# Patient Record
Sex: FEMALE | Race: WHITE | NOT HISPANIC OR LATINO | Employment: OTHER | URBAN - METROPOLITAN AREA
[De-identification: names, ages, dates, MRNs, and addresses within clinical notes are randomized per-mention and may not be internally consistent; named-entity substitution may affect disease eponyms.]

---

## 2021-04-09 DIAGNOSIS — Z01.812 BLOOD TESTS PRIOR TO TREATMENT OR PROCEDURE: Primary | ICD-10-CM

## 2021-06-25 DIAGNOSIS — Z01.812 BLOOD TESTS PRIOR TO TREATMENT OR PROCEDURE: Primary | ICD-10-CM

## 2021-08-12 ENCOUNTER — OFFICE VISIT (OUTPATIENT)
Dept: PULMONOLOGY | Facility: CLINIC | Age: 56
End: 2021-08-12

## 2021-08-12 VITALS
BODY MASS INDEX: 45.24 KG/M2 | WEIGHT: 265 LBS | OXYGEN SATURATION: 91 % | SYSTOLIC BLOOD PRESSURE: 178 MMHG | DIASTOLIC BLOOD PRESSURE: 90 MMHG | HEIGHT: 64 IN | TEMPERATURE: 98.7 F | HEART RATE: 112 BPM

## 2021-08-12 DIAGNOSIS — R06.02 SHORTNESS OF BREATH: Primary | ICD-10-CM

## 2021-08-12 DIAGNOSIS — J41.1 MUCOPURULENT CHRONIC BRONCHITIS (HCC): ICD-10-CM

## 2021-08-12 DIAGNOSIS — I10 ESSENTIAL HYPERTENSION: ICD-10-CM

## 2021-08-12 PROBLEM — E11.9 TYPE 2 DIABETES MELLITUS (HCC): Status: ACTIVE | Noted: 2021-08-12

## 2021-08-12 PROCEDURE — 94375 RESPIRATORY FLOW VOLUME LOOP: CPT | Performed by: INTERNAL MEDICINE

## 2021-08-12 PROCEDURE — 94726 PLETHYSMOGRAPHY LUNG VOLUMES: CPT | Performed by: INTERNAL MEDICINE

## 2021-08-12 PROCEDURE — 99204 OFFICE O/P NEW MOD 45 MIN: CPT | Performed by: INTERNAL MEDICINE

## 2021-08-12 PROCEDURE — 94729 DIFFUSING CAPACITY: CPT | Performed by: INTERNAL MEDICINE

## 2021-08-12 RX ORDER — ALBUTEROL SULFATE 2.5 MG/3ML
2.5 SOLUTION RESPIRATORY (INHALATION) EVERY 4 HOURS PRN
COMMUNITY
Start: 2021-06-10

## 2021-08-12 RX ORDER — LISINOPRIL 20 MG/1
20 TABLET ORAL DAILY
COMMUNITY
Start: 2021-05-24 | End: 2022-10-14 | Stop reason: HOSPADM

## 2021-08-12 RX ORDER — ALBUTEROL SULFATE 90 UG/1
2 AEROSOL, METERED RESPIRATORY (INHALATION) EVERY 4 HOURS PRN
Status: ON HOLD | COMMUNITY
End: 2022-10-11

## 2021-08-12 RX ORDER — POTASSIUM CHLORIDE 750 MG/1
10 CAPSULE, EXTENDED RELEASE ORAL DAILY
Status: ON HOLD | COMMUNITY
Start: 2021-05-11 | End: 2022-10-14 | Stop reason: SDUPTHER

## 2021-08-12 RX ORDER — FUROSEMIDE 20 MG/1
20 TABLET ORAL DAILY
Status: ON HOLD | COMMUNITY
Start: 2021-06-10 | End: 2021-11-23 | Stop reason: SDUPTHER

## 2021-08-12 NOTE — PROGRESS NOTES
New Patient Pulmonary Office Visit      Patient Name: Jovanna Ching    Referring Physician: No ref. provider found    Chief Complaint:    Chief Complaint   Patient presents with   • COPD       History of Present Illness: Jovanna Ching is a 55 y.o. female who is here today to establish care with Pulmonary. Patient has a past medical history sniffing hypertension, diabetes mellitus, depression, tobacco abuse and allergic rhinitis.  She was referred to pulmonary for evaluation of shortness of breath.  She is currently on Anoro prescribed by her PCP.  Patient states that her breathing is actually doing quite a bit better since which she was hospitalized a few months ago.  At that point time she was found to have significant mount of volume overload and started on Lasix.  The significantly improved.  She has continue to follow with cardiology.  She states that she plans to see cardiology in the next couple of weeks for ongoing testing.  She continues on the Anoro.  She has had no exacerbations of her COPD in the last year.  She denies any fever, chills, nausea, or vomiting.  She had some mild lotion edema and blood pressure has been elevated.    Review of Systems:   Review of Systems   Constitutional: Negative for activity change, appetite change, chills and diaphoresis.   HENT: Negative for congestion, postnasal drip, sinus pressure and voice change.    Eyes: Negative for blurred vision.   Respiratory: Positive for shortness of breath. Negative for cough and wheezing.    Cardiovascular: Positive for leg swelling. Negative for chest pain.   Gastrointestinal: Negative for abdominal pain.   Musculoskeletal: Negative for myalgias.   Skin: Negative for color change and dry skin.   Allergic/Immunologic: Negative for environmental allergies.   Neurological: Negative for weakness and confusion.   Hematological: Negative for adenopathy.   Psychiatric/Behavioral: Negative for sleep disturbance and depressed mood.       Past Medical  "History:   Past Medical History:   Diagnosis Date   • Broken leg    • Diabetes mellitus (CMS/HCC)    • Hypertension    • Swelling        Past Surgical History:   Past Surgical History:   Procedure Laterality Date   •  SECTION         Family History: History reviewed. No pertinent family history.    Social History:   Social History     Socioeconomic History   • Marital status:      Spouse name: Not on file   • Number of children: Not on file   • Years of education: Not on file   • Highest education level: Not on file   Tobacco Use   • Smoking status: Current Every Day Smoker     Packs/day: 1.00     Years: 32.00     Pack years: 32.00   • Smokeless tobacco: Never Used   • Tobacco comment: 1 pack per week   Vaping Use   • Vaping Use: Never used   Substance and Sexual Activity   • Alcohol use: Never   • Drug use: Never   • Sexual activity: Defer       Medications:     Current Outpatient Medications:   •  albuterol (PROVENTIL) (2.5 MG/3ML) 0.083% nebulizer solution, , Disp: , Rfl:   •  albuterol sulfate  (90 Base) MCG/ACT inhaler, Inhale 2 puffs Every 4 (Four) Hours As Needed for Wheezing., Disp: , Rfl:   •  furosemide (LASIX) 20 MG tablet, , Disp: , Rfl:   •  lisinopril (PRINIVIL,ZESTRIL) 20 MG tablet, TAKE 1 TABLET BY MOUTH 1 TIME A DAY, Disp: , Rfl:   •  metFORMIN (GLUCOPHAGE) 500 MG tablet, , Disp: , Rfl:   •  potassium chloride (MICRO-K) 10 MEQ CR capsule, , Disp: , Rfl:   •  umeclidinium-vilanterol (Anoro Ellipta) 62.5-25 MCG/INH aerosol powder  inhaler, Inhale 1 puff Daily., Disp: , Rfl:     Allergies:   No Known Allergies    Physical Exam:  Vital Signs:   Vitals:    21 1247   BP: 178/90   Pulse: 112   Temp: 98.7 °F (37.1 °C)   SpO2: 91%  Comment: resting at room air   Weight: 120 kg (265 lb)   Height: 162.6 cm (64\")       Physical Exam  Vitals and nursing note reviewed.   Constitutional:       General: She is not in acute distress.     Appearance: She is well-developed. She is obese. " She is not ill-appearing or toxic-appearing.   HENT:      Head: Normocephalic and atraumatic.   Cardiovascular:      Rate and Rhythm: Normal rate and regular rhythm.      Pulses: Normal pulses.      Heart sounds: Normal heart sounds. No murmur heard.   No friction rub. No gallop.    Pulmonary:      Effort: Pulmonary effort is normal. No respiratory distress.      Breath sounds: Normal breath sounds. No wheezing, rhonchi or rales.   Musculoskeletal:      Right lower leg: Edema present.      Left lower leg: Edema present.   Skin:     General: Skin is warm and dry.   Neurological:      Mental Status: She is alert and oriented to person, place, and time.         Immunization History   Administered Date(s) Administered   • Flu Vaccine Quad PF >36MO 11/16/2020       Results Review:   - I personally reviewed the pts imaging from chest x-ray from 8/12/2021 shows bilateral increased interstitial markings, consistent with volume overload.  - I personally reviewed the pts PFT from 8/12/2021 showed moderate obstruction with significant air trapping no restriction and a mildly reduced DLCO.  - I personally reviewed the pts chart with regards to evaluation by her PCP.    Assessment / Plan:   1. Shortness of breath (Primary)  2. Mucopurulent chronic bronchitis (CMS/HCC)  3. Essential hypertension  -The patient shortness of breath is likely a combination of the heart failure and underlying COPD.  She is not been formally told she has heart failure but given her elevated blood pressure, increased interstitial markings on chest x-ray and lower skin edema.  She very likely has either diastolic or systolic heart failure.  I have requested the records from her cardiologist in addition to [facility] to see if an echo was performed.  In addition to this I do think is reasonable to get her cardiology work-up as previously ordered to look for any signs of ischemic disease.  I explained to her as far as diastolic heart failure goes main  things would be to lower her sodium, lose weight, improve her blood pressure and take her Lasix.  I will defer to cardiology on titration of her Lasix and hypertensive medications.  With regards to her COPD I am sure this is playing a role as well I did recommend tobacco cessation, but currently she cannot afford the nicotine patches and her insurance would not pay for them, so she will continue to smoke.  I want her to continue on the Anoro 1 puff once daily I will go ahead and refill that medication and also refill the albuterol to be used on an as-needed basis.  -I will plan to see her back in a few weeks with a 6-minute walk test at the next visit looking for signs of hypoxemia, I would like to wait on this until she has been optimized from a cardiac standpoint.    Follow Up:   Return in about 3 months (around 11/12/2021).     JACKELINE Lopes, DO  Pulmonary and Critical Care Medicine  Note Electronically Signed    Please note that portions of this note may have been completed with a voice recognition program. Efforts were made to edit the dictations, but occasionally words are mistranscribed.

## 2021-11-18 ENCOUNTER — APPOINTMENT (OUTPATIENT)
Dept: CT IMAGING | Facility: HOSPITAL | Age: 56
End: 2021-11-18

## 2021-11-18 ENCOUNTER — APPOINTMENT (OUTPATIENT)
Dept: GENERAL RADIOLOGY | Facility: HOSPITAL | Age: 56
End: 2021-11-18

## 2021-11-18 ENCOUNTER — HOSPITAL ENCOUNTER (INPATIENT)
Facility: HOSPITAL | Age: 56
LOS: 5 days | Discharge: HOME | End: 2021-11-23
Attending: EMERGENCY MEDICINE | Admitting: INTERNAL MEDICINE

## 2021-11-18 DIAGNOSIS — J44.1 COPD EXACERBATION (HCC): ICD-10-CM

## 2021-11-18 DIAGNOSIS — Z74.09 IMPAIRED FUNCTIONAL MOBILITY, BALANCE, GAIT, AND ENDURANCE: ICD-10-CM

## 2021-11-18 DIAGNOSIS — J96.02 ACUTE RESPIRATORY FAILURE WITH HYPERCAPNIA (HCC): Primary | ICD-10-CM

## 2021-11-18 DIAGNOSIS — I50.9 ACUTE ON CHRONIC CONGESTIVE HEART FAILURE, UNSPECIFIED HEART FAILURE TYPE (HCC): ICD-10-CM

## 2021-11-18 PROBLEM — J81.0 ACUTE PULMONARY EDEMA: Status: ACTIVE | Noted: 2021-11-18

## 2021-11-18 PROBLEM — J96.01 ACUTE RESPIRATORY FAILURE WITH HYPOXIA AND HYPERCAPNIA (HCC): Status: ACTIVE | Noted: 2021-11-18

## 2021-11-18 PROBLEM — Z72.0 TOBACCO ABUSE: Status: ACTIVE | Noted: 2021-11-18

## 2021-11-18 PROBLEM — J96.11 CHRONIC RESPIRATORY FAILURE WITH HYPOXIA (HCC): Status: ACTIVE | Noted: 2021-11-18

## 2021-11-18 LAB
ALBUMIN SERPL-MCNC: 3.8 G/DL (ref 3.5–5.2)
ALBUMIN/GLOB SERPL: 1.1 G/DL
ALP SERPL-CCNC: 104 U/L (ref 39–117)
ALT SERPL W P-5'-P-CCNC: 35 U/L (ref 1–33)
ANION GAP SERPL CALCULATED.3IONS-SCNC: 7 MMOL/L (ref 5–15)
ARTERIAL PATENCY WRIST A: ABNORMAL
AST SERPL-CCNC: 27 U/L (ref 1–32)
ATMOSPHERIC PRESS: ABNORMAL MM[HG]
BASE EXCESS BLDA CALC-SCNC: 8.9 MMOL/L (ref 0–2)
BASOPHILS # BLD AUTO: 0.05 10*3/MM3 (ref 0–0.2)
BASOPHILS NFR BLD AUTO: 0.4 % (ref 0–1.5)
BDY SITE: ABNORMAL
BILIRUB SERPL-MCNC: 0.3 MG/DL (ref 0–1.2)
BODY TEMPERATURE: 37 C
BUN SERPL-MCNC: 18 MG/DL (ref 6–20)
BUN/CREAT SERPL: 31 (ref 7–25)
CALCIUM SPEC-SCNC: 8.9 MG/DL (ref 8.6–10.5)
CHLORIDE SERPL-SCNC: 100 MMOL/L (ref 98–107)
CO2 BLDA-SCNC: 41.4 MMOL/L (ref 22–33)
CO2 SERPL-SCNC: 31 MMOL/L (ref 22–29)
COHGB MFR BLD: 2.9 % (ref 0–2)
CREAT SERPL-MCNC: 0.58 MG/DL (ref 0.57–1)
DEPRECATED RDW RBC AUTO: 55.5 FL (ref 37–54)
EOSINOPHIL # BLD AUTO: 0.07 10*3/MM3 (ref 0–0.4)
EOSINOPHIL NFR BLD AUTO: 0.6 % (ref 0.3–6.2)
EPAP: 0
ERYTHROCYTE [DISTWIDTH] IN BLOOD BY AUTOMATED COUNT: 16.6 % (ref 12.3–15.4)
FLUAV SUBTYP SPEC NAA+PROBE: NOT DETECTED
FLUBV RNA ISLT QL NAA+PROBE: NOT DETECTED
GFR SERPL CREATININE-BSD FRML MDRD: 107 ML/MIN/1.73
GLOBULIN UR ELPH-MCNC: 3.5 GM/DL
GLUCOSE SERPL-MCNC: 160 MG/DL (ref 65–99)
HCO3 BLDA-SCNC: 38.9 MMOL/L (ref 20–26)
HCT VFR BLD AUTO: 44.3 % (ref 34–46.6)
HCT VFR BLD CALC: 41.6 % (ref 38–51)
HGB BLD-MCNC: 13 G/DL (ref 12–15.9)
HGB BLDA-MCNC: 13.6 G/DL (ref 14–18)
HOLD SPECIMEN: NORMAL
IMM GRANULOCYTES # BLD AUTO: 0.06 10*3/MM3 (ref 0–0.05)
IMM GRANULOCYTES NFR BLD AUTO: 0.5 % (ref 0–0.5)
INHALED O2 CONCENTRATION: 80 %
IPAP: 0
LYMPHOCYTES # BLD AUTO: 1.42 10*3/MM3 (ref 0.7–3.1)
LYMPHOCYTES NFR BLD AUTO: 12.1 % (ref 19.6–45.3)
Lab: ABNORMAL
MCH RBC QN AUTO: 27.4 PG (ref 26.6–33)
MCHC RBC AUTO-ENTMCNC: 29.3 G/DL (ref 31.5–35.7)
MCV RBC AUTO: 93.5 FL (ref 79–97)
METHGB BLD QL: 0.3 % (ref 0–1.5)
MODALITY: ABNORMAL
MONOCYTES # BLD AUTO: 0.72 10*3/MM3 (ref 0.1–0.9)
MONOCYTES NFR BLD AUTO: 6.2 % (ref 5–12)
NEUTROPHILS NFR BLD AUTO: 80.2 % (ref 42.7–76)
NEUTROPHILS NFR BLD AUTO: 9.37 10*3/MM3 (ref 1.7–7)
NOTE: ABNORMAL
NOTIFIED BY: ABNORMAL
NOTIFIED WHO: ABNORMAL
NRBC BLD AUTO-RTO: 0 /100 WBC (ref 0–0.2)
NT-PROBNP SERPL-MCNC: 610.6 PG/ML (ref 0–900)
OXYHGB MFR BLDV: 94.2 % (ref 94–99)
PAW @ PEAK INSP FLOW SETTING VENT: 0 CMH2O
PCO2 BLDA: 81.5 MM HG (ref 35–45)
PCO2 TEMP ADJ BLD: 81.5 MM HG (ref 35–45)
PH BLDA: 7.29 PH UNITS (ref 7.35–7.45)
PH, TEMP CORRECTED: 7.29 PH UNITS
PLATELET # BLD AUTO: 376 10*3/MM3 (ref 140–450)
PMV BLD AUTO: 9.9 FL (ref 6–12)
PO2 BLDA: 97.1 MM HG (ref 83–108)
PO2 TEMP ADJ BLD: 97.1 MM HG (ref 83–108)
POTASSIUM SERPL-SCNC: 4.8 MMOL/L (ref 3.5–5.2)
PROT SERPL-MCNC: 7.3 G/DL (ref 6–8.5)
RBC # BLD AUTO: 4.74 10*6/MM3 (ref 3.77–5.28)
SARS-COV-2 RNA PNL SPEC NAA+PROBE: NOT DETECTED
SODIUM SERPL-SCNC: 138 MMOL/L (ref 136–145)
TOTAL RATE: 0 BREATHS/MINUTE
TROPONIN T SERPL-MCNC: <0.01 NG/ML (ref 0–0.03)
WBC NRBC COR # BLD: 11.69 10*3/MM3 (ref 3.4–10.8)
WHOLE BLOOD HOLD SPECIMEN: NORMAL
WHOLE BLOOD HOLD SPECIMEN: NORMAL

## 2021-11-18 PROCEDURE — 94799 UNLISTED PULMONARY SVC/PX: CPT

## 2021-11-18 PROCEDURE — 83050 HGB METHEMOGLOBIN QUAN: CPT

## 2021-11-18 PROCEDURE — 25010000002 METHYLPREDNISOLONE PER 125 MG: Performed by: PHYSICIAN ASSISTANT

## 2021-11-18 PROCEDURE — 93005 ELECTROCARDIOGRAM TRACING: CPT

## 2021-11-18 PROCEDURE — 94640 AIRWAY INHALATION TREATMENT: CPT

## 2021-11-18 PROCEDURE — 87636 SARSCOV2 & INF A&B AMP PRB: CPT | Performed by: EMERGENCY MEDICINE

## 2021-11-18 PROCEDURE — 82375 ASSAY CARBOXYHB QUANT: CPT

## 2021-11-18 PROCEDURE — 99223 1ST HOSP IP/OBS HIGH 75: CPT | Performed by: INTERNAL MEDICINE

## 2021-11-18 PROCEDURE — 83880 ASSAY OF NATRIURETIC PEPTIDE: CPT | Performed by: EMERGENCY MEDICINE

## 2021-11-18 PROCEDURE — 25010000002 ENOXAPARIN PER 10 MG: Performed by: NURSE PRACTITIONER

## 2021-11-18 PROCEDURE — 71045 X-RAY EXAM CHEST 1 VIEW: CPT

## 2021-11-18 PROCEDURE — 36600 WITHDRAWAL OF ARTERIAL BLOOD: CPT

## 2021-11-18 PROCEDURE — 36415 COLL VENOUS BLD VENIPUNCTURE: CPT

## 2021-11-18 PROCEDURE — 71275 CT ANGIOGRAPHY CHEST: CPT

## 2021-11-18 PROCEDURE — 82805 BLOOD GASES W/O2 SATURATION: CPT

## 2021-11-18 PROCEDURE — 94660 CPAP INITIATION&MGMT: CPT

## 2021-11-18 PROCEDURE — 99284 EMERGENCY DEPT VISIT MOD MDM: CPT

## 2021-11-18 PROCEDURE — 84484 ASSAY OF TROPONIN QUANT: CPT | Performed by: EMERGENCY MEDICINE

## 2021-11-18 PROCEDURE — 93005 ELECTROCARDIOGRAM TRACING: CPT | Performed by: EMERGENCY MEDICINE

## 2021-11-18 PROCEDURE — 80053 COMPREHEN METABOLIC PANEL: CPT | Performed by: EMERGENCY MEDICINE

## 2021-11-18 PROCEDURE — 85025 COMPLETE CBC W/AUTO DIFF WBC: CPT | Performed by: EMERGENCY MEDICINE

## 2021-11-18 PROCEDURE — 25010000002 FUROSEMIDE PER 20 MG: Performed by: PHYSICIAN ASSISTANT

## 2021-11-18 PROCEDURE — 0 IOPAMIDOL PER 1 ML: Performed by: INTERNAL MEDICINE

## 2021-11-18 RX ORDER — IPRATROPIUM BROMIDE AND ALBUTEROL SULFATE 2.5; .5 MG/3ML; MG/3ML
3 SOLUTION RESPIRATORY (INHALATION) EVERY 4 HOURS PRN
Status: DISCONTINUED | OUTPATIENT
Start: 2021-11-18 | End: 2021-11-23 | Stop reason: HOSPADM

## 2021-11-18 RX ORDER — SODIUM CHLORIDE 0.9 % (FLUSH) 0.9 %
10 SYRINGE (ML) INJECTION AS NEEDED
Status: DISCONTINUED | OUTPATIENT
Start: 2021-11-18 | End: 2021-11-23 | Stop reason: HOSPADM

## 2021-11-18 RX ORDER — FUROSEMIDE 10 MG/ML
40 INJECTION INTRAMUSCULAR; INTRAVENOUS ONCE
Status: COMPLETED | OUTPATIENT
Start: 2021-11-19 | End: 2021-11-19

## 2021-11-18 RX ORDER — ALBUTEROL SULFATE 2.5 MG/3ML
2.5 SOLUTION RESPIRATORY (INHALATION) EVERY 4 HOURS PRN
Status: DISCONTINUED | OUTPATIENT
Start: 2021-11-18 | End: 2021-11-23 | Stop reason: HOSPADM

## 2021-11-18 RX ORDER — ACETAMINOPHEN 325 MG/1
650 TABLET ORAL EVERY 4 HOURS PRN
Status: DISCONTINUED | OUTPATIENT
Start: 2021-11-18 | End: 2021-11-23 | Stop reason: HOSPADM

## 2021-11-18 RX ORDER — FUROSEMIDE 10 MG/ML
40 INJECTION INTRAMUSCULAR; INTRAVENOUS ONCE
Status: COMPLETED | OUTPATIENT
Start: 2021-11-18 | End: 2021-11-18

## 2021-11-18 RX ORDER — METHYLPREDNISOLONE SODIUM SUCCINATE 125 MG/2ML
60 INJECTION, POWDER, LYOPHILIZED, FOR SOLUTION INTRAMUSCULAR; INTRAVENOUS EVERY 8 HOURS
Status: DISCONTINUED | OUTPATIENT
Start: 2021-11-19 | End: 2021-11-21

## 2021-11-18 RX ORDER — IPRATROPIUM BROMIDE AND ALBUTEROL SULFATE 2.5; .5 MG/3ML; MG/3ML
3 SOLUTION RESPIRATORY (INHALATION)
Status: DISCONTINUED | OUTPATIENT
Start: 2021-11-18 | End: 2021-11-23 | Stop reason: HOSPADM

## 2021-11-18 RX ORDER — LISINOPRIL 20 MG/1
20 TABLET ORAL
Status: DISCONTINUED | OUTPATIENT
Start: 2021-11-19 | End: 2021-11-23 | Stop reason: HOSPADM

## 2021-11-18 RX ORDER — ALBUTEROL SULFATE 90 UG/1
2 AEROSOL, METERED RESPIRATORY (INHALATION) ONCE
Status: COMPLETED | OUTPATIENT
Start: 2021-11-18 | End: 2021-11-18

## 2021-11-18 RX ORDER — SODIUM CHLORIDE 0.9 % (FLUSH) 0.9 %
10 SYRINGE (ML) INJECTION EVERY 12 HOURS SCHEDULED
Status: DISCONTINUED | OUTPATIENT
Start: 2021-11-18 | End: 2021-11-23 | Stop reason: HOSPADM

## 2021-11-18 RX ORDER — METHYLPREDNISOLONE SODIUM SUCCINATE 125 MG/2ML
125 INJECTION, POWDER, LYOPHILIZED, FOR SOLUTION INTRAMUSCULAR; INTRAVENOUS ONCE
Status: COMPLETED | OUTPATIENT
Start: 2021-11-18 | End: 2021-11-18

## 2021-11-18 RX ORDER — NICOTINE POLACRILEX 4 MG
15 LOZENGE BUCCAL
Status: DISCONTINUED | OUTPATIENT
Start: 2021-11-18 | End: 2021-11-23 | Stop reason: HOSPADM

## 2021-11-18 RX ORDER — ACETAMINOPHEN 160 MG/5ML
650 SOLUTION ORAL EVERY 4 HOURS PRN
Status: DISCONTINUED | OUTPATIENT
Start: 2021-11-18 | End: 2021-11-23 | Stop reason: HOSPADM

## 2021-11-18 RX ORDER — DEXTROSE MONOHYDRATE 25 G/50ML
25 INJECTION, SOLUTION INTRAVENOUS
Status: DISCONTINUED | OUTPATIENT
Start: 2021-11-18 | End: 2021-11-23 | Stop reason: HOSPADM

## 2021-11-18 RX ORDER — ACETAMINOPHEN 650 MG/1
650 SUPPOSITORY RECTAL EVERY 4 HOURS PRN
Status: DISCONTINUED | OUTPATIENT
Start: 2021-11-18 | End: 2021-11-23 | Stop reason: HOSPADM

## 2021-11-18 RX ADMIN — FUROSEMIDE 40 MG: 40 INJECTION, SOLUTION INTRAMUSCULAR; INTRAVENOUS at 19:47

## 2021-11-18 RX ADMIN — ACETAMINOPHEN 650 MG: 325 TABLET, FILM COATED ORAL at 23:29

## 2021-11-18 RX ADMIN — IOPAMIDOL 100 ML: 755 INJECTION, SOLUTION INTRAVENOUS at 22:41

## 2021-11-18 RX ADMIN — ALBUTEROL SULFATE 2 PUFF: 90 AEROSOL, METERED RESPIRATORY (INHALATION) at 18:33

## 2021-11-18 RX ADMIN — METHYLPREDNISOLONE SODIUM SUCCINATE 125 MG: 125 INJECTION, POWDER, FOR SOLUTION INTRAMUSCULAR; INTRAVENOUS at 18:54

## 2021-11-18 RX ADMIN — ENOXAPARIN SODIUM 40 MG: 40 INJECTION SUBCUTANEOUS at 23:29

## 2021-11-18 NOTE — ED PROVIDER NOTES
Subjective   Ms. Ching is a pleasant 56-year-old female with a history of COPD (on 2 L by nasal cannula) who presents to the emergency department with complaints of increasing shortness of breath and cough.  The patient was at her PCPs office (Rylee Love) today to discuss her recent heart catheterization results (normal).  While there, she was noted to be short of breath and hypoxic with O2 sats in the 50s.  Her portable oxygen tank was determined to be empty.  She was placed on supplemental O2 in the office but her O2 sats only improved into the upper 80s.  She was sent to our emergency department for further evaluation.  The patient denies any chest pain.  She states that she has had several weeks.  No fever.  The patient has had no known exposure to Covid 19.  She has not been vaccinated against COVID-19.  The patient continues to smoke 1/2 pack cigarettes daily.  She denies any other health problems.          Review of Systems   Constitutional: Negative for chills and fever.   HENT: Negative for sore throat.    Respiratory: Positive for cough and shortness of breath.    Cardiovascular: Negative for chest pain.   Gastrointestinal: Negative for abdominal pain, diarrhea, nausea and vomiting.   Genitourinary: Negative for decreased urine volume and dysuria.   Musculoskeletal: Negative for back pain.   Skin: Negative for pallor and rash.   Allergic/Immunologic: Negative for immunocompromised state.   Neurological: Negative for light-headedness and headaches.   Hematological: Negative.    Psychiatric/Behavioral: Negative.        Past Medical History:   Diagnosis Date   • Broken leg    • Diabetes mellitus (HCC)    • Hypertension    • Swelling        No Known Allergies    Past Surgical History:   Procedure Laterality Date   •  SECTION         History reviewed. No pertinent family history.    Social History     Socioeconomic History   • Marital status:    Tobacco Use   • Smoking status: Current Every  Day Smoker     Packs/day: 1.00     Years: 32.00     Pack years: 32.00   • Smokeless tobacco: Never Used   • Tobacco comment: 1 pack per week   Vaping Use   • Vaping Use: Never used   Substance and Sexual Activity   • Alcohol use: Never   • Drug use: Never   • Sexual activity: Defer           Objective   Physical Exam  Constitutional:       Appearance: She is well-developed. She is obese.      Comments: The patient appears dyspneic.  She is alert and mentating well.   HENT:      Head: Normocephalic.      Nose: Nose normal.      Mouth/Throat:      Mouth: Mucous membranes are moist.   Eyes:      Pupils: Pupils are equal, round, and reactive to light.   Cardiovascular:      Rate and Rhythm: Normal rate and regular rhythm.      Pulses: Normal pulses.   Pulmonary:      Effort: Respiratory distress present.      Comments: Diminished breath sounds bilaterally.  Mild expiratory wheezes.  Mild bilateral rhonchi.  Abdominal:      General: Bowel sounds are normal.      Palpations: Abdomen is soft.      Tenderness: There is no abdominal tenderness. There is no guarding.   Musculoskeletal:         General: No tenderness. Normal range of motion.      Cervical back: Normal range of motion.      Right lower leg: Edema present.      Left lower leg: Edema present.      Comments: 2+ bilateral lower extremity edema.   Neurological:      General: No focal deficit present.      Mental Status: She is alert and oriented to person, place, and time.   Psychiatric:         Mood and Affect: Mood normal.         Critical Care  Performed by: Michael Trevizo PA  Authorized by: Usman Birmingham MD     Critical care provider statement:     Critical care time (minutes):  60    Critical care time was exclusive of:  Separately billable procedures and treating other patients    Critical care was necessary to treat or prevent imminent or life-threatening deterioration of the following conditions:  Respiratory failure    Critical care was time  spent personally by me on the following activities:  Development of treatment plan with patient or surrogate, evaluation of patient's response to treatment, examination of patient, obtaining history from patient or surrogate, ordering and performing treatments and interventions, ordering and review of laboratory studies, ordering and review of radiographic studies, pulse oximetry, re-evaluation of patient's condition and review of old charts               ED Course      The pt was hypoxic at 77% on nasal cannula on arrival.  She was placed on a NRB mask and sats improved to 95%    CXR shows interval development of cardiomegaly and congestive heart  failure. Extensive bilateral pulmonary interstitial edema and small  pleural effusions.  Pt was given IV Lasix 40mg .  Her ABG show respiratory acidosis with hypercarbia with pCO2 of 81.5, pO2 of 97 and pH of 7.287.  She was placed on BiPAP.  Her COVID swab is negative.  Her BNP is 610.  Her labs are otherwise bland.  Pt was given solumedrol and albuterol via MDI.  Will admit.                                       MDM    Final diagnoses:   Acute respiratory failure with hypercapnia (HCC)   Acute on chronic congestive heart failure, unspecified heart failure type (HCC)   COPD exacerbation (HCC)       ED Disposition  ED Disposition     ED Disposition Condition Comment    Decision to Admit            No follow-up provider specified.       Medication List      No changes were made to your prescriptions during this visit.          Michael Trevizo PA  11/18/21 0380

## 2021-11-19 ENCOUNTER — APPOINTMENT (OUTPATIENT)
Dept: CARDIOLOGY | Facility: HOSPITAL | Age: 56
End: 2021-11-19

## 2021-11-19 LAB
ANION GAP SERPL CALCULATED.3IONS-SCNC: 7 MMOL/L (ref 5–15)
ARTERIAL PATENCY WRIST A: ABNORMAL
ASCENDING AORTA: 2.7 CM
ATMOSPHERIC PRESS: ABNORMAL MM[HG]
BASE EXCESS BLDA CALC-SCNC: 10.8 MMOL/L (ref 0–2)
BASE EXCESS BLDA CALC-SCNC: 12.3 MMOL/L (ref 0–2)
BASE EXCESS BLDA CALC-SCNC: 12.5 MMOL/L (ref 0–2)
BASE EXCESS BLDA CALC-SCNC: 12.9 MMOL/L (ref 0–2)
BASOPHILS # BLD AUTO: 0.02 10*3/MM3 (ref 0–0.2)
BASOPHILS NFR BLD AUTO: 0.2 % (ref 0–1.5)
BDY SITE: ABNORMAL
BH CV ECHO MEAS - AO MAX PG (FULL): 12 MMHG
BH CV ECHO MEAS - AO MAX PG: 22.3 MMHG
BH CV ECHO MEAS - AO MEAN PG (FULL): 5.1 MMHG
BH CV ECHO MEAS - AO MEAN PG: 10.3 MMHG
BH CV ECHO MEAS - AO ROOT AREA (BSA CORRECTED): 1.2
BH CV ECHO MEAS - AO ROOT AREA: 6.4 CM^2
BH CV ECHO MEAS - AO ROOT DIAM: 2.9 CM
BH CV ECHO MEAS - AO V2 MAX: 236.2 CM/SEC
BH CV ECHO MEAS - AO V2 MEAN: 146.9 CM/SEC
BH CV ECHO MEAS - AO V2 VTI: 40.9 CM
BH CV ECHO MEAS - ASC AORTA: 2.7 CM
BH CV ECHO MEAS - AVA(I,A): 2.7 CM^2
BH CV ECHO MEAS - AVA(I,D): 2.7 CM^2
BH CV ECHO MEAS - AVA(V,A): 2.4 CM^2
BH CV ECHO MEAS - AVA(V,D): 2.4 CM^2
BH CV ECHO MEAS - BSA(HAYCOCK): 2.6 M^2
BH CV ECHO MEAS - BSA: 2.4 M^2
BH CV ECHO MEAS - BZI_BMI: 44.1 KILOGRAMS/M^2
BH CV ECHO MEAS - BZI_METRIC_HEIGHT: 172.7 CM
BH CV ECHO MEAS - BZI_METRIC_WEIGHT: 131.5 KG
BH CV ECHO MEAS - EDV(CUBED): 81.9 ML
BH CV ECHO MEAS - EDV(MOD-SP2): 125 ML
BH CV ECHO MEAS - EDV(MOD-SP4): 129 ML
BH CV ECHO MEAS - EDV(TEICH): 85 ML
BH CV ECHO MEAS - EF(CUBED): 83.8 %
BH CV ECHO MEAS - EF(MOD-BP): 64 %
BH CV ECHO MEAS - EF(MOD-SP2): 60.8 %
BH CV ECHO MEAS - EF(MOD-SP4): 66.7 %
BH CV ECHO MEAS - EF(TEICH): 77.1 %
BH CV ECHO MEAS - ESV(CUBED): 13.3 ML
BH CV ECHO MEAS - ESV(MOD-SP2): 49 ML
BH CV ECHO MEAS - ESV(MOD-SP4): 43 ML
BH CV ECHO MEAS - ESV(TEICH): 19.5 ML
BH CV ECHO MEAS - FS: 45.5 %
BH CV ECHO MEAS - IVS/LVPW: 0.69
BH CV ECHO MEAS - IVSD: 0.68 CM
BH CV ECHO MEAS - LA DIMENSION: 4 CM
BH CV ECHO MEAS - LA/AO: 1.4
BH CV ECHO MEAS - LAD MAJOR: 5.7 CM
BH CV ECHO MEAS - LAT PEAK E' VEL: 9.8 CM/SEC
BH CV ECHO MEAS - LATERAL E/E' RATIO: 10.9
BH CV ECHO MEAS - LV DIASTOLIC VOL/BSA (35-75): 53.9 ML/M^2
BH CV ECHO MEAS - LV IVRT: 0.06 SEC
BH CV ECHO MEAS - LV MASS(C)D: 113 GRAMS
BH CV ECHO MEAS - LV MASS(C)DI: 47.2 GRAMS/M^2
BH CV ECHO MEAS - LV MAX PG: 10.3 MMHG
BH CV ECHO MEAS - LV MEAN PG: 5.3 MMHG
BH CV ECHO MEAS - LV SYSTOLIC VOL/BSA (12-30): 18 ML/M^2
BH CV ECHO MEAS - LV V1 MAX: 160.4 CM/SEC
BH CV ECHO MEAS - LV V1 MEAN: 107.2 CM/SEC
BH CV ECHO MEAS - LV V1 VTI: 31.7 CM
BH CV ECHO MEAS - LVIDD: 4.3 CM
BH CV ECHO MEAS - LVIDS: 2.4 CM
BH CV ECHO MEAS - LVLD AP2: 9.1 CM
BH CV ECHO MEAS - LVLD AP4: 9.3 CM
BH CV ECHO MEAS - LVLS AP2: 7.8 CM
BH CV ECHO MEAS - LVLS AP4: 7.7 CM
BH CV ECHO MEAS - LVOT AREA (M): 3.5 CM^2
BH CV ECHO MEAS - LVOT AREA: 3.5 CM^2
BH CV ECHO MEAS - LVOT DIAM: 2.1 CM
BH CV ECHO MEAS - LVPWD: 0.99 CM
BH CV ECHO MEAS - MED PEAK E' VEL: 8.7 CM/SEC
BH CV ECHO MEAS - MEDIAL E/E' RATIO: 12.3
BH CV ECHO MEAS - MV A MAX VEL: 102.7 CM/SEC
BH CV ECHO MEAS - MV DEC SLOPE: 501.6 CM/SEC^2
BH CV ECHO MEAS - MV DEC TIME: 0.18 SEC
BH CV ECHO MEAS - MV E MAX VEL: 108.6 CM/SEC
BH CV ECHO MEAS - MV E/A: 1.1
BH CV ECHO MEAS - MV MAX PG: 8.3 MMHG
BH CV ECHO MEAS - MV MEAN PG: 3.6 MMHG
BH CV ECHO MEAS - MV P1/2T MAX VEL: 149.3 CM/SEC
BH CV ECHO MEAS - MV P1/2T: 87.2 MSEC
BH CV ECHO MEAS - MV V2 MAX: 143.9 CM/SEC
BH CV ECHO MEAS - MV V2 MEAN: 88.2 CM/SEC
BH CV ECHO MEAS - MV V2 VTI: 38.8 CM
BH CV ECHO MEAS - MVA P1/2T LCG: 1.5 CM^2
BH CV ECHO MEAS - MVA(P1/2T): 2.5 CM^2
BH CV ECHO MEAS - MVA(VTI): 2.9 CM^2
BH CV ECHO MEAS - PA ACC SLOPE: 880 CM/SEC^2
BH CV ECHO MEAS - PA ACC TIME: 0.13 SEC
BH CV ECHO MEAS - PA PR(ACCEL): 22 MMHG
BH CV ECHO MEAS - RAP SYSTOLE: 3 MMHG
BH CV ECHO MEAS - RVSP: 42 MMHG
BH CV ECHO MEAS - SI(AO): 110.1 ML/M^2
BH CV ECHO MEAS - SI(CUBED): 28.7 ML/M^2
BH CV ECHO MEAS - SI(LVOT): 46.9 ML/M^2
BH CV ECHO MEAS - SI(MOD-SP2): 31.8 ML/M^2
BH CV ECHO MEAS - SI(MOD-SP4): 35.9 ML/M^2
BH CV ECHO MEAS - SI(TEICH): 27.4 ML/M^2
BH CV ECHO MEAS - SV(AO): 263.4 ML
BH CV ECHO MEAS - SV(CUBED): 68.6 ML
BH CV ECHO MEAS - SV(LVOT): 112.3 ML
BH CV ECHO MEAS - SV(MOD-SP2): 76 ML
BH CV ECHO MEAS - SV(MOD-SP4): 86 ML
BH CV ECHO MEAS - SV(TEICH): 65.5 ML
BH CV ECHO MEAS - TAPSE (>1.6): 2.9 CM
BH CV ECHO MEAS - TR MAX PG: 39 MMHG
BH CV ECHO MEAS - TR MAX VEL: 314 CM/SEC
BH CV ECHO MEASUREMENTS AVERAGE E/E' RATIO: 11.74
BH CV VAS BP LEFT ARM: NORMAL MMHG
BH CV XLRA - RV BASE: 4.3 CM
BH CV XLRA - RV LENGTH: 8.9 CM
BH CV XLRA - RV MID: 2.6 CM
BH CV XLRA - TDI S': 15.6 CM/SEC
BODY TEMPERATURE: 37 C
BUN SERPL-MCNC: 16 MG/DL (ref 6–20)
BUN/CREAT SERPL: 31.4 (ref 7–25)
CALCIUM SPEC-SCNC: 9 MG/DL (ref 8.6–10.5)
CHLORIDE SERPL-SCNC: 96 MMOL/L (ref 98–107)
CO2 BLDA-SCNC: 42.6 MMOL/L (ref 22–33)
CO2 BLDA-SCNC: 42.6 MMOL/L (ref 22–33)
CO2 BLDA-SCNC: 44.8 MMOL/L (ref 22–33)
CO2 BLDA-SCNC: 44.8 MMOL/L (ref 22–33)
CO2 SERPL-SCNC: 35 MMOL/L (ref 22–29)
COHGB MFR BLD: 1.3 % (ref 0–2)
COHGB MFR BLD: 1.3 % (ref 0–2)
COHGB MFR BLD: 1.4 % (ref 0–2)
COHGB MFR BLD: 1.6 % (ref 0–2)
CREAT SERPL-MCNC: 0.51 MG/DL (ref 0.57–1)
DEPRECATED RDW RBC AUTO: 54.9 FL (ref 37–54)
EOSINOPHIL # BLD AUTO: 0 10*3/MM3 (ref 0–0.4)
EOSINOPHIL NFR BLD AUTO: 0 % (ref 0.3–6.2)
EPAP: 0
ERYTHROCYTE [DISTWIDTH] IN BLOOD BY AUTOMATED COUNT: 16.1 % (ref 12.3–15.4)
GFR SERPL CREATININE-BSD FRML MDRD: 124 ML/MIN/1.73
GLUCOSE BLDC GLUCOMTR-MCNC: 148 MG/DL (ref 70–130)
GLUCOSE BLDC GLUCOMTR-MCNC: 201 MG/DL (ref 70–130)
GLUCOSE BLDC GLUCOMTR-MCNC: 206 MG/DL (ref 70–130)
GLUCOSE BLDC GLUCOMTR-MCNC: 294 MG/DL (ref 70–130)
GLUCOSE SERPL-MCNC: 168 MG/DL (ref 65–99)
HBA1C MFR BLD: 7.3 % (ref 4.8–5.6)
HCO3 BLDA-SCNC: 40.2 MMOL/L (ref 20–26)
HCO3 BLDA-SCNC: 40.6 MMOL/L (ref 20–26)
HCO3 BLDA-SCNC: 42.2 MMOL/L (ref 20–26)
HCO3 BLDA-SCNC: 42.3 MMOL/L (ref 20–26)
HCT VFR BLD AUTO: 44.8 % (ref 34–46.6)
HCT VFR BLD CALC: 40.3 % (ref 38–51)
HCT VFR BLD CALC: 40.9 % (ref 38–51)
HCT VFR BLD CALC: 41.4 % (ref 38–51)
HCT VFR BLD CALC: 41.6 % (ref 38–51)
HGB BLD-MCNC: 13 G/DL (ref 12–15.9)
HGB BLDA-MCNC: 13.1 G/DL (ref 14–18)
HGB BLDA-MCNC: 13.3 G/DL (ref 14–18)
HGB BLDA-MCNC: 13.5 G/DL (ref 14–18)
HGB BLDA-MCNC: 13.6 G/DL (ref 14–18)
IMM GRANULOCYTES # BLD AUTO: 0.06 10*3/MM3 (ref 0–0.05)
IMM GRANULOCYTES NFR BLD AUTO: 0.6 % (ref 0–0.5)
INHALED O2 CONCENTRATION: 40 %
INHALED O2 CONCENTRATION: 40 %
INHALED O2 CONCENTRATION: 75 %
INHALED O2 CONCENTRATION: 85 %
IPAP: 0
IVRT: 63 MSEC
LEFT ATRIUM VOLUME INDEX: 22.6 ML/M^2
LEFT ATRIUM VOLUME: 54 ML
LYMPHOCYTES # BLD AUTO: 0.57 10*3/MM3 (ref 0.7–3.1)
LYMPHOCYTES NFR BLD AUTO: 6.1 % (ref 19.6–45.3)
Lab: ABNORMAL
Lab: ABNORMAL
MCH RBC QN AUTO: 27.4 PG (ref 26.6–33)
MCHC RBC AUTO-ENTMCNC: 29 G/DL (ref 31.5–35.7)
MCV RBC AUTO: 94.5 FL (ref 79–97)
METHGB BLD QL: 0.3 % (ref 0–1.5)
METHGB BLD QL: 0.3 % (ref 0–1.5)
METHGB BLD QL: 0.4 % (ref 0–1.5)
METHGB BLD QL: 0.4 % (ref 0–1.5)
MODALITY: ABNORMAL
MONOCYTES # BLD AUTO: 0.24 10*3/MM3 (ref 0.1–0.9)
MONOCYTES NFR BLD AUTO: 2.6 % (ref 5–12)
NEUTROPHILS NFR BLD AUTO: 8.4 10*3/MM3 (ref 1.7–7)
NEUTROPHILS NFR BLD AUTO: 90.5 % (ref 42.7–76)
NOTE: ABNORMAL
NOTIFIED BY: ABNORMAL
NOTIFIED BY: ABNORMAL
NOTIFIED WHO: ABNORMAL
NOTIFIED WHO: ABNORMAL
NRBC BLD AUTO-RTO: 0 /100 WBC (ref 0–0.2)
OXYHGB MFR BLDV: 90 % (ref 94–99)
OXYHGB MFR BLDV: 91.4 % (ref 94–99)
OXYHGB MFR BLDV: 98.1 % (ref 94–99)
OXYHGB MFR BLDV: 98.2 % (ref 94–99)
PAW @ PEAK INSP FLOW SETTING VENT: 0 CMH2O
PCO2 BLDA: 65 MM HG (ref 35–45)
PCO2 BLDA: 78 MM HG (ref 35–45)
PCO2 BLDA: 82.1 MM HG (ref 35–45)
PCO2 BLDA: 82.6 MM HG (ref 35–45)
PCO2 TEMP ADJ BLD: 65 MM HG (ref 35–45)
PCO2 TEMP ADJ BLD: 78 MM HG (ref 35–45)
PCO2 TEMP ADJ BLD: 82.6 MM HG (ref 35–45)
PCO2 TEMP ADJ BLD: ABNORMAL MM[HG]
PEEP RESPIRATORY: 5 CM[H2O]
PH BLDA: 7.32 PH UNITS (ref 7.35–7.45)
PH BLDA: 7.4 PH UNITS (ref 7.35–7.45)
PH, TEMP CORRECTED: 7.32 PH UNITS
PH, TEMP CORRECTED: 7.32 PH UNITS
PH, TEMP CORRECTED: 7.4 PH UNITS
PH, TEMP CORRECTED: ABNORMAL
PLATELET # BLD AUTO: 355 10*3/MM3 (ref 140–450)
PMV BLD AUTO: 10.3 FL (ref 6–12)
PO2 BLDA: 180 MM HG (ref 83–108)
PO2 BLDA: 245 MM HG (ref 83–108)
PO2 BLDA: 64.5 MM HG (ref 83–108)
PO2 BLDA: 65 MM HG (ref 83–108)
PO2 TEMP ADJ BLD: 180 MM HG (ref 83–108)
PO2 TEMP ADJ BLD: 64.5 MM HG (ref 83–108)
PO2 TEMP ADJ BLD: 65 MM HG (ref 83–108)
PO2 TEMP ADJ BLD: ABNORMAL MM[HG]
POTASSIUM SERPL-SCNC: 5 MMOL/L (ref 3.5–5.2)
QT INTERVAL: 354 MS
QTC INTERVAL: 425 MS
RBC # BLD AUTO: 4.74 10*6/MM3 (ref 3.77–5.28)
SODIUM SERPL-SCNC: 138 MMOL/L (ref 136–145)
TOTAL RATE: 0 BREATHS/MINUTE
VENTILATOR MODE: ABNORMAL
VENTILATOR MODE: ABNORMAL
WBC NRBC COR # BLD: 9.29 10*3/MM3 (ref 3.4–10.8)

## 2021-11-19 PROCEDURE — 82375 ASSAY CARBOXYHB QUANT: CPT | Performed by: INTERNAL MEDICINE

## 2021-11-19 PROCEDURE — 82805 BLOOD GASES W/O2 SATURATION: CPT

## 2021-11-19 PROCEDURE — 25010000002 FUROSEMIDE PER 20 MG: Performed by: NURSE PRACTITIONER

## 2021-11-19 PROCEDURE — 82375 ASSAY CARBOXYHB QUANT: CPT

## 2021-11-19 PROCEDURE — 83050 HGB METHEMOGLOBIN QUAN: CPT

## 2021-11-19 PROCEDURE — 85025 COMPLETE CBC W/AUTO DIFF WBC: CPT | Performed by: NURSE PRACTITIONER

## 2021-11-19 PROCEDURE — 80048 BASIC METABOLIC PNL TOTAL CA: CPT | Performed by: NURSE PRACTITIONER

## 2021-11-19 PROCEDURE — 99233 SBSQ HOSP IP/OBS HIGH 50: CPT | Performed by: INTERNAL MEDICINE

## 2021-11-19 PROCEDURE — 63710000001 INSULIN LISPRO (HUMAN) PER 5 UNITS: Performed by: NURSE PRACTITIONER

## 2021-11-19 PROCEDURE — 82805 BLOOD GASES W/O2 SATURATION: CPT | Performed by: INTERNAL MEDICINE

## 2021-11-19 PROCEDURE — 94640 AIRWAY INHALATION TREATMENT: CPT

## 2021-11-19 PROCEDURE — 94799 UNLISTED PULMONARY SVC/PX: CPT

## 2021-11-19 PROCEDURE — 36600 WITHDRAWAL OF ARTERIAL BLOOD: CPT | Performed by: INTERNAL MEDICINE

## 2021-11-19 PROCEDURE — 83036 HEMOGLOBIN GLYCOSYLATED A1C: CPT | Performed by: NURSE PRACTITIONER

## 2021-11-19 PROCEDURE — 93306 TTE W/DOPPLER COMPLETE: CPT | Performed by: INTERNAL MEDICINE

## 2021-11-19 PROCEDURE — 36600 WITHDRAWAL OF ARTERIAL BLOOD: CPT

## 2021-11-19 PROCEDURE — 82962 GLUCOSE BLOOD TEST: CPT

## 2021-11-19 PROCEDURE — 94660 CPAP INITIATION&MGMT: CPT

## 2021-11-19 PROCEDURE — 83050 HGB METHEMOGLOBIN QUAN: CPT | Performed by: INTERNAL MEDICINE

## 2021-11-19 PROCEDURE — 25010000002 ENOXAPARIN PER 10 MG: Performed by: NURSE PRACTITIONER

## 2021-11-19 PROCEDURE — 93306 TTE W/DOPPLER COMPLETE: CPT

## 2021-11-19 PROCEDURE — 25010000002 METHYLPREDNISOLONE PER 125 MG: Performed by: NURSE PRACTITIONER

## 2021-11-19 RX ORDER — NICOTINE 21 MG/24HR
1 PATCH, TRANSDERMAL 24 HOURS TRANSDERMAL
Status: DISCONTINUED | OUTPATIENT
Start: 2021-11-19 | End: 2021-11-23 | Stop reason: HOSPADM

## 2021-11-19 RX ADMIN — SODIUM CHLORIDE, PRESERVATIVE FREE 10 ML: 5 INJECTION INTRAVENOUS at 00:59

## 2021-11-19 RX ADMIN — NICOTINE 1 PATCH: 14 PATCH, EXTENDED RELEASE TRANSDERMAL at 02:58

## 2021-11-19 RX ADMIN — METHYLPREDNISOLONE SODIUM SUCCINATE 60 MG: 125 INJECTION, POWDER, FOR SOLUTION INTRAMUSCULAR; INTRAVENOUS at 04:24

## 2021-11-19 RX ADMIN — ACETAMINOPHEN 650 MG: 325 TABLET, FILM COATED ORAL at 18:00

## 2021-11-19 RX ADMIN — METHYLPREDNISOLONE SODIUM SUCCINATE 60 MG: 125 INJECTION, POWDER, FOR SOLUTION INTRAMUSCULAR; INTRAVENOUS at 12:24

## 2021-11-19 RX ADMIN — SODIUM CHLORIDE, PRESERVATIVE FREE 10 ML: 5 INJECTION INTRAVENOUS at 08:50

## 2021-11-19 RX ADMIN — LISINOPRIL 20 MG: 20 TABLET ORAL at 08:49

## 2021-11-19 RX ADMIN — IPRATROPIUM BROMIDE AND ALBUTEROL SULFATE 3 ML: 2.5; .5 SOLUTION RESPIRATORY (INHALATION) at 07:15

## 2021-11-19 RX ADMIN — FUROSEMIDE 40 MG: 40 INJECTION, SOLUTION INTRAMUSCULAR; INTRAVENOUS at 08:49

## 2021-11-19 RX ADMIN — METHYLPREDNISOLONE SODIUM SUCCINATE 60 MG: 125 INJECTION, POWDER, FOR SOLUTION INTRAMUSCULAR; INTRAVENOUS at 21:29

## 2021-11-19 RX ADMIN — INSULIN LISPRO 3 UNITS: 100 INJECTION, SOLUTION INTRAVENOUS; SUBCUTANEOUS at 12:23

## 2021-11-19 RX ADMIN — IPRATROPIUM BROMIDE AND ALBUTEROL SULFATE 3 ML: 2.5; .5 SOLUTION RESPIRATORY (INHALATION) at 19:57

## 2021-11-19 RX ADMIN — INSULIN LISPRO 4 UNITS: 100 INJECTION, SOLUTION INTRAVENOUS; SUBCUTANEOUS at 17:59

## 2021-11-19 RX ADMIN — SODIUM CHLORIDE, PRESERVATIVE FREE 10 ML: 5 INJECTION INTRAVENOUS at 21:29

## 2021-11-19 RX ADMIN — NICOTINE 1 PATCH: 14 PATCH, EXTENDED RELEASE TRANSDERMAL at 02:59

## 2021-11-19 RX ADMIN — ENOXAPARIN SODIUM 40 MG: 40 INJECTION SUBCUTANEOUS at 21:29

## 2021-11-19 RX ADMIN — ENOXAPARIN SODIUM 40 MG: 40 INJECTION SUBCUTANEOUS at 08:49

## 2021-11-19 RX ADMIN — IPRATROPIUM BROMIDE AND ALBUTEROL SULFATE 3 ML: 2.5; .5 SOLUTION RESPIRATORY (INHALATION) at 16:29

## 2021-11-19 NOTE — CASE MANAGEMENT/SOCIAL WORK
Discharge Planning Assessment  [facility]     Patient Name: Jovanna Ching  MRN: [id number]  Today's Date: 11/19/2021    Admit Date: 11/18/2021     Discharge Needs Assessment     Row Name 11/19/21 1138       Living Environment    Lives With parent(s)    Current Living Arrangements home/apartment/condo    Primary Care Provided by self    Provides Primary Care For no one    Able to Return to Prior Arrangements yes       Transition Planning    Patient/Family Anticipates Transition to home    Patient/Family Anticipated Services at Transition none       Discharge Needs Assessment    Readmission Within the Last 30 Days no previous admission in last 30 days    Equipment Currently Used at Home cane, straight; nebulizer; oxygen; rollator    Concerns to be Addressed denies needs/concerns at this time               Discharge Plan     Row Name 11/19/21 1140       Plan    Plan home    Patient/Family in Agreement with Plan yes    Plan Comments I met with Mrs. Ching at the bedside. She lives in [facility] with her mother. She is independent with activities of daily living and uses a rollator to help with mobility at baseline. She is on 2.5 liter of oxygen at home that is provided through [employer]. She drives herself when leaving the home. She is not current with any out patient therapies. She anticipates returning home with her mother at the time of discharge. Case management will contiue to follow her plan of care and assess for discharge needs.    Final Discharge Disposition Code 30 - still a patient              Continued Care and Services - Admitted Since 11/18/2021    Coordination has not been started for this encounter.          Demographic Summary     Row Name 11/19/21 1137       General Information    General Information Comments I confirmed that Lulu Ivan is Mrs. Ching PCP. I confirmed that [facility] [employer] is her primary insurer with [place] Medicaid as secondary.               Functional Status      Row Name 11/19/21 1137       Functional Status, IADL    Medications independent    Meal Preparation independent    Housekeeping independent    Laundry independent    Shopping independent               Psychosocial    No documentation.                Abuse/Neglect    No documentation.                Legal    No documentation.                Substance Abuse    No documentation.                Patient Forms    No documentation.                   Jace Real RN

## 2021-11-19 NOTE — PLAN OF CARE
Goal Outcome Evaluation: Pt on bipap sating in 90's. ABGs still critical APRN aware. Pt receiving iv steroids. Vs stable at this time. Possible cardiology consult. Echo this am, CTA results pending. Will continue to monitor.

## 2021-11-19 NOTE — H&P
[facility] Medicine Services  HISTORY AND PHYSICAL    Patient Name: Jovanna Ching  : 1965  MRN: [id number]  Primary Care Physician: Provider, No Known  Date of admission: 2021    Subjective   Subjective     Chief Complaint:  Shortness of air     HPI:  Jovanna Ching is a 56 y.o. female with a past medical history significant for oxygen dependent COPD (wears 2 to 2-1/2 L continuously), diabetes mellitus type 2, essential hypertension and tobacco abuse presents to the ED via EMS due to worsening shortness of breath.  Patient reports she went to see her PCP today to follow-up for recent heart cath that was performed at [facility] (that was normal).  While she was there she was noted to be hypoxic with her oxygen saturation in the 50s along with oral cyanosis and notable shortness of breath.  It was noted that her oxygen tank was empty.  She was placed on supplemental oxygen and her O2 sat increased to the 80s.  She was brought to the ED for further evaluation and treatment.  Patient reports feeling well up until today she had noticed increased shortness of breath even at rest.  She denies any fever, chills, nausea, vomiting, diarrhea, abdominal pain, palpitations, dizziness or syncope.  She does however report having body aches today along with worsening lower extremity edema and decreased urine output despite taking her daily 20 mg of Lasix.  Upon arrival to the ED her oxygen saturation was noted to be 77%.  She was placed on 100% nonrebreather mask with O2 saturation improvement to 96.  ABG was obtained that showed PCO2 of 81.5, pH 7.28, HCO3 38.9, PO2 97.1.  Chest x-ray showed interval development of cardiomegaly and CHF.  Extensive bilateral pulmonary edema with small pleural effusions.  She was given 40 mg of IV Lasix along with Solu-Medrol.  She reports currently improvement of her symptoms but does complain of pain between her shoulder blades that has been ongoing  throughout today.  Patient will be admitted to [facility] under the care of the hospitalist for further evaluation and treatment.    Of note patient reports she has had a thoracentesis with removal of a liter of fluid approximately 5 years ago.    COVID Details:    Symptoms:    [] NONE [] Fever [x]  Cough [x] Shortness of breath [] Change in taste/smell      The patient qualifies to receive the vaccine, but they have not yet received it.      Review of Systems   Constitutional: Negative for activity change, appetite change, chills, diaphoresis, fatigue, fever and unexpected weight change.   HENT: Negative.    Eyes: Negative for photophobia and visual disturbance.   Respiratory: Positive for cough and shortness of breath.    Cardiovascular: Positive for leg swelling. Negative for chest pain and palpitations.   Gastrointestinal: Positive for abdominal distention. Negative for abdominal pain, blood in stool, diarrhea, nausea and vomiting.   Musculoskeletal: Positive for back pain. Negative for neck pain and neck stiffness.   Skin: Negative.    Neurological: Negative.    Psychiatric/Behavioral: Negative.         All other systems reviewed and are negative.     Personal History     Past Medical History:   Diagnosis Date   • Asthma    • Broken leg    • CHF (congestive heart failure) (HCC)    • COPD (chronic obstructive pulmonary disease) (HCC)    • Diabetes mellitus (HCC)    • GERD (gastroesophageal reflux disease)    • Hypertension    • Swelling        Past Surgical History:   Procedure Laterality Date   • BACK SURGERY     •  SECTION     • VASCULAR SURGERY         Family History: Family history is unknown by patient. Otherwise pertinent FHx was reviewed and unremarkable.     Social History:  reports that she has been smoking. She has a 32.00 pack-year smoking history. She has never used smokeless tobacco. She reports that she does not drink alcohol and does not use drugs.  Social History     Social  History Narrative   • Not on file       Medications:  albuterol, albuterol sulfate HFA, furosemide, lisinopril, metFORMIN, potassium chloride, and umeclidinium-vilanterol    No Known Allergies    Objective   Objective     Vital Signs:   Temp:  [98.9 °F (37.2 °C)] 98.9 °F (37.2 °C)  Heart Rate:  [83-88] 83  Resp:  [18-19] 18  BP: (116-170)/(66-99) 116/66  Flow (L/min):  [4-15] 15    Physical Exam  Vitals and nursing note reviewed.   Constitutional:       General: She is not in acute distress.     Appearance: Normal appearance. She is obese. She is not ill-appearing, toxic-appearing or diaphoretic.   HENT:      Head: Normocephalic and atraumatic.      Nose: Nose normal.      Mouth/Throat:      Mouth: Mucous membranes are dry.      Pharynx: Oropharynx is clear.   Eyes:      Extraocular Movements: Extraocular movements intact.      Conjunctiva/sclera: Conjunctivae normal.      Pupils: Pupils are equal, round, and reactive to light.   Cardiovascular:      Rate and Rhythm: Normal rate and regular rhythm.      Pulses: Normal pulses.      Heart sounds: Normal heart sounds.   Pulmonary:      Effort: Pulmonary effort is normal.      Breath sounds: Wheezing and rales present.      Comments: Crackles to bilateral lower lobes.  Inspiratory wheezes to lower lobes.   Abdominal:      General: Bowel sounds are normal. There is no distension.      Palpations: Abdomen is soft. There is no mass.      Tenderness: There is no abdominal tenderness. There is no right CVA tenderness, guarding or rebound.      Hernia: No hernia is present.   Musculoskeletal:         General: No swelling, tenderness, deformity or signs of injury. Normal range of motion.      Cervical back: Normal range of motion and neck supple.      Right lower leg: Edema present.      Left lower leg: Edema present.      Comments: 2+ pitting edema to bilateral lower extremities extending to bilateral knees    Skin:     General: Skin is warm and dry.   Neurological:       General: No focal deficit present.      Mental Status: She is alert and oriented to person, place, and time. Mental status is at baseline.   Psychiatric:         Mood and Affect: Mood normal.         Behavior: Behavior normal.         Thought Content: Thought content normal.         Judgment: Judgment normal.            Results Reviewed:  I have personally reviewed most recent indicated data and agree with findings including:  [x]  Laboratory  [x]  Radiology  [x]  EKG/Telemetry  []  Pathology  []  Cardiac/Vascular Studies  []  Old records  []  Other:  Most pertinent findings include:      LAB RESULTS:      Lab 11/18/21  1723   WBC 11.69*   HEMOGLOBIN 13.0   HEMATOCRIT 44.3   PLATELETS 376   NEUTROS ABS 9.37*   IMMATURE GRANS (ABS) 0.06*   LYMPHS ABS 1.42   MONOS ABS 0.72   EOS ABS 0.07   MCV 93.5         Lab 11/18/21  1723   SODIUM 138   POTASSIUM 4.8   CHLORIDE 100   CO2 31.0*   ANION GAP 7.0   BUN 18   CREATININE 0.58   GLUCOSE 160*   CALCIUM 8.9         Lab 11/18/21  1723   TOTAL PROTEIN 7.3   ALBUMIN 3.80   GLOBULIN 3.5   ALT (SGPT) 35*   AST (SGOT) 27   BILIRUBIN 0.3   ALK PHOS 104         Lab 11/18/21  1723   PROBNP 610.6   TROPONIN T <0.010                 Lab 11/18/21  1848   PH, ARTERIAL 7.287*   PCO2, ARTERIAL 81.5*   PO2 ART 97.1   FIO2 80   HCO3 ART 38.9*   BASE EXCESS ART 8.9*   CARBOXYHEMOGLOBIN 2.9*     Brief Urine Lab Results     None        Microbiology Results (last 10 days)     Procedure Component Value - Date/Time    COVID PRE-OP / PRE-PROCEDURE SCREENING ORDER (NO ISOLATION) - Swab, Nasopharynx [[id number]]  (Normal) Collected: 11/18/21 1724    Lab Status: Final result Specimen: Swab from Nasopharynx Updated: 11/18/21 1809    Narrative:      The following orders were created for panel order COVID PRE-OP / PRE-PROCEDURE SCREENING ORDER (NO ISOLATION) - Swab, Nasopharynx.  Procedure                               Abnormality         Status                     ---------                                -----------         ------                     COVID-19 and FLU A/B PCR...[[id number]]  Normal              Final result                 Please view results for these tests on the individual orders.    COVID-19 and FLU A/B PCR - Swab, Nasopharynx [[id number]]  (Normal) Collected: 11/18/21 1724    Lab Status: Final result Specimen: Swab from Nasopharynx Updated: 11/18/21 1809     COVID19 Not Detected     Influenza A PCR Not Detected     Influenza B PCR Not Detected    Narrative:      Fact sheet for providers: https://www.fda.gov/media/996808/download    Fact sheet for patients: https://www.fda.gov/media/868312/download    Test performed by PCR.          XR Chest 1 View    Result Date: 11/18/2021  EXAMINATION: XR CHEST 1 VW - 11/18/2021  INDICATION: Shortness of air.  COMPARISON: 08/12/2021  FINDINGS: There has been interval enlargement of the heart, pulmonary vasculature and interval development of extensive pulmonary interstitial edema. There are small pleural effusions. No densely consolidated lung or pneumothorax is seen.      Impression: Interval development of cardiomegaly and congestive heart failure. Extensive bilateral pulmonary interstitial edema and small pleural effusions.  DICTATED:   11/18/2021 EDITED/lfs:   11/18/2021              Assessment/Plan   Assessment & Plan       Acute respiratory failure with hypoxia and hypercapnia (HCC)    Essential hypertension    Type 2 diabetes mellitus (HCC)    Acute pulmonary edema (HCC)    COPD with acute exacerbation (HCC)      56-year-old female presents the ED with worsening shortness of breath that started today while at PCP office.    1) acute respiratory failure with hypoxia and hypercapnia  -ABG as noted above  -O2 sat on arrival 77%  -Continue BiPAP  -Repeat ABG in an hour  -Likely secondary to new CHF along with COPD exacerbation  -Continue steroids  -Status post 40 Lasix in the ED  -Strict I's and O's  -Daily weights  -Obtain echo in a.m.  -CTA of chest showed  bilateral hilar/mediastinal LAD ?reactive vs sarcoid vs CA vs Lymphma.  Needs repeat CT chest in 3mo  -Continuous pulse ox  -Keep O2 sat greater than 90%  -Consider cardiology consult  -Patient did have recent left heart cath at [facility], will obtain records  -Scheduled and as needed DuoNebs    2) COPD with acute exacerbation  -Continue IV steroids  -Scheduled and as needed DuoNeb's  -Chest x-ray as noted above  -CTA of chest pending  -Continuous pulse ox  -Keep O2 sat greater than 90%    3) acute pulmonary edema  -Echo in a.m.  -Chest x-ray noted above  -CTA of chest pending  -Consider cardiology consult  -Strict I's and O's  -Cardiomegaly noted on chest x-ray  -Reports compliance with Lasix 20 mg daily  -Received 40 mg of Lasix in the ED will repeat dose in am    4) diabetes mellitus type 2  -Check hemoglobin A1c  -Start low-dose sliding scale insulin  -May need to adjust due to steroids  -Fingersticks before meals and at bedtime    5) essential hypertension  -Continue lisinopril  -BP stable    6) tobacco abuse  -Cessation education provided  -Nicotine patch    Hilar/Mediastinal LAD  --?reactive vs sarcoid vs CA vs Lymphoma  --needs repeat CT chest in 3mos    Right Thyroid Enlargement  --check an u/s of thyroid          DVT prophylaxis:  Scds, lovenox    CODE STATUS:  Full code   This note has been completed as part of a split-shared workflow.     Signature: Electronically signed by GUILLERMO Nath, 11/18/21, 10:23 PM EST.      Attending   Admission Attestation       I have seen and examined the patient, performing an independent face-to-face diagnostic evaluation with plan of care reviewed and developed with the advanced practice clinician (APC).      Brief Summary Statement:   Jovanna Ching is a 56 y.o. female with h/o COPD home O2 dependent on 2.0 - 2.5LNC and continued tobacco abuse with increased soa and mild cough for a few days.  She had a recent LHC with [facility] cardiologist that reportedly was  normal and went to f/u with her PCP today when O2 sats where in the 50's and O2 tank was discovered to be empty.  Sats came up to 80's on 2LNC so was sent to the ER where she was find to have a respiratory acidosis with PH 7.287 and CO2 81.    Remainder of detailed HPI is as noted by APC and has been reviewed and/or edited by me for completeness.    Attending Physical Exam:  Constitutional: Awake, alert  Eyes: PERRLA, sclerae anicteric, no conjunctival injection  HENT: NCAT, mucous membranes moist  Neck: Supple, no thyromegaly, no lymphadenopathy, trachea midline  Respiratory: Bilateral wheezing, nonlabored respirations on bipap  Cardiovascular: RRR, no murmurs, rubs, or gallops, palpable pedal pulses bilaterally  Gastrointestinal: Positive bowel sounds, soft, nontender, nondistended  Musculoskeletal: 1-2+pittind edema bilateral LE's  Psychiatric: Appropriate affect, cooperative  Neurologic: Oriented x 3, strength symmetric in all extremities, Cranial Nerves grossly intact to confrontation, speech clear  Skin: No rashes      Brief Assessment/Plan :  See detailed assessment and plan developed with APC which I have reviewed and/or edited for completeness.        Admission Status: I believe that this patient meets INPATIENT status due to respiratory failure, hypoxia, hypercapnia  I feel patient’s risk for adverse outcomes and need for care warrant INPATIENT evaluation and I predict the patient’s care encounter to likely last beyond 2 midnights.        Frantz Marie MD  11/19/21

## 2021-11-19 NOTE — CONSULTS
"  Referring Provider: MD Cynthia  Reason for Consultation: AECOPD    Subjective .   Education:  NN spoke with pt at BS.  Pt alert and able to answer questions appropriately.  Pt O2 sat 92% on  5 L currently, home O2 use  2.5L.  Pt reports the ability to ambulate Without issue at baseline before experiencing SOB.  Pt states use of rescue inhaler 14 times weekly, relief of SOB within ~2 mins.  Patient is up to date on flu and PNA vaccines.   Patient is a current tobacco user.  Tobacco cessation encouraged.  Patient states that their current level of motivation is 10  /10.  She reports that she smokes as soon as she wakes up.  She states that her insurance does not cover patches and she has not been able to receive any help from the Quit Line as recently as 2 months ago.    Discussion of smoking cessation consisted of assessment interview, provision of community resources, counseling on tobacco dependence, nonpharmacologic cessation techniques, medications and relapse prevention.   Smoking cessation education completed. Cessation support resources discussed with pt. This discussion lasted between  3 and 5  minutes.  Pt reports no issues at this time with medications or transportation for appointments.  Pt reports no previous hx of formal COPD teaching, no understanding of action plan, or [place].  Stop light report, NN contact information, instructions for accessing [facility] and list of educational videos given to pt.  \"A Patient's Guide to COPD\" booklet left at BS. 1800QUITNOW reference sheet discussed and given to patient at BS.  Benefits and various levels of pulmonary rehab explained.   COPD education completed in the form of explanation, handouts, and videos.  No new concerns or questions voiced at this time.  NN will continue to follow as needed.     Age: 56 y.o.  Sex: female  Smoker Status: current, ~32 pack years  Pulmonologist: DO Moses  FEV1 (PFT): 69% (8/12/2021)  Home O2: 2.5L  Objective     SpO2 SpO2: 94 % " (11/19/21 1040)  Device Device (Oxygen Therapy): NPPV/NIV (11/19/21 0715)  Flow Flow (L/min): 75 (11/19/21 0715)  Incentive Spirometer    IS Predicted Level (mL)     Number of Repetitions     Level Incentive Spirometer (mL)    Patient Tolerance     Inhaler Treatment Status Respiratory Treatment Status (Inhaler): given (11/18/21 1833)  Treatment Route Respiratory Treatment Status (Inhaler): given (11/18/21 1833)      Home Medications:  Medications Prior to Admission   Medication Sig Dispense Refill Last Dose   • albuterol (PROVENTIL) (2.5 MG/3ML) 0.083% nebulizer solution Take 2.5 mg by nebulization Every 4 (Four) Hours As Needed.      • albuterol sulfate  (90 Base) MCG/ACT inhaler Inhale 2 puffs Every 4 (Four) Hours As Needed for Wheezing.      • furosemide (LASIX) 20 MG tablet Take 20 mg by mouth Daily.      • lisinopril (PRINIVIL,ZESTRIL) 20 MG tablet Take 20 mg by mouth Daily.      • metFORMIN (GLUCOPHAGE) 500 MG tablet Take 500 mg by mouth Daily With Breakfast.      • potassium chloride (MICRO-K) 10 MEQ CR capsule Take 10 mEq by mouth Daily.      • umeclidinium-vilanterol (Anoro Ellipta) 62.5-25 MCG/INH aerosol powder  inhaler Inhale 1 puff Daily.   Unknown at Unknown time       Discussion: Per current GOLD Standards, please consider: No ICS in place, Outpatient PFT, Pulmonary rehab as appropriate, NRT at OR      Patient reports that she has called QUIT Line and is unable to get assistance with her quit attempt.  NN will investigate further what the issue might be as the patient reports a strong motivation and intent to quit.    Patient has been scheduled for a hospital follow up with [facility] for 12/3/2021 @ 11 am with GUILLERMO Montero.      Gin Villarreal RN

## 2021-11-19 NOTE — PAYOR COMM NOTE
"ELDON PARKS RN  UTILIZATION REVIEW  P:  [phone]  F:  [phone]      Notification of INPT admission.  Clinical attached.            Addy Lee (56 y.o. Female)             Date of Birth Social Security Number Address Home Phone MRN    1965  [address] [id number] [phone] [id number]    [employer] Marital Status             Protestant        Admission Date Admission Type Admitting Provider Attending Provider Department, Room/Bed    11/18/21 Emergency Faina Damon MD Opi[place], [place], MD [address], [address]    Discharge Date Discharge Disposition Discharge Destination                         Attending Provider: Faina Damon MD    Allergies: No Known Allergies    Isolation: None   Infection: None   Code Status: CPR   Advance Care Planning Activity    Ht: 172.7 cm (68\")   Wt: 132 kg (290 lb)    Admission Cmt: None   Principal Problem: Acute respiratory failure with hypoxia and hypercapnia (HCC) [J96.01,J96.02]                 Active Insurance as of 11/18/2021     Primary Coverage     Payor Plan Insurance Group Employer/Plan Group    [employer] [place] MED[place]ARE [place]PLACEMENT WELLCARE MEDICARE REPLACEMENT      Payor Plan Address Payor Plan Phone Number Payor Plan Fax Number Effective Dates    [address] [address] [phone]  9/1/2020 - None Entered    [facility] [address]       Subscriber Name Subscriber Birth Date Member ID       ADDY LEE 1965 [id number]           Secondary Coverage     Payor Plan Insurance Group Employer/Plan Group    [place]      Payor Plan Address Payor Plan Phone Number Payor Plan Fax Number Effective Dates    [address] [phone]  1/1/2020 - None Entered    [facility] [id number]       Subscriber Name Subscriber Birth Date Member ID       ADDY LEE 1965 [id number]                 Emergency Contacts      (Rel.) Home Phone Work Phone Mobile Phone    ANDREA AIKEN " (Daughter) [phone] -- [phone]               History & Physical      Frantz Marie MD at 21 8036              [facility] Medicine Services  HISTORY AND PHYSICAL    Patient Name: Jovanna Ching  : 1965  MRN: [id number]  Primary Care Physician: Provider, No Known  Date of admission: 2021    Subjective   Subjective     Chief Complaint:  Shortness of air     HPI:  Jovanna Ching is a 56 y.o. female with a past medical history significant for oxygen dependent COPD (wears 2 to 2-1/2 L continuously), diabetes mellitus type 2, essential hypertension and tobacco abuse presents to the ED via EMS due to worsening shortness of breath.  Patient reports she went to see her PCP today to follow-up for recent heart cath that was performed at [facility] (that was normal).  While she was there she was noted to be hypoxic with her oxygen saturation in the 50s along with oral cyanosis and notable shortness of breath.  It was noted that her oxygen tank was empty.  She was placed on supplemental oxygen and her O2 sat increased to the 80s.  She was brought to the ED for further evaluation and treatment.  Patient reports feeling well up until today she had noticed increased shortness of breath even at rest.  She denies any fever, chills, nausea, vomiting, diarrhea, abdominal pain, palpitations, dizziness or syncope.  She does however report having body aches today along with worsening lower extremity edema and decreased urine output despite taking her daily 20 mg of Lasix.  Upon arrival to the ED her oxygen saturation was noted to be 77%.  She was placed on 100% nonrebreather mask with O2 saturation improvement to 96.  ABG was obtained that showed PCO2 of 81.5, pH 7.28, HCO3 38.9, PO2 97.1.  Chest x-ray showed interval development of cardiomegaly and CHF.  Extensive bilateral pulmonary edema with small pleural effusions.  She was given 40 mg of IV Lasix along with Solu-Medrol.  She  reports currently improvement of her symptoms but does complain of pain between her shoulder blades that has been ongoing throughout today.  Patient will be admitted to [facility] under the care of the hospitalist for further evaluation and treatment.    Of note patient reports she has had a thoracentesis with removal of a liter of fluid approximately 5 years ago.    COVID Details:    Symptoms:    [] NONE [] Fever [x]  Cough [x] Shortness of breath [] Change in taste/smell      The patient qualifies to receive the vaccine, but they have not yet received it.      Review of Systems   Constitutional: Negative for activity change, appetite change, chills, diaphoresis, fatigue, fever and unexpected weight change.   HENT: Negative.    Eyes: Negative for photophobia and visual disturbance.   Respiratory: Positive for cough and shortness of breath.    Cardiovascular: Positive for leg swelling. Negative for chest pain and palpitations.   Gastrointestinal: Positive for abdominal distention. Negative for abdominal pain, blood in stool, diarrhea, nausea and vomiting.   Musculoskeletal: Positive for back pain. Negative for neck pain and neck stiffness.   Skin: Negative.    Neurological: Negative.    Psychiatric/Behavioral: Negative.         All other systems reviewed and are negative.     Personal History     Past Medical History:   Diagnosis Date   • Asthma    • Broken leg    • CHF (congestive heart failure) (HCC)    • COPD (chronic obstructive pulmonary disease) (HCC)    • Diabetes mellitus (HCC)    • GERD (gastroesophageal reflux disease)    • Hypertension    • Swelling        Past Surgical History:   Procedure Laterality Date   • BACK SURGERY     •  SECTION     • VASCULAR SURGERY         Family History: Family history is unknown by patient. Otherwise pertinent FHx was reviewed and unremarkable.     Social History:  reports that she has been smoking. She has a 32.00 pack-year smoking history. She has  never used smokeless tobacco. She reports that she does not drink alcohol and does not use drugs.  Social History     Social History Narrative   • Not on file       Medications:  albuterol, albuterol sulfate HFA, furosemide, lisinopril, metFORMIN, potassium chloride, and umeclidinium-vilanterol    No Known Allergies    Objective   Objective     Vital Signs:   Temp:  [98.9 °F (37.2 °C)] 98.9 °F (37.2 °C)  Heart Rate:  [83-88] 83  Resp:  [18-19] 18  BP: (116-170)/(66-99) 116/66  Flow (L/min):  [4-15] 15    Physical Exam  Vitals and nursing note reviewed.   Constitutional:       General: She is not in acute distress.     Appearance: Normal appearance. She is obese. She is not ill-appearing, toxic-appearing or diaphoretic.   HENT:      Head: Normocephalic and atraumatic.      Nose: Nose normal.      Mouth/Throat:      Mouth: Mucous membranes are dry.      Pharynx: Oropharynx is clear.   Eyes:      Extraocular Movements: Extraocular movements intact.      Conjunctiva/sclera: Conjunctivae normal.      Pupils: Pupils are equal, round, and reactive to light.   Cardiovascular:      Rate and Rhythm: Normal rate and regular rhythm.      Pulses: Normal pulses.      Heart sounds: Normal heart sounds.   Pulmonary:      Effort: Pulmonary effort is normal.      Breath sounds: Wheezing and rales present.      Comments: Crackles to bilateral lower lobes.  Inspiratory wheezes to lower lobes.   Abdominal:      General: Bowel sounds are normal. There is no distension.      Palpations: Abdomen is soft. There is no mass.      Tenderness: There is no abdominal tenderness. There is no right CVA tenderness, guarding or rebound.      Hernia: No hernia is present.   Musculoskeletal:         General: No swelling, tenderness, deformity or signs of injury. Normal range of motion.      Cervical back: Normal range of motion and neck supple.      Right lower leg: Edema present.      Left lower leg: Edema present.      Comments: 2+ pitting edema to  bilateral lower extremities extending to bilateral knees    Skin:     General: Skin is warm and dry.   Neurological:      General: No focal deficit present.      Mental Status: She is alert and oriented to person, place, and time. Mental status is at baseline.   Psychiatric:         Mood and Affect: Mood normal.         Behavior: Behavior normal.         Thought Content: Thought content normal.         Judgment: Judgment normal.            Results Reviewed:  I have personally reviewed most recent indicated data and agree with findings including:  [x]  Laboratory  [x]  Radiology  [x]  EKG/Telemetry  []  Pathology  []  Cardiac/Vascular Studies  []  Old records  []  Other:  Most pertinent findings include:      LAB RESULTS:      Lab 11/18/21  1723   WBC 11.69*   HEMOGLOBIN 13.0   HEMATOCRIT 44.3   PLATELETS 376   NEUTROS ABS 9.37*   IMMATURE GRANS (ABS) 0.06*   LYMPHS ABS 1.42   MONOS ABS 0.72   EOS ABS 0.07   MCV 93.5         Lab 11/18/21  1723   SODIUM 138   POTASSIUM 4.8   CHLORIDE 100   CO2 31.0*   ANION GAP 7.0   BUN 18   CREATININE 0.58   GLUCOSE 160*   CALCIUM 8.9         Lab 11/18/21  1723   TOTAL PROTEIN 7.3   ALBUMIN 3.80   GLOBULIN 3.5   ALT (SGPT) 35*   AST (SGOT) 27   BILIRUBIN 0.3   ALK PHOS 104         Lab 11/18/21  1723   PROBNP 610.6   TROPONIN T <0.010                 Lab 11/18/21  1848   PH, ARTERIAL 7.287*   PCO2, ARTERIAL 81.5*   PO2 ART 97.1   FIO2 80   HCO3 ART 38.9*   BASE EXCESS ART 8.9*   CARBOXYHEMOGLOBIN 2.9*     Brief Urine Lab Results     None        Microbiology Results (last 10 days)     Procedure Component Value - Date/Time    COVID PRE-OP / PRE-PROCEDURE SCREENING ORDER (NO ISOLATION) - Swab, Nasopharynx [[id number]]  (Normal) Collected: 11/18/21 1724    Lab Status: Final result Specimen: Swab from Nasopharynx Updated: 11/18/21 1809    Narrative:      The following orders were created for panel order COVID PRE-OP / PRE-PROCEDURE SCREENING ORDER (NO ISOLATION) - Swab,  Nasopharynx.  Procedure                               Abnormality         Status                     ---------                               -----------         ------                     COVID-19 and FLU A/B PCR...[[id number]]  Normal              Final result                 Please view results for these tests on the individual orders.    COVID-19 and FLU A/B PCR - Swab, Nasopharynx [[id number]]  (Normal) Collected: 11/18/21 1724    Lab Status: Final result Specimen: Swab from Nasopharynx Updated: 11/18/21 1809     COVID19 Not Detected     Influenza A PCR Not Detected     Influenza B PCR Not Detected    Narrative:      Fact sheet for providers: https://www.fda.gov/media/997229/download    Fact sheet for patients: https://www.fda.gov/media/143717/download    Test performed by PCR.          XR Chest 1 View    Result Date: 11/18/2021  EXAMINATION: XR CHEST 1 VW - 11/18/2021  INDICATION: Shortness of air.  COMPARISON: 08/12/2021  FINDINGS: There has been interval enlargement of the heart, pulmonary vasculature and interval development of extensive pulmonary interstitial edema. There are small pleural effusions. No densely consolidated lung or pneumothorax is seen.      Impression: Interval development of cardiomegaly and congestive heart failure. Extensive bilateral pulmonary interstitial edema and small pleural effusions.  DICTATED:   11/18/2021 EDITED/lfs:   11/18/2021              Assessment/Plan   Assessment & Plan       Acute respiratory failure with hypoxia and hypercapnia (HCC)    Essential hypertension    Type 2 diabetes mellitus (HCC)    Acute pulmonary edema (HCC)    COPD with acute exacerbation (HCC)      56-year-old female presents the ED with worsening shortness of breath that started today while at PCP office.    1) acute respiratory failure with hypoxia and hypercapnia  -ABG as noted above  -O2 sat on arrival 77%  -Continue BiPAP  -Repeat ABG in an hour  -Likely secondary to new CHF along with COPD  exacerbation  -Continue steroids  -Status post 40 Lasix in the ED  -Strict I's and O's  -Daily weights  -Obtain echo in a.m.  -CTA of chest showed bilateral hilar/mediastinal LAD ?reactive vs sarcoid vs CA vs Lymphma.  Needs repeat CT chest in 3mo  -Continuous pulse ox  -Keep O2 sat greater than 90%  -Consider cardiology consult  -Patient did have recent left heart cath at [facility], will obtain records  -Scheduled and as needed DuoNebs    2) COPD with acute exacerbation  -Continue IV steroids  -Scheduled and as needed DuoNeb's  -Chest x-ray as noted above  -CTA of chest pending  -Continuous pulse ox  -Keep O2 sat greater than 90%    3) acute pulmonary edema  -Echo in a.m.  -Chest x-ray noted above  -CTA of chest pending  -Consider cardiology consult  -Strict I's and O's  -Cardiomegaly noted on chest x-ray  -Reports compliance with Lasix 20 mg daily  -Received 40 mg of Lasix in the ED will repeat dose in am    4) diabetes mellitus type 2  -Check hemoglobin A1c  -Start low-dose sliding scale insulin  -May need to adjust due to steroids  -Fingersticks before meals and at bedtime    5) essential hypertension  -Continue lisinopril  -BP stable    6) tobacco abuse  -Cessation education provided  -Nicotine patch    Hilar/Mediastinal LAD  --?reactive vs sarcoid vs CA vs Lymphoma  --needs repeat CT chest in 3mos    Right Thyroid Enlargement  --check an u/s of thyroid          DVT prophylaxis:  Scds, lovenox    CODE STATUS:  Full code   This note has been completed as part of a split-shared workflow.     Signature: Electronically signed by GUILLERMO Nath, 11/18/21, 10:23 PM EST.      Attending   Admission Attestation       I have seen and examined the patient, performing an independent face-to-face diagnostic evaluation with plan of care reviewed and developed with the advanced practice clinician (APC).      Brief Summary Statement:   Jovanna Ching is a 56 y.o. female with h/o COPD home O2 dependent on 2.0 - 2.5LNC and  continued tobacco abuse with increased soa and mild cough for a few days.  She had a recent LHC with [facility] cardiologist that reportedly was normal and went to f/u with her PCP today when O2 sats where in the 50's and O2 tank was discovered to be empty.  Sats came up to 80's on 2LNC so was sent to the ER where she was find to have a respiratory acidosis with PH 7.287 and CO2 81.    Remainder of detailed HPI is as noted by APC and has been reviewed and/or edited by me for completeness.    Attending Physical Exam:  Constitutional: Awake, alert  Eyes: PERRLA, sclerae anicteric, no conjunctival injection  HENT: NCAT, mucous membranes moist  Neck: Supple, no thyromegaly, no lymphadenopathy, trachea midline  Respiratory: Bilateral wheezing, nonlabored respirations on bipap  Cardiovascular: RRR, no murmurs, rubs, or gallops, palpable pedal pulses bilaterally  Gastrointestinal: Positive bowel sounds, soft, nontender, nondistended  Musculoskeletal: 1-2+pittind edema bilateral LE's  Psychiatric: Appropriate affect, cooperative  Neurologic: Oriented x 3, strength symmetric in all extremities, Cranial Nerves grossly intact to confrontation, speech clear  Skin: No rashes      Brief Assessment/Plan :  See detailed assessment and plan developed with APC which I have reviewed and/or edited for completeness.        Admission Status: I believe that this patient meets INPATIENT status due to respiratory failure, hypoxia, hypercapnia  I feel patient’s risk for adverse outcomes and need for care warrant INPATIENT evaluation and I predict the patient’s care encounter to likely last beyond 2 midnights.        Frantz Marie MD  11/19/21                              Electronically signed by Frantz Marie MD at 11/19/21 0019       Vital Signs (last day)     Date/Time Temp Temp src Pulse Resp BP Patient Position SpO2    11/19/21 0715 -- -- 70 -- -- -- 96    11/19/21 0650 98.2 (36.8) Oral 69 22 122/79 Sitting 98    11/19/21 0415 --  -- 77 -- -- -- 96    11/19/21 0400 -- -- 75 -- -- -- 97    11/19/21 0345 99.1 (37.3) Oral 74 20 113/70 Sitting 94    11/19/21 0330 -- -- 77 -- -- -- 94    11/19/21 0315 -- -- 83 -- -- -- 86    11/19/21 0300 -- -- 78 -- -- -- 94    11/19/21 0245 -- -- 78 -- -- -- 97    11/19/21 0230 -- -- 78 -- -- -- 94    11/19/21 0215 -- -- 85 -- -- -- 90    11/19/21 0200 -- -- 80 -- -- -- 91    11/18/21 2305 -- -- -- -- -- -- 96    11/18/21 2134 98.9 (37.2) Oral 83 18 116/66 Lying 96    11/18/21 2046 -- -- -- -- 135/96 -- --    11/18/21 2000 -- -- 85 -- 136/98 -- 98    11/18/21 1930 -- -- -- -- 170/99 -- 98    11/18/21 1833 -- -- 84 -- -- -- 95    11/18/21 1703 98.9 (37.2) Oral 88 19 153/85 Sitting 77          Oxygen Therapy (last day)     Date/Time SpO2 Device (Oxygen Therapy) Flow (L/min) Oxygen Concentration (%) ETCO2 (mmHg)    11/19/21 0715 96 NPPV/NIV 75 -- --    11/19/21 0650 98 -- -- -- --    11/19/21 0600 -- NPPV/NIV 15 75 --    11/19/21 0415 96 -- -- -- --    11/19/21 0400 97 -- -- -- --    11/19/21 0354 -- NPPV/NIV 15 75 --    11/19/21 0345 94 -- -- -- --    11/19/21 0330 94 NPPV/NIV -- 75 --    11/19/21 0315 86 -- -- -- --    11/19/21 0300 94 -- -- -- --    11/19/21 0245 97 -- -- -- --    11/19/21 0230 94 -- -- -- --    11/19/21 0215 90 -- -- -- --    11/19/21 0200 91 other (see comments) 15 -- --    11/19/21 0000 -- NPPV/NIV 15 85 --    11/18/21 2305 96 NPPV/NIV -- 85 --    11/18/21 2200 -- NPPV/NIV 15 85 --    11/18/21 2134 96 nonrebreather mask 15 -- --    11/18/21 2000 98 -- -- -- --    11/18/21 1930 98 -- -- -- --    11/18/21 1905 -- -- -- 85 --    11/18/21 1833 95 nonrebreather mask -- 80 --    11/18/21 17:43:38 -- nonrebreather mask 13 -- --    11/18/21 1703 77 nasal cannula 4 -- --            Current Facility-Administered Medications   Medication Dose Route Frequency Provider Last Rate Last Admin   • acetaminophen (TYLENOL) tablet 650 mg  650 mg Oral Q4H PRN Jeanne Villarreal APRN   650 mg at 11/18/21 2236    Or    • acetaminophen (TYLENOL) 160 MG/5ML solution 650 mg  650 mg Oral Q4H PRN Cherelle, Jeanne, APRN        Or   • acetaminophen (TYLENOL) suppository 650 mg  650 mg Rectal Q4H PRN Cherelle, Jeanne, APRN       • albuterol (PROVENTIL) nebulizer solution 0.083% 2.5 mg/3mL  2.5 mg Nebulization Q4H PRN Cherelle, Jeanne, APRN       • dextrose (D50W) (25 g/50 mL) IV injection 25 g  25 g Intravenous Q15 Min PRN Cherelle, Jeanne, APRN       • dextrose (GLUTOSE) oral gel 15 g  15 g Oral Q15 Min PRN Cherelle, Jeanne, APRN       • enoxaparin (LOVENOX) syringe 40 mg  40 mg Subcutaneous Q12H Cherelle, Jeanne, APRN   40 mg at 11/19/21 0849   • glucagon (human recombinant) (GLUCAGEN DIAGNOSTIC) injection 1 mg  1 mg Subcutaneous Q15 Min PRN Cherelle, Jeanne, APRN       • insulin lispro (humaLOG) injection 0-7 Units  0-7 Units Subcutaneous TID AC Cherelle, Jeanne, APRN       • ipratropium-albuterol (DUO-NEB) nebulizer solution 3 mL  3 mL Nebulization Q4H PRN Cherelle, Jeanne, APRN       • ipratropium-albuterol (DUO-NEB) nebulizer solution 3 mL  3 mL Nebulization 4x Daily - RT Cherelle, Jeanne, APRN   3 mL at 11/19/21 0715   • lisinopril (PRINIVIL,ZESTRIL) tablet 20 mg  20 mg Oral Q24H Cherelle, Jeanne, APRN   20 mg at 11/19/21 0849   • methylPREDNISolone sodium succinate (SOLU-Medrol) injection 60 mg  60 mg Intravenous Q8H Cherelle, Jeanne, APRN   60 mg at 11/19/21 0424   • nicotine (NICODERM CQ) 14 MG/24HR patch 1 patch  1 patch Transdermal Q24H Cherelle, Jeanne, APRN   1 patch at 11/19/21 0259   • Pharmacy Consult - MTM   Does not apply Daily Georgie Trevizo, PharmD       • sodium chloride 0.9 % flush 10 mL  10 mL Intravenous PRN [place], Usman Ben, MD       • sodium chloride 0.9 % flush 10 mL  10 mL Intravenous Q12H Jeanne Villarreal APRN   10 mL at 11/19/21 0850   • sodium chloride 0.9 % flush 10 mL  10 mL Intravenous PRN Jeanne Villarreal APRN         Blood Administration Record (From admission, onward)    None         Ventilator/Non-Invasive Ventilation Settings (From admission, onward)             Start     Ordered    21  NIPPV (CPAP or BIPAP)  Until Discontinued        Question Answer Comment   Indication: Acute Respiratory Failure    Type: BIPAP        21                   Physician Progress Notes (last 24 hours)      Faina Damon MD at 21 0857              [facility] Medicine Services  PROGRESS NOTE    Patient Name: Jovanna Ching  : 1965  MRN: [id number]    Date of Admission: 2021  Primary Care Physician: Provider, No Known    Subjective   Subjective     CC: Follow-up SOA    HPI: No acute events overnight, patient was on BiPAP, currently on 4 L NC, states that she feels much better.    ROS:  Gen- No fevers, chills  CV- No chest pain, palpitations  Resp- No cough, +dyspnea  GI- No N/V/D, abd pain    All other systems reviewed and are negative    Objective   Objective     Vital Signs:   Temp:  [98.9 °F (37.2 °C)-99.1 °F (37.3 °C)] 99.1 °F (37.3 °C)  Heart Rate:  [74-88] 77  Resp:  [18-20] 20  BP: (113-170)/(66-99) 113/70  Flow (L/min):  [4-15] 15     Physical Exam:  Constitutional: Chronically ill-appearing lady, no acute distress, awake, alert, seated eating breakfast  HENT: NCAT, mucous membranes moist  Respiratory: Moderate labored respirations, diffuse coarse breath sounds, on 4 L NC  Cardiovascular: RRR, no murmurs, rubs, or gallops  Gastrointestinal: Obese positive bowel sounds, soft, nontender, nondistended  Musculoskeletal: No bilateral ankle edema  Psychiatric: Appropriate affect, cooperative  Neurologic: Oriented x 3, nonfocal  Skin: No rashes      Results Reviewed:  LAB RESULTS:      Lab 21  0406 21  1723   WBC 9.29 11.69*   HEMOGLOBIN 13.0 13.0   HEMATOCRIT 44.8 44.3   PLATELETS 355 376   NEUTROS ABS 8.40* 9.37*   IMMATURE GRANS (ABS) 0.06* 0.06*   LYMPHS ABS 0.57* 1.42   MONOS ABS 0.24 0.72   EOS ABS 0.00 0.07   MCV 94.5 93.5          Lab 11/19/21  0406 11/18/21  1723   SODIUM 138 138   POTASSIUM 5.0 4.8   CHLORIDE 96* 100   CO2 35.0* 31.0*   ANION GAP 7.0 7.0   BUN 16 18   CREATININE 0.51* 0.58   GLUCOSE 168* 160*   CALCIUM 9.0 8.9   HEMOGLOBIN A1C 7.30*  --          Lab 11/18/21  1723   TOTAL PROTEIN 7.3   ALBUMIN 3.80   GLOBULIN 3.5   ALT (SGPT) 35*   AST (SGOT) 27   BILIRUBIN 0.3   ALK PHOS 104         Lab 11/18/21  1723   PROBNP 610.6   TROPONIN T <0.010                 Lab 11/19/21  0338 11/18/21  1848   PH, ARTERIAL 7.321* 7.287*   PCO2, ARTERIAL 78.0* 81.5*   PO2 .0* 97.1   FIO2 75 80   HCO3 ART 40.2* 38.9*   BASE EXCESS ART 10.8* 8.9*   CARBOXYHEMOGLOBIN 1.3 2.9*     Brief Urine Lab Results     None          Microbiology Results Abnormal     Procedure Component Value - Date/Time    COVID PRE-OP / PRE-PROCEDURE SCREENING ORDER (NO ISOLATION) - Swab, Nasopharynx [[id number]]  (Normal) Collected: 11/18/21 1724    Lab Status: Final result Specimen: Swab from Nasopharynx Updated: 11/18/21 1809    Narrative:      The following orders were created for panel order COVID PRE-OP / PRE-PROCEDURE SCREENING ORDER (NO ISOLATION) - Swab, Nasopharynx.  Procedure                               Abnormality         Status                     ---------                               -----------         ------                     COVID-19 and FLU A/B PCR...[[id number]]  Normal              Final result                 Please view results for these tests on the individual orders.    COVID-19 and FLU A/B PCR - Swab, Nasopharynx [[id number]]  (Normal) Collected: 11/18/21 1724    Lab Status: Final result Specimen: Swab from Nasopharynx Updated: 11/18/21 1809     COVID19 Not Detected     Influenza A PCR Not Detected     Influenza B PCR Not Detected    Narrative:      Fact sheet for providers: https://www.fda.gov/media/963211/download    Fact sheet for patients: https://www.fda.gov/media/545542/download    Test performed by PCR.          XR Chest 1  View    Result Date: 11/18/2021  EXAMINATION: XR CHEST 1 VW - 11/18/2021  INDICATION: Shortness of air.  COMPARISON: 08/12/2021  FINDINGS: There has been interval enlargement of the heart, pulmonary vasculature and interval development of extensive pulmonary interstitial edema. There are small pleural effusions. No densely consolidated lung or pneumothorax is seen.      Impression: Interval development of cardiomegaly and congestive heart failure. Extensive bilateral pulmonary interstitial edema and small pleural effusions.  DICTATED:   11/18/2021 EDITED/lfs:   11/18/2021    This report was finalized on 11/18/2021 9:58 PM by Dr. Otliio Ramirez MD.      CT Angiogram Chest    Result Date: 11/18/2021  CTA Chest INDICATION: Respiratory failure and hypercapnia. Hypoxia. Heart failure. Shortness of air and back pain. TECHNIQUE: CT angiogram of the chest with IV contrast. 3-D reconstructions were obtained and reviewed.   Radiation dose reduction techniques included automated exposure control or exposure modulation based on body size. Count of known CT and cardiac nuc med studies performed in previous 12 months: 0. COMPARISON: 1/11/2009 FINDINGS: No pulmonary embolism or aortic dissection is identified. There are tiny bilateral pleural effusions. No pericardial effusion is seen. There is enlargement of the right thyroid lobe containing a hypodense nodule measuring at least 2.6 cm. This can be further evaluated with a nonemergent ultrasound as indicated. There is mediastinal and bilateral hilar adenopathy. For example, a right hilar node measures 1.4 cm. A left hilar node measures 1.2 cm. Right paratracheal node measures 1.5 cm. Several other mildly enlarged nodes are present as well. These are nonspecific. No axillary adenopathy is seen. Upper abdominal images show mild left adrenal hyperplasia. Lung windows show COPD. Consolidations in the lower lobes are likely some compressive atelectasis. There is background groundglass  opacity with some septal thickening, most likely the result of pulmonary edema. Imaging features are atypical or uncommonly reported for COVID-19 pneumonia. Alternative diagnoses should be considered. No pneumothorax is identified.     Impression: 1. No PE or aortic dissection. 2. Tiny bilateral pleural effusions with some adjacent lower lobe atelectasis. COPD is noted with background pulmonary edema. 3. Mediastinal and bilateral hilar adenopathy is nonspecific. Considerations would include reactive adenopathy, sarcoid, or even neoplasm such as lymphoma or metastatic disease. Attention on 3 month follow-up chest CT is recommended. 4. Right thyroid enlargement can be assessed with nonemergent ultrasound is needed. Signer Name: Charly Noland MD  Signed: 11/18/2021 11:03 PM  Workstation Name: [email or web address]  [facility]          I have reviewed the medications:  Scheduled Meds:enoxaparin, 40 mg, Subcutaneous, Q12H  furosemide, 40 mg, Intravenous, Once  insulin lispro, 0-7 Units, Subcutaneous, TID AC  ipratropium-albuterol, 3 mL, Nebulization, 4x Daily - RT  lisinopril, 20 mg, Oral, Q24H  methylPREDNISolone sodium succinate, 60 mg, Intravenous, Q8H  nicotine, 1 patch, Transdermal, Q24H  sodium chloride, 10 mL, Intravenous, Q12H      Continuous Infusions:   PRN Meds:.•  acetaminophen **OR** acetaminophen **OR** acetaminophen  •  albuterol  •  dextrose  •  dextrose  •  glucagon (human recombinant)  •  ipratropium-albuterol  •  sodium chloride  •  sodium chloride    Assessment/Plan   Assessment & Plan     Active Hospital Problems    Diagnosis  POA   • **Acute respiratory failure with hypoxia and hypercapnia (HCC) [J96.01, J96.02]  Yes   • Acute pulmonary edema (HCC) [J81.0]  Yes   • COPD with acute exacerbation (HCC) [J44.1]  Yes   • Tobacco abuse [Z72.0]  Yes   • Essential hypertension [I10]  Yes   • Type 2 diabetes mellitus (HCC) [E11.9]  Yes      Resolved Hospital Problems   No resolved problems to  display.        Brief Hospital Course to date:  Jovanna Ching is a 56 y.o. female with history of COPD with ongoing tobacco abuse 1 to 2 L NC,, type 2 diabetes, hypertension.  She presented to the ED with progressively worsening SOA    Acute respiratory failure with hypoxia and hypercapnia  COPD with exacerbation with ongoing tobacco abuse  Acute pulmonary edema  -Patient with O2 sats of 77% on presentation ABG with PCO2 81.5, initially 100% nonrebreather transition to BiPAP, currently on 4 L NC, she is on a baseline of 2-2.5 L at home.  She reports possible malfunction with her portable O2  -CTA chest is negative for PE, shows small bilateral effusions, COPD and pulmonary edema.  Follow-up echo  -Continue duo nebs, diuresis with 40 mg of IV Lasix ,IV Solu-Medrol  -Continue NRT  -Follow-up records from [facility] patient did have a recent [facility]    Hypertension  -BP currently low but stable, continue lisinopril with holding parameters, continue to monitor    Well-controlled type 2 diabetes with A1c 7.3%  -FSBG have been reviewed and currently appropriate, continue SSI for now.    Mediastinal and bilateral hilar adenopathy  -Need follow-up CT in 3 months    Right hilar enlargement  -Follow-up thyroid ultrasound    Morbid obesity-with BMI of 44, complicates all aspects of care.    DVT prophylaxis:  Medical and mechanical DVT prophylaxis orders are present.     All problems listed above are new to me as this is my first encounter with patient.    Disposition: TBD    CODE STATUS:   Code Status and Medical Interventions:   Ordered at: 11/18/21 9936     Level Of Support Discussed With:    Patient     Code Status (Patient has no pulse and is not breathing):    CPR (Attempt to Resuscitate)     Medical Interventions (Patient has pulse or is breathing):    Full Support       Faina Damon MD  11/19/21              Electronically signed by Faina Damon MD at 11/19/21 5940       Consult Notes (last 24 hours)  Notes from  11/18/21 0911 through 11/19/21 0911   No notes of this type exist for this encounter.

## 2021-11-19 NOTE — PROGRESS NOTES
[facility] Medicine Services  PROGRESS NOTE    Patient Name: Jovanna Ching  : 1965  MRN: [id number]    Date of Admission: 2021  Primary Care Physician: Provider, No Known    Subjective   Subjective     CC: Follow-up SOA    HPI: No acute events overnight, patient was on BiPAP, currently on 4 L NC, states that she feels much better.    ROS:  Gen- No fevers, chills  CV- No chest pain, palpitations  Resp- No cough, +dyspnea  GI- No N/V/D, abd pain    All other systems reviewed and are negative    Objective   Objective     Vital Signs:   Temp:  [98.9 °F (37.2 °C)-99.1 °F (37.3 °C)] 99.1 °F (37.3 °C)  Heart Rate:  [74-88] 77  Resp:  [18-20] 20  BP: (113-170)/(66-99) 113/70  Flow (L/min):  [4-15] 15     Physical Exam:  Constitutional: Chronically ill-appearing lady, no acute distress, awake, alert, seated eating breakfast  HENT: NCAT, mucous membranes moist  Respiratory: Moderate labored respirations, diffuse coarse breath sounds, on 4 L NC  Cardiovascular: RRR, no murmurs, rubs, or gallops  Gastrointestinal: Obese positive bowel sounds, soft, nontender, nondistended  Musculoskeletal: No bilateral ankle edema  Psychiatric: Appropriate affect, cooperative  Neurologic: Oriented x 3, nonfocal  Skin: No rashes      Results Reviewed:  LAB RESULTS:      Lab 21  0406 21  1723   WBC 9.29 11.69*   HEMOGLOBIN 13.0 13.0   HEMATOCRIT 44.8 44.3   PLATELETS 355 376   NEUTROS ABS 8.40* 9.37*   IMMATURE GRANS (ABS) 0.06* 0.06*   LYMPHS ABS 0.57* 1.42   MONOS ABS 0.24 0.72   EOS ABS 0.00 0.07   MCV 94.5 93.5         Lab 21  0406 21  1723   SODIUM 138 138   POTASSIUM 5.0 4.8   CHLORIDE 96* 100   CO2 35.0* 31.0*   ANION GAP 7.0 7.0   BUN 16 18   CREATININE 0.51* 0.58   GLUCOSE 168* 160*   CALCIUM 9.0 8.9   HEMOGLOBIN A1C 7.30*  --          Lab 21  1723   TOTAL PROTEIN 7.3   ALBUMIN 3.80   GLOBULIN 3.5   ALT (SGPT) 35*   AST (SGOT) 27   BILIRUBIN 0.3   ALK PHOS 104          Lab 11/18/21  1723   PROBNP 610.6   TROPONIN T <0.010                 Lab 11/19/21  0338 11/18/21  1848   PH, ARTERIAL 7.321* 7.287*   PCO2, ARTERIAL 78.0* 81.5*   PO2 .0* 97.1   FIO2 75 80   HCO3 ART 40.2* 38.9*   BASE EXCESS ART 10.8* 8.9*   CARBOXYHEMOGLOBIN 1.3 2.9*     Brief Urine Lab Results     None          Microbiology Results Abnormal     Procedure Component Value - Date/Time    COVID PRE-OP / PRE-PROCEDURE SCREENING ORDER (NO ISOLATION) - Swab, Nasopharynx [[id number]]  (Normal) Collected: 11/18/21 1724    Lab Status: Final result Specimen: Swab from Nasopharynx Updated: 11/18/21 1809    Narrative:      The following orders were created for panel order COVID PRE-OP / PRE-PROCEDURE SCREENING ORDER (NO ISOLATION) - Swab, Nasopharynx.  Procedure                               Abnormality         Status                     ---------                               -----------         ------                     COVID-19 and FLU A/B PCR...[[id number]]  Normal              Final result                 Please view results for these tests on the individual orders.    COVID-19 and FLU A/B PCR - Swab, Nasopharynx [[id number]]  (Normal) Collected: 11/18/21 1724    Lab Status: Final result Specimen: Swab from Nasopharynx Updated: 11/18/21 1809     COVID19 Not Detected     Influenza A PCR Not Detected     Influenza B PCR Not Detected    Narrative:      Fact sheet for providers: https://www.fda.gov/media/248408/download    Fact sheet for patients: https://www.fda.gov/media/315709/download    Test performed by PCR.          XR Chest 1 View    Result Date: 11/18/2021  EXAMINATION: XR CHEST 1 VW - 11/18/2021  INDICATION: Shortness of air.  COMPARISON: 08/12/2021  FINDINGS: There has been interval enlargement of the heart, pulmonary vasculature and interval development of extensive pulmonary interstitial edema. There are small pleural effusions. No densely consolidated lung or pneumothorax is seen.      Impression:  Interval development of cardiomegaly and congestive heart failure. Extensive bilateral pulmonary interstitial edema and small pleural effusions.  DICTATED:   11/18/2021 EDITED/lfs:   11/18/2021    This report was finalized on 11/18/2021 9:58 PM by Dr. Otilio Ramirez MD.      CT Angiogram Chest    Result Date: 11/18/2021  CTA Chest INDICATION: Respiratory failure and hypercapnia. Hypoxia. Heart failure. Shortness of air and back pain. TECHNIQUE: CT angiogram of the chest with IV contrast. 3-D reconstructions were obtained and reviewed.   Radiation dose reduction techniques included automated exposure control or exposure modulation based on body size. Count of known CT and cardiac nuc med studies performed in previous 12 months: 0. COMPARISON: 1/11/2009 FINDINGS: No pulmonary embolism or aortic dissection is identified. There are tiny bilateral pleural effusions. No pericardial effusion is seen. There is enlargement of the right thyroid lobe containing a hypodense nodule measuring at least 2.6 cm. This can be further evaluated with a nonemergent ultrasound as indicated. There is mediastinal and bilateral hilar adenopathy. For example, a right hilar node measures 1.4 cm. A left hilar node measures 1.2 cm. Right paratracheal node measures 1.5 cm. Several other mildly enlarged nodes are present as well. These are nonspecific. No axillary adenopathy is seen. Upper abdominal images show mild left adrenal hyperplasia. Lung windows show COPD. Consolidations in the lower lobes are likely some compressive atelectasis. There is background groundglass opacity with some septal thickening, most likely the result of pulmonary edema. Imaging features are atypical or uncommonly reported for COVID-19 pneumonia. Alternative diagnoses should be considered. No pneumothorax is identified.     Impression: 1. No PE or aortic dissection. 2. Tiny bilateral pleural effusions with some adjacent lower lobe atelectasis. COPD is noted with background  pulmonary edema. 3. Mediastinal and bilateral hilar adenopathy is nonspecific. Considerations would include reactive adenopathy, sarcoid, or even neoplasm such as lymphoma or metastatic disease. Attention on 3 month follow-up chest CT is recommended. 4. Right thyroid enlargement can be assessed with nonemergent ultrasound is needed. Signer Name: Charly Noland MD  Signed: 11/18/2021 11:03 PM  Workstation Name: [facility]  [facility]          I have reviewed the medications:  Scheduled Meds:enoxaparin, 40 mg, Subcutaneous, Q12H  furosemide, 40 mg, Intravenous, Once  insulin lispro, 0-7 Units, Subcutaneous, TID AC  ipratropium-albuterol, 3 mL, Nebulization, 4x Daily - RT  lisinopril, 20 mg, Oral, Q24H  methylPREDNISolone sodium succinate, 60 mg, Intravenous, Q8H  nicotine, 1 patch, Transdermal, Q24H  sodium chloride, 10 mL, Intravenous, Q12H      Continuous Infusions:   PRN Meds:.•  acetaminophen **OR** acetaminophen **OR** acetaminophen  •  albuterol  •  dextrose  •  dextrose  •  glucagon (human recombinant)  •  ipratropium-albuterol  •  sodium chloride  •  sodium chloride    Assessment/Plan   Assessment & Plan     Active Hospital Problems    Diagnosis  POA   • **Acute respiratory failure with hypoxia and hypercapnia (HCC) [J96.01, J96.02]  Yes   • Acute pulmonary edema (HCC) [J81.0]  Yes   • COPD with acute exacerbation (HCC) [J44.1]  Yes   • Tobacco abuse [Z72.0]  Yes   • Essential hypertension [I10]  Yes   • Type 2 diabetes mellitus (HCC) [E11.9]  Yes      Resolved Hospital Problems   No resolved problems to display.        Brief Hospital Course to date:  Jovanna Ching is a 56 y.o. female with history of COPD with ongoing tobacco abuse 1 to 2 L NC,, type 2 diabetes, hypertension.  She presented to the ED with progressively worsening SOA    Acute respiratory failure with hypoxia and hypercapnia  COPD with exacerbation with ongoing tobacco abuse  Acute pulmonary edema  -Patient with O2 sats  of 77% on presentation ABG with PCO2 81.5, initially 100% nonrebreather transition to BiPAP, currently on 4 L NC, she is on a baseline of 2-2.5 L at home.  She reports possible malfunction with her portable O2  -CTA chest is negative for PE, shows small bilateral effusions, COPD and pulmonary edema.  Follow-up echo  -Continue duo nebs, diuresis with 40 mg of IV Lasix ,IV Solu-Medrol  -Continue NRT  -Follow-up records from [facility] patient did have a recent [facility]    Hypertension  -BP currently low but stable, continue lisinopril with holding parameters, continue to monitor    Well-controlled type 2 diabetes with A1c 7.3%  -FSBG have been reviewed and currently appropriate, continue SSI for now.    Mediastinal and bilateral hilar adenopathy  -Need follow-up CT in 3 months    Right hilar enlargement  -Follow-up thyroid ultrasound    Morbid obesity-with BMI of 44, complicates all aspects of care.    DVT prophylaxis:  Medical and mechanical DVT prophylaxis orders are present.     All problems listed above are new to me as this is my first encounter with patient.    Disposition: TBD    CODE STATUS:   Code Status and Medical Interventions:   Ordered at: 11/18/21 7151     Level Of Support Discussed With:    Patient     Code Status (Patient has no pulse and is not breathing):    CPR (Attempt to Resuscitate)     Medical Interventions (Patient has pulse or is breathing):    Full Support       Faina Damon MD  11/19/21

## 2021-11-20 LAB
ANION GAP SERPL CALCULATED.3IONS-SCNC: 5 MMOL/L (ref 5–15)
BUN SERPL-MCNC: 22 MG/DL (ref 6–20)
BUN/CREAT SERPL: 43.1 (ref 7–25)
CALCIUM SPEC-SCNC: 8.9 MG/DL (ref 8.6–10.5)
CHLORIDE SERPL-SCNC: 95 MMOL/L (ref 98–107)
CO2 SERPL-SCNC: 39 MMOL/L (ref 22–29)
CREAT SERPL-MCNC: 0.51 MG/DL (ref 0.57–1)
GFR SERPL CREATININE-BSD FRML MDRD: 124 ML/MIN/1.73
GLUCOSE BLDC GLUCOMTR-MCNC: 143 MG/DL (ref 70–130)
GLUCOSE BLDC GLUCOMTR-MCNC: 265 MG/DL (ref 70–130)
GLUCOSE BLDC GLUCOMTR-MCNC: 299 MG/DL (ref 70–130)
GLUCOSE BLDC GLUCOMTR-MCNC: 392 MG/DL (ref 70–130)
GLUCOSE BLDC GLUCOMTR-MCNC: 426 MG/DL (ref 70–130)
GLUCOSE BLDC GLUCOMTR-MCNC: 440 MG/DL (ref 70–130)
GLUCOSE SERPL-MCNC: 195 MG/DL (ref 65–99)
POTASSIUM SERPL-SCNC: 4.3 MMOL/L (ref 3.5–5.2)
SODIUM SERPL-SCNC: 139 MMOL/L (ref 136–145)

## 2021-11-20 PROCEDURE — 97530 THERAPEUTIC ACTIVITIES: CPT

## 2021-11-20 PROCEDURE — 63710000001 INSULIN LISPRO (HUMAN) PER 5 UNITS: Performed by: INTERNAL MEDICINE

## 2021-11-20 PROCEDURE — 63710000001 INSULIN DETEMIR PER 5 UNITS: Performed by: INTERNAL MEDICINE

## 2021-11-20 PROCEDURE — 94799 UNLISTED PULMONARY SVC/PX: CPT

## 2021-11-20 PROCEDURE — 97165 OT EVAL LOW COMPLEX 30 MIN: CPT

## 2021-11-20 PROCEDURE — 25010000002 ENOXAPARIN PER 10 MG: Performed by: NURSE PRACTITIONER

## 2021-11-20 PROCEDURE — 94660 CPAP INITIATION&MGMT: CPT

## 2021-11-20 PROCEDURE — 97535 SELF CARE MNGMENT TRAINING: CPT

## 2021-11-20 PROCEDURE — 82962 GLUCOSE BLOOD TEST: CPT

## 2021-11-20 PROCEDURE — 25010000002 METHYLPREDNISOLONE PER 125 MG: Performed by: NURSE PRACTITIONER

## 2021-11-20 PROCEDURE — 25010000002 FUROSEMIDE PER 20 MG: Performed by: INTERNAL MEDICINE

## 2021-11-20 PROCEDURE — 80048 BASIC METABOLIC PNL TOTAL CA: CPT | Performed by: INTERNAL MEDICINE

## 2021-11-20 PROCEDURE — 97161 PT EVAL LOW COMPLEX 20 MIN: CPT

## 2021-11-20 PROCEDURE — 63710000001 INSULIN LISPRO (HUMAN) PER 5 UNITS: Performed by: NURSE PRACTITIONER

## 2021-11-20 PROCEDURE — 99232 SBSQ HOSP IP/OBS MODERATE 35: CPT | Performed by: INTERNAL MEDICINE

## 2021-11-20 RX ORDER — FUROSEMIDE 10 MG/ML
40 INJECTION INTRAMUSCULAR; INTRAVENOUS DAILY
Status: DISCONTINUED | OUTPATIENT
Start: 2021-11-20 | End: 2021-11-22

## 2021-11-20 RX ORDER — BENZONATATE 100 MG/1
100 CAPSULE ORAL 3 TIMES DAILY PRN
Status: DISCONTINUED | OUTPATIENT
Start: 2021-11-20 | End: 2021-11-23 | Stop reason: HOSPADM

## 2021-11-20 RX ADMIN — ENOXAPARIN SODIUM 40 MG: 40 INJECTION SUBCUTANEOUS at 10:23

## 2021-11-20 RX ADMIN — IPRATROPIUM BROMIDE AND ALBUTEROL SULFATE 3 ML: 2.5; .5 SOLUTION RESPIRATORY (INHALATION) at 08:55

## 2021-11-20 RX ADMIN — ENOXAPARIN SODIUM 40 MG: 40 INJECTION SUBCUTANEOUS at 20:30

## 2021-11-20 RX ADMIN — INSULIN LISPRO 7 UNITS: 100 INJECTION, SOLUTION INTRAVENOUS; SUBCUTANEOUS at 18:22

## 2021-11-20 RX ADMIN — ACETAMINOPHEN 650 MG: 325 TABLET, FILM COATED ORAL at 04:06

## 2021-11-20 RX ADMIN — LISINOPRIL 20 MG: 20 TABLET ORAL at 10:22

## 2021-11-20 RX ADMIN — IPRATROPIUM BROMIDE AND ALBUTEROL SULFATE 3 ML: 2.5; .5 SOLUTION RESPIRATORY (INHALATION) at 20:52

## 2021-11-20 RX ADMIN — IPRATROPIUM BROMIDE AND ALBUTEROL SULFATE 3 ML: 2.5; .5 SOLUTION RESPIRATORY (INHALATION) at 16:07

## 2021-11-20 RX ADMIN — INSULIN LISPRO 7 UNITS: 100 INJECTION, SOLUTION INTRAVENOUS; SUBCUTANEOUS at 10:24

## 2021-11-20 RX ADMIN — METHYLPREDNISOLONE SODIUM SUCCINATE 60 MG: 125 INJECTION, POWDER, FOR SOLUTION INTRAMUSCULAR; INTRAVENOUS at 10:25

## 2021-11-20 RX ADMIN — IPRATROPIUM BROMIDE AND ALBUTEROL SULFATE 3 ML: 2.5; .5 SOLUTION RESPIRATORY (INHALATION) at 12:59

## 2021-11-20 RX ADMIN — SODIUM CHLORIDE, PRESERVATIVE FREE 10 ML: 5 INJECTION INTRAVENOUS at 10:23

## 2021-11-20 RX ADMIN — INSULIN DETEMIR 5 UNITS: 100 INJECTION, SOLUTION SUBCUTANEOUS at 10:46

## 2021-11-20 RX ADMIN — FUROSEMIDE 40 MG: 40 INJECTION, SOLUTION INTRAMUSCULAR; INTRAVENOUS at 10:22

## 2021-11-20 RX ADMIN — ACETAMINOPHEN 650 MG: 325 TABLET, FILM COATED ORAL at 18:21

## 2021-11-20 RX ADMIN — INSULIN DETEMIR 5 UNITS: 100 INJECTION, SOLUTION SUBCUTANEOUS at 20:30

## 2021-11-20 RX ADMIN — METHYLPREDNISOLONE SODIUM SUCCINATE 60 MG: 125 INJECTION, POWDER, FOR SOLUTION INTRAMUSCULAR; INTRAVENOUS at 04:02

## 2021-11-20 RX ADMIN — ACETAMINOPHEN 650 MG: 325 TABLET, FILM COATED ORAL at 13:55

## 2021-11-20 RX ADMIN — METHYLPREDNISOLONE SODIUM SUCCINATE 60 MG: 125 INJECTION, POWDER, FOR SOLUTION INTRAMUSCULAR; INTRAVENOUS at 20:30

## 2021-11-20 RX ADMIN — INSULIN LISPRO 3 UNITS: 100 INJECTION, SOLUTION INTRAVENOUS; SUBCUTANEOUS at 18:23

## 2021-11-20 RX ADMIN — NICOTINE 1 PATCH: 14 PATCH, EXTENDED RELEASE TRANSDERMAL at 05:07

## 2021-11-20 NOTE — PLAN OF CARE
Goal Outcome Evaluation: no changes overnight, pt on bipap. Progressing toward d/c.

## 2021-11-20 NOTE — THERAPY EVALUATION
Patient Name: Jovanna Ching  : 1965    MRN: [id number]                              Today's Date: 2021       Admit Date: 2021    Visit Dx:     ICD-10-CM ICD-9-CM   1. Acute respiratory failure with hypercapnia (HCC)  J96.02 518.81   2. Acute on chronic congestive heart failure, unspecified heart failure type (HCC)  I50.9 428.0   3. COPD exacerbation ([facility])  J44.1 491.21   4. Impaired functional mobility, balance, gait, and endurance  Z74.09 V49.89     Patient Active Problem List   Diagnosis   • Mucopurulent chronic bronchitis (HCC)   • Essential hypertension   • Type 2 diabetes mellitus (HCC)   • Chronic respiratory failure with hypoxia (on 3 L nasal cannula)   • Acute respiratory failure with hypoxia and hypercapnia (HCC)   • Acute pulmonary edema (HCC)   • COPD with acute exacerbation ([facility])   • Tobacco abuse     Past Medical History:   Diagnosis Date   • Asthma    • Broken leg    • CHF (congestive heart failure) ([facility])    • COPD (chronic obstructive pulmonary disease) (HCC)    • Diabetes mellitus (HCC)    • GERD (gastroesophageal reflux disease)    • Hypertension    • Swelling      Past Surgical History:   Procedure Laterality Date   • BACK SURGERY     •  SECTION     • VASCULAR SURGERY        General Information     Row Name 21 1407          Physical Therapy Time and Intention    Document Type evaluation  -     Mode of Treatment physical therapy  -     Row Name 21 1407          General Information    Patient Profile Reviewed yes  -SJ     Prior Level of Function independent:; all household mobility; gait; transfer; bed mobility; ADL's; driving; using stairs  -     Existing Precautions/Restrictions oxygen therapy device and L/min  -SJ     Barriers to Rehab previous functional deficit  -     Row Name 21 1407          Living Environment    Lives With parent(s)  -     Row Name 21 1407          Home Main Entrance    Number of Stairs, Main Entrance two  -SJ      Row Name 11/20/21 1407          Stairs Within Home, Primary    Number of Stairs, Within Home, Primary none  -     Row Name 11/20/21 1407          Cognition    Orientation Status (Cognition) oriented x 4  -     Row Name 11/20/21 1407          Safety Issues, Functional Mobility    Impairments Affecting Function (Mobility) endurance/activity tolerance; shortness of breath  -           User Key  (r) = Recorded By, (t) = Taken By, (c) = Cosigned By    Initials Name Provider Type    SJ Siria Escalante PT Physical Therapist               Mobility     Row Name 11/20/21 1443          Bed Mobility    Comment (Bed Mobility) seated EOB upon arrival  -     Row Name 11/20/21 1443          Transfers    Comment (Transfers) pt able to perform without difficulty  -     Row Name 11/20/21 1443          Sit-Stand Transfer    Sit-Stand Emanuel (Transfers) modified independence  -     Assistive Device (Sit-Stand Transfers) walker, front-wheeled  -SJ     Row Name 11/20/21 1443          Gait/Stairs (Locomotion)    Emanuel Level (Gait) standby assist  -     Assistive Device (Gait) walker, front-wheeled  -     Distance in Feet (Gait) 60  -SJ     Deviations/Abnormal Patterns (Gait) bilateral deviations; gait speed decreased  -SJ     Bilateral Gait Deviations forward flexed posture  -SJ     Comment (Gait/Stairs) Pt amb 60ft with RW with SBA on 6LO2nc, desat to 88% with activity. No LOB, good safety awareness with lines.  -           User Key  (r) = Recorded By, (t) = Taken By, (c) = Cosigned By    Initials Name Provider Type    Siria Montalvo PT Physical Therapist               Obj/Interventions     Row Name 11/20/21 1445          Range of Motion Comprehensive    General Range of Motion bilateral lower extremity ROM WFL  -     Row Name 11/20/21 1445          Strength Comprehensive (MMT)    General Manual Muscle Testing (MMT) Assessment no strength deficits identified  -     Comment, General Manual Muscle  Testing (MMT) Assessment BLE's 5/5  -SJ           User Key  (r) = Recorded By, (t) = Taken By, (c) = Cosigned By    Initials Name Provider Type    Siria Montalvo PT Physical Therapist               Goals/Plan     Row Name 11/20/21 1457          Bed Mobility Goal 1 (PT)    Activity/Assistive Device (Bed Mobility Goal 1, PT) sit to supine; supine to sit  -SJ     Holcomb Level/Cues Needed (Bed Mobility Goal 1, PT) modified independence  -SJ     Time Frame (Bed Mobility Goal 1, PT) long term goal (LTG); 2 weeks  -SJ     Row Name 11/20/21 6773          Transfer Goal 1 (PT)    Activity/Assistive Device (Transfer Goal 1, PT) sit-to-stand/stand-to-sit; bed-to-chair/chair-to-bed  -SJ     Holcomb Level/Cues Needed (Transfer Goal 1, PT) modified independence  -SJ     Time Frame (Transfer Goal 1, PT) long term goal (LTG); 2 weeks  -SJ     Row Name 11/20/21 9663          Gait Training Goal 1 (PT)    Activity/Assistive Device (Gait Training Goal 1, PT) increase endurance/gait distance; walker, rolling  -SJ     Holcomb Level (Gait Training Goal 1, PT) modified independence  -SJ     Distance (Gait Training Goal 1, PT) 200  -SJ     Time Frame (Gait Training Goal 1, PT) long term goal (LTG); 2 weeks  -SJ           User Key  (r) = Recorded By, (t) = Taken By, (c) = Cosigned By    Initials Name Provider Type    Siria Montalvo PT Physical Therapist               Clinical Impression     Row Name 11/20/21 0498          Pain    Additional Documentation Pain Scale: Numbers Pre/Post-Treatment (Group)  -     Row Name 11/20/21 7798          Pain Scale: Numbers Pre/Post-Treatment    Pretreatment Pain Rating 0/10 - no pain  -     Posttreatment Pain Rating 0/10 - no pain  -     Row Name 11/20/21 9876          Plan of Care Review    Plan of Care Reviewed With patient  -     Outcome Summary PT eval completed. Pt presents with decreased activity alexandre and difficulty walking per PLOF due to resp status. Pt amb 60ft  with RW with SBA on 6LO2nc, desat to 88% with activity. No LOB, good safety awareness with lines. PT rec d/c home with outpatient pulmonary rehab.  -     Row Name 11/20/21 1445          Therapy Assessment/Plan (PT)    Patient/Family Therapy Goals Statement (PT) return home  -SJ     Rehab Potential (PT) good, to achieve stated therapy goals  -     Criteria for Skilled Interventions Met (PT) yes; skilled treatment is necessary  -SJ     Predicted Duration of Therapy Intervention (PT) 2wks  -     Row Name 11/20/21 1445          Vital Signs    Pre Systolic BP Rehab 128  -SJ     Pre Treatment Diastolic BP 76  -SJ     Pretreatment Heart Rate (beats/min) 97  -SJ     Pre SpO2 (%) 88  -SJ     O2 Delivery Pre Treatment nasal cannula  -SJ     Intra SpO2 (%) 88  -SJ     O2 Delivery Intra Treatment nasal cannula  -SJ     Post SpO2 (%) 92  -SJ     O2 Delivery Post Treatment nasal cannula  -SJ     Pre Patient Position Sitting  -SJ     Post Patient Position Sitting  -     Row Name 11/20/21 1445          Positioning and Restraints    Pre-Treatment Position in bed  -SJ     Post Treatment Position bed  -SJ     In Bed sitting EOB; call light within reach; encouraged to call for assist  -SJ           User Key  (r) = Recorded By, (t) = Taken By, (c) = Cosigned By    Initials Name Provider Type    Siria Montalvo, PT Physical Therapist               Outcome Measures     Row Name 11/20/21 1454 11/20/21 0855       How much help from another person do you currently need...    Turning from your back to your side while in flat bed without using bedrails? 4  -SJ 4 (P)   -MP    Moving from lying on back to sitting on the side of a flat bed without bedrails? 4  -SJ 4 (P)   -MP    Moving to and from a bed to a chair (including a wheelchair)? 4  -SJ 3 (P)   -MP    Standing up from a chair using your arms (e.g., wheelchair, bedside chair)? 4  -SJ 3 (P)   -MP    Climbing 3-5 steps with a railing? 4  -SJ 3 (P)   -MP    To walk in hospital  room? 4  - 3 (P)   -MP    AM-PAC 6 Clicks Score (PT) 24  - 20 (P)   -MP    Row Name 11/20/21 1454 11/20/21 1129       Functional Assessment    Outcome Measure Options AM-PAC 6 Clicks Basic Mobility (PT)  - AM-PAC 6 Clicks Daily Activity (OT)  -MIGUEL          User Key  (r) = Recorded By, (t) = Taken By, (c) = Cosigned By    Initials Name Provider Type    MIGUEL Ya Mays, OT Occupational Therapist    Siria Montalvo, PT Physical Therapist    Brandi Cristina, Nursing Student Nursing Student                             Physical Therapy Education                 Title: PT OT SLP Therapies (In Progress)     Topic: Physical Therapy (Done)     Point: Mobility training (Done)     Learning Progress Summary           Patient Eager, E, VU,DU by  at 11/20/2021 1455                   Point: Body mechanics (Done)     Learning Progress Summary           Patient Eager, E, VU,DU by  at 11/20/2021 1455                   Point: Precautions (Done)     Learning Progress Summary           Patient Eager, E, VU,DU by  at 11/20/2021 1455                               User Key     Initials Effective Dates Name Provider Type Discipline     06/16/21 -  Siria Escalante, PT Physical Therapist PT              PT Recommendation and Plan  Planned Therapy Interventions (PT): balance training, bed mobility training, gait training, home exercise program, strengthening, patient/family education, transfer training  Plan of Care Reviewed With: patient  Outcome Summary: PT eval completed. Pt presents with decreased activity alexandre and difficulty walking per PLOF due to resp status. Pt amb 60ft with RW with SBA on 6LO2nc, desat to 88% with activity. No LOB, good safety awareness with lines. PT rec d/c home with outpatient pulmonary rehab.     Time Calculation:    PT Charges     Row Name 11/20/21 1455             Time Calculation    Start Time 1407  -      PT Non-Billable Time (min) 48 min  -      PT Received On 11/20/21  -       PT Goal Re-Cert Due Date 11/30/21  -            User Key  (r) = Recorded By, (t) = Taken By, (c) = Cosigned By    Initials Name Provider Type     Siria Escalante PT Physical Therapist              Therapy Charges for Today     Code Description Service Date Service Provider Modifiers Qty    75752808314 HC PT EVAL LOW COMPLEXITY 4 11/20/2021 Siria Escalante PT GP 1          PT G-Codes  Outcome Measure Options: AM-PAC 6 Clicks Basic Mobility (PT)  AM-PAC 6 Clicks Score (PT): 24  AM-PAC 6 Clicks Score (OT): 23    Siria Escalante PT  11/20/2021

## 2021-11-20 NOTE — PLAN OF CARE
Goal Outcome Evaluation:  Plan of Care Reviewed With: patient        Progress: no change  Outcome Summary: OT evaluation completed with pt. demonstrating impaired high level balance without walker use and decreased activity tolerance impacting ADL indendence. Pt. issued toilet aid due to struggling with posterior wiping.  02 sat drop with mvmt to [facility] [place], [place] and back and up with ryan curtis. Skilled OT services appropriate for building endurance/actiivty tolerance and education on EC/WS techniques.  Recommend home with HH at discharge if pt. able to progress with lower 02 levels otherwise may need short term pulmonary rehab.

## 2021-11-20 NOTE — PLAN OF CARE
Goal Outcome Evaluation:  Plan of Care Reviewed With: patient           Outcome Summary: PT eval completed. Pt presents with decreased activity alexandre and difficulty walking per PLOF due to resp status. Pt amb 60ft with RW with SBA on 6LO2nc, desat to 88% with activity. No LOB, good safety awareness with lines. PT rec d/c home with outpatient pulmonary rehab.

## 2021-11-20 NOTE — THERAPY EVALUATION
Patient Name: Jovanna Ching  : 1965    MRN: [id number]                              Today's Date: 2021       Admit Date: 2021    Visit Dx:     ICD-10-CM ICD-9-CM   1. Acute respiratory failure with hypercapnia (HCC)  J96.02 518.81   2. Acute on chronic congestive heart failure, unspecified heart failure type (HCC)  I50.9 428.0   3. COPD exacerbation (Formerly [facility])  J44.1 491.21     Patient Active Problem List   Diagnosis   • Mucopurulent chronic bronchitis (HCC)   • Essential hypertension   • Type 2 diabetes mellitus (HCC)   • Chronic respiratory failure with hypoxia (on 3 L nasal cannula)   • Acute respiratory failure with hypoxia and hypercapnia (HCC)   • Acute pulmonary edema (HCC)   • COPD with acute exacerbation (HCC)   • Tobacco abuse     Past Medical History:   Diagnosis Date   • Asthma    • Broken leg    • CHF (congestive heart failure) (HCC)    • COPD (chronic obstructive pulmonary disease) (HCC)    • Diabetes mellitus (HCC)    • GERD (gastroesophageal reflux disease)    • Hypertension    • Swelling      Past Surgical History:   Procedure Laterality Date   • BACK SURGERY     •  SECTION     • VASCULAR SURGERY        General Information     Row Name 21 1112          OT Time and Intention    Document Type evaluation  -MIGUEL     Mode of Treatment individual therapy; occupational therapy  -MIGUEL     Row Name 21 1112          General Information    Patient Profile Reviewed yes  -MIGUEL     Prior Level of Function independent:; all household mobility; community mobility; ADL's; home management; driving; shopping  per pt. she leans on a regular cart to shop, pt. reports she does struggle with wiping herself posteriorly  -MIGUEL     Existing Precautions/Restrictions oxygen therapy device and L/min  -MIGUEL     Barriers to Rehab previous functional deficit  pt. on  priorly needing to pace self with activity, rollator use  -MIGUEL     Row Name 21 1112          Living Environment    Lives With parent(s)   mother there with pt, but per pt. her mother does not assist her or with HM tasks.  -MIGUEL     Row Name 11/20/21 1112          Stairs Within Home, Primary    Stairs, Within Home, Primary see PT note for steps  -MIGUEL     Stairs Comment, Within Home, Primary Pt. with tub shower combo, standard toilet, rollator, rx wx. 02  -MIGUEL     Row Name 11/20/21 1112          Cognition    Orientation Status (Cognition) oriented x 4  -MIGUEL     Row Name 11/20/21 1112          Safety Issues, Functional Mobility    Safety Issues Affecting Function (Mobility) safety precaution awareness  educated on need to call for assist with bed to chair just due to lines  -MIGUEL     Impairments Affecting Function (Mobility) endurance/activity tolerance; balance  -MIGUEL           User Key  (r) = Recorded By, (t) = Taken By, (c) = Cosigned By    Initials Name Provider Type    Ya Bailey, OT Occupational Therapist                 Mobility/ADL's     Row Name 11/20/21 1115          Bed Mobility    Bed Mobility supine-sit  -MIGUEL     Supine-Sit [place] (Bed Mobility) modified independence  -MIGUEL     Assistive Device (Bed Mobility) head of bed elevated  -MIGUEL     Row Name 11/20/21 1115          Transfers    Transfers sit-stand transfer; toilet transfer  -MIGUEL     Comment (Transfers) pt. to BSC when up in walker and then placed close to chair and pt. up and used BSC and back to recliner without walker second time up.  Educated how to manage lines and other.  -MIGUEL     Sit-Stand [place] (Transfers) modified independence  -MIGUEL     [place] Level (Toilet Transfer) modified independence  -MIGUEL     Assistive Device (Toilet Transfer) commode, bedside without drop arms  -MIGUEL     Row Name 11/20/21 1115          Sit-Stand Transfer    Assistive Device (Sit-Stand Transfers) walker, front-wheeled  -MIGUEL     Row Name 11/20/21 1115          Toilet Transfer    Type (Toilet Transfer) sit-stand; stand-sit  -MIGUEL     Row Name 11/20/21 1115          Functional Mobility    Functional  Mobility- Ind. Level contact guard assist  -MIGUEL     Functional Mobility- Device rolling walker  -MIGUEL     Functional Mobility-Distance (Feet) 24  -MIGUEL     Functional Mobility- Safety Issues supplemental O2  -MIGUEL     Functional Mobility- Comment 02 sensor not working when returned to chair, once sensor replaced pt. 91% on 6l  -MIGUEL     Row Name 11/20/21 1115          Activities of Daily Living    BADL Assessment/Intervention lower body dressing; toileting  -MIGUEL     Row Name 11/20/21 1115          Lower Body Dressing Assessment/Training    [place] Level (Lower Body Dressing) doff; don; socks; independent  -MIGUEL     Position (Lower Body Dressing) edge of bed sitting  -MIGUEL     Comment (Lower Body Dressing) good flexibility in hips  -MIGUEL     Row Name 11/20/21 1115          Toileting Assessment/Training    [place] Level (Toileting) adjust/manage clothing; perform perineal hygiene; modified independence  -MIGUEL     Assistive Devices (Toileting) commode, bedside without drop arms  -MIGUEL     Position (Toileting) unsupported sitting; unsupported standing  -MIGUEL     Comment (Toileting) pt. able to wipe self anteriorly, toilet aids issued and educated on for pt. to attempt with posterior wiping  -           User Key  (r) = Recorded By, (t) = Taken By, (c) = Cosigned By    Initials Name Provider Type    Ya Bailey, OT Occupational Therapist               Obj/Interventions     Row Name 11/20/21 1119          Sensory Assessment (Somatosensory)    Sensory Assessment (Somatosensory) UE sensation intact  -[facility] Name 11/20/21 1119          Vision Assessment/Intervention    Visual Impairment/Limitations WFL  -     Row Name 11/20/21 1119          Range of Motion Comprehensive    General Range of Motion bilateral upper extremity ROM WNL  -     Row Name 11/20/21 1119          Strength Comprehensive (MMT)    General Manual Muscle Testing (MMT) Assessment no strength deficits identified  -     Comment, General Manual Muscle  Testing (MMT) Assessment BUE  -     Row Name 11/20/21 1119          Motor Skills    Motor Skills functional endurance  -     Functional Endurance fair-  -     Row Name 11/20/21 1119          Balance    Balance Assessment sitting static balance; sitting dynamic balance; standing static balance; standing dynamic balance  -MIGUEL     Static Sitting Balance WFL; unsupported; sitting in chair; sitting, edge of bed  -MIGUEL     Dynamic Sitting Balance WFL; unsupported; sitting in chair; sitting, edge of bed  -MIGUEL     Static Standing Balance WFL; supported; standing  -MIGUEL     Dynamic Standing Balance WFL; supported; standing  -MIGUEL     Balance Interventions occupation based/functional task  -           User Key  (r) = Recorded By, (t) = Taken By, (c) = Cosigned By    Initials Name Provider Type    Ya Bailey OT Occupational Therapist               Goals/Plan     Row Name 11/20/21 1126          Toileting Goal 1 (OT)    Activity/Device (Toileting Goal 1, OT) perform perineal hygiene; toilet paper aid  -     [place] Level/Cues Needed (Toileting Goal 1, OT) supervision required; verbal cues required  -MIGUEL     Time Frame (Toileting Goal 1, OT) long term goal (LTG); 10 days  -MIGUEL     Progress/Outcome (Toileting Goal 1, OT) goal ongoing  -MIGUEL     Row Name 11/20/21 1126          ROM Goal 1 (OT)    ROM Goal 1 (OT) Pt. will complete a 5 minute ADL tasks sitting and standing with 02 sats 92%+ on 02 NC.  -MIGUEL     Time Frame (ROM Goal 1, OT) long term goal (LTG); 10 days  -MIGUEL     Progress/Outcome (ROM Goal 1, OT) goal ongoing  -MIGUEL     Row Name 11/20/21 1126          Strength Goal 1 (OT)    Strength Goal 1 (OT) Pt. will be independent with pulmonary TE 10 reps each.  -MIGUEL     Time Frame (Strength Goal 1, OT) long term goal (LTG); 10 days  -MIGUEL     Progress/Outcome (Strength Goal 1, OT) goal ongoing  -     Row Name 11/20/21 1126          Problem Specific Goal 1 (OT)    Problem Specific Goal 1 (OT) Pt. will verbalize understanding  of EC/WS techniques for BADL and IADL tasks and options free and paid for AD for home use.  -MIGUEL     Time Frame (Problem Specific Goal 1, OT) long term goal (LTG); 10 days  -MIGUEL     Progress/Outcome (Problem Specific Goal 1, OT) goal ongoing  -MIGUEL     Row Name 11/20/21 1126          Therapy Assessment/Plan (OT)    Planned Therapy Interventions (OT) activity tolerance training; BADL retraining; occupation/activity based interventions; patient/caregiver education/training; ROM/therapeutic exercise  -MIGUEL           User Key  (r) = Recorded By, (t) = Taken By, (c) = Cosigned By    Initials Name Provider Type    Ya Bailey, OT Occupational Therapist               Clinical Impression     Row Name 11/20/21 1120          Plan of Care Review    Plan of Care Reviewed With patient  -MIGUEL     Progress no change  -MIGUEL     Outcome Summary OT evaluation completed with pt. demonstrating impaired high level balance without walker use and decreased activity tolerance impacting ADL indendence. Pt. issued toilet aid due to struggling with posterior wiping.  02 sat drop with mvmt to [facility] and back and up with wnicho curtis. Skilled OT services appropriate for building endurance/actiivty tolerance and education on EC/WS techniques.  Recommend home with HH at discharge if pt. able to progress with lower 02 levels otherwise may need short term pulmonary rehab.  -MIGUEL     Row Name 11/20/21 1120          Therapy Assessment/Plan (OT)    Patient/Family Therapy Goal Statement (OT) return to PLOF  -MIGUEL     Rehab Potential (OT) good, to achieve stated therapy goals  -MIGUEL     Criteria for Skilled Therapeutic Interventions Met (OT) yes; meets criteria; skilled treatment is necessary  -MIGUEL     Therapy Frequency (OT) daily  -MIGUEL     Row Name 11/20/21 1120          Therapy Plan Review/Discharge Plan (OT)    Equipment Needs Upon Discharge (OT) shower chair  -MIGUEL     Anticipated Discharge Disposition (OT) home with home health  vs IRF pulmonary if slow progress  weaning from 02  -MIGUEL     Row Name 11/20/21 1120          Vital Signs    Pre Systolic BP Rehab 128  -MIGUEL     Pre Treatment Diastolic BP 76  -MIGUEL     Pretreatment Heart Rate (beats/min) 90  -MIGUEL     Posttreatment Heart Rate (beats/min) 75  -MIGUEL     Pre SpO2 (%) 93  -MIGUEL     O2 Delivery Pre Treatment nasal cannula  -MIGUEL     Intra SpO2 (%) 78  -MIGUEL     O2 Delivery Intra Treatment other (see comments)  02 NC accidentally undone a couple minutes when adding extension to reach BSC better, fast recovery from 78 to 90% once fixed  -MIGUEL     Post SpO2 (%) 92  -MIGUEL     O2 Delivery Post Treatment nasal cannula  -MIGUEL     Pre Patient Position Sitting  -MIGUEL     Intra Patient Position Standing  -MIGUEL     Post Patient Position Sitting  -MIGUEL     Row Name 11/20/21 1120          Positioning and Restraints    Pre-Treatment Position in bed  -MIGUEL     Post Treatment Position chair  -MIGUEL     In Chair reclined; call light within reach; encouraged to call for assist; legs elevated  pt. has been getting up ad mehdi to BS due to lasix, but educated pt. on need to call for assist when going bed to chair  -MIGUEL           User Key  (r) = Recorded By, (t) = Taken By, (c) = Cosigned By    Initials Name Provider Type    Ya Bailey, OT Occupational Therapist               Outcome Measures     Row Name 11/20/21 1129          How much help from another is currently needed...    Putting on and taking off regular lower body clothing? 4  -MIGUEL     Bathing (including washing, rinsing, and drying) 3  -MIGUEL     Toileting (which includes using toilet bed pan or urinal) 4  -MIGUEL     Putting on and taking off regular upper body clothing 4  -MIGUEL     Taking care of personal grooming (such as brushing teeth) 4  -MIGUEL     Eating meals 4  -MIGUEL     AM-PAC 6 Clicks Score (OT) 23  -MIGUEL     Row Name 11/20/21 1129          Functional Assessment    Outcome Measure Options AM-PAC 6 Clicks Daily Activity (OT)  -MIGUEL           User Key  (r) = Recorded By, (t) = Taken By, (c) = Cosigned By    Initials  Name Provider Type    Ya Bailey OT Occupational Therapist                Occupational Therapy Education                 Title: PT OT SLP Therapies (In Progress)     Topic: Occupational Therapy (In Progress)     Point: ADL training (Done)     Description:   Instruct learner(s) on proper safety adaptation and remediation techniques during self care or transfers.   Instruct in proper use of assistive devices.              Learning Progress Summary           Patient Acceptance, E,D, VU,NR by MIGUEL at 11/20/2021 2671    Comment: reason for consult, noted deficits, toilet aid use, need to call for assist bed to chair and chair to bed                   Point: Home exercise program (Not Started)     Description:   Instruct learner(s) on appropriate technique for monitoring, assisting and/or progressing therapeutic exercises/activities.              Learner Progress:  Not documented in this visit.          Point: Precautions (Not Started)     Description:   Instruct learner(s) on prescribed precautions during self-care and functional transfers.              Learner Progress:  Not documented in this visit.          Point: Body mechanics (Not Started)     Description:   Instruct learner(s) on proper positioning and spine alignment during self-care, functional mobility activities and/or exercises.              Learner Progress:  Not documented in this visit.                      User Key     Initials Effective Dates Name Provider Type Discipline    MIGUEL 06/16/21 -  Ya Mays OT Occupational Therapist OT              OT Recommendation and Plan  Planned Therapy Interventions (OT): activity tolerance training, BADL retraining, occupation/activity based interventions, patient/caregiver education/training, ROM/therapeutic exercise  Therapy Frequency (OT): daily  Plan of Care Review  Plan of Care Reviewed With: patient  Progress: no change  Outcome Summary: OT evaluation completed with pt. demonstrating impaired high  level balance without walker use and decreased activity tolerance impacting ADL indendence. Pt. issued toilet aid due to struggling with posterior wiping.  02 sat drop with mvmt to [facility] and back and up with ryan curtis. Skilled OT services appropriate for building endurance/actiivty tolerance and education on EC/WS techniques.  Recommend home with HH at discharge if pt. able to progress with lower 02 levels otherwise may need short term pulmonary rehab.     Time Calculation:    Time Calculation- OT     Row Name 11/20/21 1130             Time Calculation- OT    OT Start Time 1026  -MIGUEL      OT Received On 11/20/21  -MIGUEL      OT Goal Re-Cert Due Date 11/30/21  -MIGUEL              Timed Charges    07341 - OT Therapeutic Activity Minutes 8  -MIGUEL      52428 - OT Self Care/Mgmt Minutes 15  -MIGUEL              Untimed Charges    OT Eval/Re-eval Minutes 38  -MIGUEL              Total Minutes    Timed Charges Total Minutes 23  -MIGUEL      Untimed Charges Total Minutes 38  -MIGUEL       Total Minutes 61  -MIGUEL            User Key  (r) = Recorded By, (t) = Taken By, (c) = Cosigned By    Initials Name Provider Type    Ya Bailey OT Occupational Therapist              Therapy Charges for Today     Code Description Service Date Service Provider Modifiers Qty    26662579223 HC OT SELF CARE/MGMT/TRAIN EA 15 MIN 11/20/2021 Ya Mays OT GO 1    29184307349 HC OT THERAPEUTIC ACT EA 15 MIN 11/20/2021 Ya Mays OT GO 1    99710760186 HC OT EVAL LOW COMPLEXITY 3 11/20/2021 Ya Mays OT GO 1               Ya Mays OT  11/20/2021

## 2021-11-20 NOTE — PROGRESS NOTES
[facility] Medicine Services  PROGRESS NOTE    Patient Name: Jovanna Ching  : 1965  MRN: [id number]    Date of Admission: 2021  Primary Care Physician: Lulu Love APRN    Subjective   Subjective     CC: Follow-up SOA    HPI: Patient feels much better this morning, she did have some episodes of confusion last night pulling off her BiPAP mask,    ROS:  Gen- No fevers, chills  CV- No chest pain, palpitations  Resp- No cough, +dyspnea  GI- No N/V/D, abd pain    All other systems reviewed and are negative    Objective   Objective     Vital Signs:   Temp:  [98 °F (36.7 °C)-98.5 °F (36.9 °C)] 98.5 °F (36.9 °C)  Heart Rate:  [72-96] 84  Resp:  [16-18] 18  BP: (123-138)/(63-94) 128/76  Flow (L/min):  [5-15] 6     Physical Exam:  Constitutional: Chronically ill-appearing female, no acute distress, awake, alert  HENT: NCAT, mucous membranes moist  Respiratory: Diffuse coarse breath sounds, bibasilar crackles, expiratory wheezes on 5L NC  Cardiovascular: RRR, no murmurs, rubs, or gallops  Gastrointestinal: Positive bowel sounds, soft, nontender, nondistended  Musculoskeletal: No bilateral ankle edema  Psychiatric: Appropriate affect, cooperative  Neurologic: Oriented x 3, nonfocal   Skin: No rashes      Results Reviewed:  LAB RESULTS:      Lab 21  0406 21  1723   WBC 9.29 11.69*   HEMOGLOBIN 13.0 13.0   HEMATOCRIT 44.8 44.3   PLATELETS 355 376   NEUTROS ABS 8.40* 9.37*   IMMATURE GRANS (ABS) 0.06* 0.06*   LYMPHS ABS 0.57* 1.42   MONOS ABS 0.24 0.72   EOS ABS 0.00 0.07   MCV 94.5 93.5         Lab 21  0444 21  0406 21  1723   SODIUM 139 138 138   POTASSIUM 4.3 5.0 4.8   CHLORIDE 95* 96* 100   CO2 39.0* 35.0* 31.0*   ANION GAP 5.0 7.0 7.0   BUN 22* 16 18   CREATININE 0.51* 0.51* 0.58   GLUCOSE 195* 168* 160*   CALCIUM 8.9 9.0 8.9   HEMOGLOBIN A1C  --  7.30*  --          Lab 21  1723   TOTAL PROTEIN 7.3   ALBUMIN 3.80   GLOBULIN 3.5   ALT (SGPT) 35*    AST (SGOT) 27   BILIRUBIN 0.3   ALK PHOS 104         Lab 11/18/21  1723   PROBNP 610.6   TROPONIN T <0.010                 Lab 11/19/21  1734 11/19/21  0817 11/19/21  0338   PH, ARTERIAL 7.403 7.317* 7.321*   PCO2, ARTERIAL 65.0* 82.6* 78.0*   PO2 ART 65.0* 64.5* 180.0*   FIO2 40 40 75   HCO3 ART 40.6* 42.2* 40.2*   BASE EXCESS ART 12.9* 12.3* 10.8*   CARBOXYHEMOGLOBIN 1.4 1.3 1.3     Brief Urine Lab Results     None          Microbiology Results Abnormal     Procedure Component Value - Date/Time    COVID PRE-OP / PRE-PROCEDURE SCREENING ORDER (NO ISOLATION) - Swab, Nasopharynx [[id number]]  (Normal) Collected: 11/18/21 1724    Lab Status: Final result Specimen: Swab from Nasopharynx Updated: 11/18/21 1809    Narrative:      The following orders were created for panel order COVID PRE-OP / PRE-PROCEDURE SCREENING ORDER (NO ISOLATION) - Swab, Nasopharynx.  Procedure                               Abnormality         Status                     ---------                               -----------         ------                     COVID-19 and FLU A/B PCR...[[id number]]  Normal              Final result                 Please view results for these tests on the individual orders.    COVID-19 and FLU A/B PCR - Swab, Nasopharynx [[id number]]  (Normal) Collected: 11/18/21 1724    Lab Status: Final result Specimen: Swab from Nasopharynx Updated: 11/18/21 1809     COVID19 Not Detected     Influenza A PCR Not Detected     Influenza B PCR Not Detected    Narrative:      Fact sheet for providers: https://www.fda.gov/media/577586/download    Fact sheet for patients: https://www.fda.gov/media/467355/download    Test performed by PCR.          Adult Transthoracic Echo Complete w/ Color, Spectral and Contrast if necessary per protocol    Result Date: 11/19/2021  · Left ventricular ejection fraction appears to be 61 - 65%. Left ventricular systolic function is normal. · Left ventricular diastolic function was normal. · The right  ventricular cavity is mildly dilated. · Moderate tricuspid valve regurgitation is present. · Estimated right ventricular systolic pressure from tricuspid regurgitation is mildly elevated (35-45 mmHg). · Mild pulmonary hypertension is present.      XR Chest 1 View    Result Date: 11/18/2021  EXAMINATION: XR CHEST 1 VW - 11/18/2021  INDICATION: Shortness of air.  COMPARISON: 08/12/2021  FINDINGS: There has been interval enlargement of the heart, pulmonary vasculature and interval development of extensive pulmonary interstitial edema. There are small pleural effusions. No densely consolidated lung or pneumothorax is seen.      Impression: Interval development of cardiomegaly and congestive heart failure. Extensive bilateral pulmonary interstitial edema and small pleural effusions.  DICTATED:   11/18/2021 EDITED/lfs:   11/18/2021    This report was finalized on 11/18/2021 9:58 PM by Dr. Otilio Ramirez MD.      CT Angiogram Chest    Result Date: 11/18/2021  CTA Chest INDICATION: Respiratory failure and hypercapnia. Hypoxia. Heart failure. Shortness of air and back pain. TECHNIQUE: CT angiogram of the chest with IV contrast. 3-D reconstructions were obtained and reviewed.   Radiation dose reduction techniques included automated exposure control or exposure modulation based on body size. Count of known CT and cardiac nuc med studies performed in previous 12 months: 0. COMPARISON: 1/11/2009 FINDINGS: No pulmonary embolism or aortic dissection is identified. There are tiny bilateral pleural effusions. No pericardial effusion is seen. There is enlargement of the right thyroid lobe containing a hypodense nodule measuring at least 2.6 cm. This can be further evaluated with a nonemergent ultrasound as indicated. There is mediastinal and bilateral hilar adenopathy. For example, a right hilar node measures 1.4 cm. A left hilar node measures 1.2 cm. Right paratracheal node measures 1.5 cm. Several other mildly enlarged nodes are  present as well. These are nonspecific. No axillary adenopathy is seen. Upper abdominal images show mild left adrenal hyperplasia. Lung windows show COPD. Consolidations in the lower lobes are likely some compressive atelectasis. There is background groundglass opacity with some septal thickening, most likely the result of pulmonary edema. Imaging features are atypical or uncommonly reported for COVID-19 pneumonia. Alternative diagnoses should be considered. No pneumothorax is identified.     Impression: 1. No PE or aortic dissection. 2. Tiny bilateral pleural effusions with some adjacent lower lobe atelectasis. COPD is noted with background pulmonary edema. 3. Mediastinal and bilateral hilar adenopathy is nonspecific. Considerations would include reactive adenopathy, sarcoid, or even neoplasm such as lymphoma or metastatic disease. Attention on 3 month follow-up chest CT is recommended. 4. Right thyroid enlargement can be assessed with nonemergent ultrasound is needed. Signer Name: Charly Noland MD  Signed: 11/18/2021 11:03 PM  Workstation Name: [facility]  [facility]      Results for orders placed during the hospital encounter of 11/18/21    Adult Transthoracic Echo Complete w/ Color, Spectral and Contrast if necessary per protocol    Interpretation Summary  · Left ventricular ejection fraction appears to be 61 - 65%. Left ventricular systolic function is normal.  · Left ventricular diastolic function was normal.  · The right ventricular cavity is mildly dilated.  · Moderate tricuspid valve regurgitation is present.  · Estimated right ventricular systolic pressure from tricuspid regurgitation is mildly elevated (35-45 mmHg).  · Mild pulmonary hypertension is present.      I have reviewed the medications:  Scheduled Meds:enoxaparin, 40 mg, Subcutaneous, Q12H  insulin lispro, 0-7 Units, Subcutaneous, TID AC  ipratropium-albuterol, 3 mL, Nebulization, 4x Daily - RT  lisinopril, 20 mg, Oral,  Q24H  methylPREDNISolone sodium succinate, 60 mg, Intravenous, Q8H  nicotine, 1 patch, Transdermal, Q24H  pharmacy consult - MTM, , Does not apply, Daily  sodium chloride, 10 mL, Intravenous, Q12H      Continuous Infusions:   PRN Meds:.•  acetaminophen **OR** acetaminophen **OR** acetaminophen  •  albuterol  •  dextrose  •  dextrose  •  glucagon (human recombinant)  •  ipratropium-albuterol  •  sodium chloride  •  sodium chloride    Assessment/Plan   Assessment & Plan     Active Hospital Problems    Diagnosis  POA   • **Acute respiratory failure with hypoxia and hypercapnia (HCC) [J96.01, J96.02]  Yes   • Acute pulmonary edema (HCC) [J81.0]  Yes   • COPD with acute exacerbation (HCC) [J44.1]  Yes   • Tobacco abuse [Z72.0]  Yes   • Essential hypertension [I10]  Yes   • Type 2 diabetes mellitus (HCC) [E11.9]  Yes      Resolved Hospital Problems   No resolved problems to display.        Brief Hospital Course to date:  Jovanna Ching is a 56 y.o. female with history of COPD with ongoing tobacco abuse 1 to 2 L NC,, type 2 diabetes, hypertension.  She presented to the ED with progressively worsening SOA     Acute respiratory failure with hypoxia and hypercapnia  COPD with exacerbation with ongoing tobacco abuse  Acute pulmonary edema  -Patient with O2 sats of 77% on presentation ABG with PCO2 81.5, initially 100% nonrebreather transition to BiPAP, currently on 4 L NC, she is on a baseline of 2-2.5 L at home.  She reports possible malfunction with her portable O2  -CTA chest is negative for PE, shows small bilateral effusions, COPD and pulmonary edema.  -  -Echo with EF 60-65%, mild pulmonary hypertension  -Continue duo nebs, diuresis with 40 mg of IV Lasix ,IV Solu-Medrol  -Continue NRT  -Follow-up records from [facility] patient did have a recent C  -Patient will benefit from a sleep study she may need home CPAP     Hypertension  -BP is much improved, stable, continue lisinopril with holding parameters, continue to  monitor     Well-controlled type 2 diabetes with A1c 7.3%  -FSBG have been reviewed and currently suboptimal, likely secondary to steroids  -We will start Levemir 5 units twice daily, prandial Humalog 3 units 3 times daily and continue SSI, continue to titrate as indicated     Mediastinal and bilateral hilar adenopathy  -Need follow-up CT in 3 months     Right hilar enlargement  -Follow-up thyroid ultrasound as outpatient     Morbid obesity-with BMI of 44, complicates all aspects of care.      DVT prophylaxis:  Medical and mechanical DVT prophylaxis orders are present.     AM-PAC 6 Clicks Score (PT): 23 (11/19/21 2000)    Disposition: TBD    CODE STATUS:   Code Status and Medical Interventions:   Ordered at: 11/18/21 2539     Level Of Support Discussed With:    Patient     Code Status (Patient has no pulse and is not breathing):    CPR (Attempt to Resuscitate)     Medical Interventions (Patient has pulse or is breathing):    Full Support       Faina Damon MD  11/20/21

## 2021-11-21 LAB
GLUCOSE BLDC GLUCOMTR-MCNC: 198 MG/DL (ref 70–130)
GLUCOSE BLDC GLUCOMTR-MCNC: 266 MG/DL (ref 70–130)
GLUCOSE BLDC GLUCOMTR-MCNC: 270 MG/DL (ref 70–130)
GLUCOSE BLDC GLUCOMTR-MCNC: 345 MG/DL (ref 70–130)

## 2021-11-21 PROCEDURE — 94799 UNLISTED PULMONARY SVC/PX: CPT

## 2021-11-21 PROCEDURE — 82962 GLUCOSE BLOOD TEST: CPT

## 2021-11-21 PROCEDURE — 25010000002 METHYLPREDNISOLONE PER 125 MG: Performed by: NURSE PRACTITIONER

## 2021-11-21 PROCEDURE — 63710000001 INSULIN LISPRO (HUMAN) PER 5 UNITS: Performed by: NURSE PRACTITIONER

## 2021-11-21 PROCEDURE — 99232 SBSQ HOSP IP/OBS MODERATE 35: CPT | Performed by: INTERNAL MEDICINE

## 2021-11-21 PROCEDURE — 63710000001 INSULIN DETEMIR PER 5 UNITS: Performed by: INTERNAL MEDICINE

## 2021-11-21 PROCEDURE — 25010000002 ENOXAPARIN PER 10 MG: Performed by: NURSE PRACTITIONER

## 2021-11-21 PROCEDURE — 63710000001 INSULIN LISPRO (HUMAN) PER 5 UNITS: Performed by: INTERNAL MEDICINE

## 2021-11-21 PROCEDURE — 94660 CPAP INITIATION&MGMT: CPT

## 2021-11-21 PROCEDURE — 25010000002 FUROSEMIDE PER 20 MG: Performed by: INTERNAL MEDICINE

## 2021-11-21 PROCEDURE — 63710000001 PREDNISONE PER 1 MG: Performed by: INTERNAL MEDICINE

## 2021-11-21 RX ORDER — PREDNISONE 20 MG/1
40 TABLET ORAL
Status: DISCONTINUED | OUTPATIENT
Start: 2021-11-21 | End: 2021-11-23 | Stop reason: HOSPADM

## 2021-11-21 RX ADMIN — INSULIN LISPRO 4 UNITS: 100 INJECTION, SOLUTION INTRAVENOUS; SUBCUTANEOUS at 17:57

## 2021-11-21 RX ADMIN — INSULIN DETEMIR 10 UNITS: 100 INJECTION, SOLUTION SUBCUTANEOUS at 21:06

## 2021-11-21 RX ADMIN — BENZONATATE 100 MG: 100 CAPSULE ORAL at 11:42

## 2021-11-21 RX ADMIN — INSULIN LISPRO 4 UNITS: 100 INJECTION, SOLUTION INTRAVENOUS; SUBCUTANEOUS at 11:43

## 2021-11-21 RX ADMIN — ENOXAPARIN SODIUM 40 MG: 40 INJECTION SUBCUTANEOUS at 21:05

## 2021-11-21 RX ADMIN — IPRATROPIUM BROMIDE AND ALBUTEROL SULFATE 3 ML: 2.5; .5 SOLUTION RESPIRATORY (INHALATION) at 07:34

## 2021-11-21 RX ADMIN — ACETAMINOPHEN 650 MG: 325 TABLET, FILM COATED ORAL at 21:06

## 2021-11-21 RX ADMIN — ACETAMINOPHEN 650 MG: 325 TABLET, FILM COATED ORAL at 11:42

## 2021-11-21 RX ADMIN — FUROSEMIDE 40 MG: 40 INJECTION, SOLUTION INTRAMUSCULAR; INTRAVENOUS at 08:41

## 2021-11-21 RX ADMIN — NICOTINE 1 PATCH: 14 PATCH, EXTENDED RELEASE TRANSDERMAL at 05:19

## 2021-11-21 RX ADMIN — SODIUM CHLORIDE, PRESERVATIVE FREE 10 ML: 5 INJECTION INTRAVENOUS at 08:49

## 2021-11-21 RX ADMIN — INSULIN LISPRO 3 UNITS: 100 INJECTION, SOLUTION INTRAVENOUS; SUBCUTANEOUS at 08:42

## 2021-11-21 RX ADMIN — INSULIN DETEMIR 10 UNITS: 100 INJECTION, SOLUTION SUBCUTANEOUS at 09:00

## 2021-11-21 RX ADMIN — ENOXAPARIN SODIUM 40 MG: 40 INJECTION SUBCUTANEOUS at 08:40

## 2021-11-21 RX ADMIN — PREDNISONE 40 MG: 20 TABLET ORAL at 11:44

## 2021-11-21 RX ADMIN — IPRATROPIUM BROMIDE AND ALBUTEROL SULFATE 3 ML: 2.5; .5 SOLUTION RESPIRATORY (INHALATION) at 19:42

## 2021-11-21 RX ADMIN — INSULIN LISPRO 3 UNITS: 100 INJECTION, SOLUTION INTRAVENOUS; SUBCUTANEOUS at 17:56

## 2021-11-21 RX ADMIN — IPRATROPIUM BROMIDE AND ALBUTEROL SULFATE 3 ML: 2.5; .5 SOLUTION RESPIRATORY (INHALATION) at 11:12

## 2021-11-21 RX ADMIN — INSULIN LISPRO 3 UNITS: 100 INJECTION, SOLUTION INTRAVENOUS; SUBCUTANEOUS at 11:43

## 2021-11-21 RX ADMIN — METHYLPREDNISOLONE SODIUM SUCCINATE 60 MG: 125 INJECTION, POWDER, FOR SOLUTION INTRAMUSCULAR; INTRAVENOUS at 05:19

## 2021-11-21 RX ADMIN — LISINOPRIL 20 MG: 20 TABLET ORAL at 08:39

## 2021-11-21 RX ADMIN — IPRATROPIUM BROMIDE AND ALBUTEROL SULFATE 3 ML: 2.5; .5 SOLUTION RESPIRATORY (INHALATION) at 14:48

## 2021-11-21 RX ADMIN — INSULIN LISPRO 2 UNITS: 100 INJECTION, SOLUTION INTRAVENOUS; SUBCUTANEOUS at 08:41

## 2021-11-21 NOTE — PROGRESS NOTES
[facility] Medicine Services  PROGRESS NOTE    Patient Name: Jovanna Ching  : 1965  MRN: [id number]    Date of Admission: 2021  Primary Care Physician: Lulu Love APRN    Subjective   Subjective     CC: Follow-up SOA    HPI: No acute events overnight, patient rested well, was able to use her BiPAP overnight.  Mentions that her breathing is much better.    ROS:  Gen- No fevers, chills  CV- No chest pain, palpitations  Resp- No cough, dyspnea  GI- No N/V/D, abd pain    All other systems reviewed and are negative    Objective   Objective     Vital Signs:   Temp:  [98.3 °F (36.8 °C)-99 °F (37.2 °C)] 98.4 °F (36.9 °C)  Heart Rate:  [] 72  Resp:  [18-20] 18  BP: (117-134)/(66-98) 123/79  Flow (L/min):  [5-6] 5     Physical Exam:  Constitutional: No acute distress, awake, alert  HENT: NCAT, mucous membranes moist  Respiratory: Nonlabored respirations, diffuse coarse breath sounds on 4 L NC  Cardiovascular: RRR, no murmurs, rubs, or gallops  Gastrointestinal: Positive bowel sounds, soft, nontender, nondistended  Musculoskeletal: No bilateral ankle edema  Psychiatric: Appropriate affect, cooperative  Neurologic: Oriented x 3, nonfocal  Skin: No rashes      Results Reviewed:  LAB RESULTS:      Lab 21  0406 21  1723   WBC 9.29 11.69*   HEMOGLOBIN 13.0 13.0   HEMATOCRIT 44.8 44.3   PLATELETS 355 376   NEUTROS ABS 8.40* 9.37*   IMMATURE GRANS (ABS) 0.06* 0.06*   LYMPHS ABS 0.57* 1.42   MONOS ABS 0.24 0.72   EOS ABS 0.00 0.07   MCV 94.5 93.5         Lab 21  0444 21  0406 21  1723   SODIUM 139 138 138   POTASSIUM 4.3 5.0 4.8   CHLORIDE 95* 96* 100   CO2 39.0* 35.0* 31.0*   ANION GAP 5.0 7.0 7.0   BUN 22* 16 18   CREATININE 0.51* 0.51* 0.58   GLUCOSE 195* 168* 160*   CALCIUM 8.9 9.0 8.9   HEMOGLOBIN A1C  --  7.30*  --          Lab 21  1723   TOTAL PROTEIN 7.3   ALBUMIN 3.80   GLOBULIN 3.5   ALT (SGPT) 35*   AST (SGOT) 27   BILIRUBIN 0.3   ALK PHOS  104         Lab 11/18/21  1723   PROBNP 610.6   TROPONIN T <0.010                 Lab 11/19/21  1734 11/19/21  0817 11/19/21  0338   PH, ARTERIAL 7.403 7.317* 7.321*   PCO2, ARTERIAL 65.0* 82.6* 78.0*   PO2 ART 65.0* 64.5* 180.0*   FIO2 40 40 75   HCO3 ART 40.6* 42.2* 40.2*   BASE EXCESS ART 12.9* 12.3* 10.8*   CARBOXYHEMOGLOBIN 1.4 1.3 1.3     Brief Urine Lab Results     None          Microbiology Results Abnormal     Procedure Component Value - Date/Time    COVID PRE-OP / PRE-PROCEDURE SCREENING ORDER (NO ISOLATION) - Swab, Nasopharynx [[id number]]  (Normal) Collected: 11/18/21 1724    Lab Status: Final result Specimen: Swab from Nasopharynx Updated: 11/18/21 1809    Narrative:      The following orders were created for panel order COVID PRE-OP / PRE-PROCEDURE SCREENING ORDER (NO ISOLATION) - Swab, Nasopharynx.  Procedure                               Abnormality         Status                     ---------                               -----------         ------                     COVID-19 and FLU A/B PCR...[[id number]]  Normal              Final result                 Please view results for these tests on the individual orders.    COVID-19 and FLU A/B PCR - Swab, Nasopharynx [[id number]]  (Normal) Collected: 11/18/21 1724    Lab Status: Final result Specimen: Swab from Nasopharynx Updated: 11/18/21 1809     COVID19 Not Detected     Influenza A PCR Not Detected     Influenza B PCR Not Detected    Narrative:      Fact sheet for providers: https://www.fda.gov/media/902897/download    Fact sheet for patients: https://www.fda.gov/media/690522/download    Test performed by PCR.          Adult Transthoracic Echo Complete w/ Color, Spectral and Contrast if necessary per protocol    Result Date: 11/19/2021  · Left ventricular ejection fraction appears to be 61 - 65%. Left ventricular systolic function is normal. · Left ventricular diastolic function was normal. · The right ventricular cavity is mildly dilated. · Moderate  tricuspid valve regurgitation is present. · Estimated right ventricular systolic pressure from tricuspid regurgitation is mildly elevated (35-45 mmHg). · Mild pulmonary hypertension is present.        Results for orders placed during the hospital encounter of 11/18/21    Adult Transthoracic Echo Complete w/ Color, Spectral and Contrast if necessary per protocol    Interpretation Summary  · Left ventricular ejection fraction appears to be 61 - 65%. Left ventricular systolic function is normal.  · Left ventricular diastolic function was normal.  · The right ventricular cavity is mildly dilated.  · Moderate tricuspid valve regurgitation is present.  · Estimated right ventricular systolic pressure from tricuspid regurgitation is mildly elevated (35-45 mmHg).  · Mild pulmonary hypertension is present.      I have reviewed the medications:  Scheduled Meds:enoxaparin, 40 mg, Subcutaneous, Q12H  furosemide, 40 mg, Intravenous, Daily  insulin detemir, 5 Units, Subcutaneous, Q12H  insulin lispro, 0-7 Units, Subcutaneous, TID AC  insulin lispro, 3 Units, Subcutaneous, TID With Meals  ipratropium-albuterol, 3 mL, Nebulization, 4x Daily - RT  lisinopril, 20 mg, Oral, Q24H  methylPREDNISolone sodium succinate, 60 mg, Intravenous, Q8H  nicotine, 1 patch, Transdermal, Q24H  pharmacy consult - MTM, , Does not apply, Daily  sodium chloride, 10 mL, Intravenous, Q12H      Continuous Infusions:   PRN Meds:.•  acetaminophen **OR** acetaminophen **OR** acetaminophen  •  albuterol  •  benzonatate  •  dextrose  •  dextrose  •  glucagon (human recombinant)  •  ipratropium-albuterol  •  sodium chloride  •  sodium chloride    Assessment/Plan   Assessment & Plan     Active Hospital Problems    Diagnosis  POA   • **Acute respiratory failure with hypoxia and hypercapnia (HCC) [J96.01, J96.02]  Yes   • Acute pulmonary edema (HCC) [J81.0]  Yes   • COPD with acute exacerbation (HCC) [J44.1]  Yes   • Tobacco abuse [Z72.0]  Yes   • Essential  hypertension [I10]  Yes   • Type 2 diabetes mellitus (HCC) [E11.9]  Yes      Resolved Hospital Problems   No resolved problems to display.        Brief Hospital Course to date:  Jovanna Ching is a 56 y.o. female with history of COPD with ongoing tobacco abuse 1 to 2 L NC,, type 2 diabetes, hypertension.  She presented to the ED with progressively worsening SOA     Acute respiratory failure with hypoxia and hypercapnia  COPD with exacerbation with ongoing tobacco abuse  Acute pulmonary edema  -Patient with O2 sats of 77% on presentation ABG with PCO2 81.5, initially 100% nonrebreather transition to BiPAP, currently on 4 L NC, she is on a baseline of 2-2.5 L at home.  Please maintain O2 sats between 88-92%.  -CTA chest is negative for PE, shows small bilateral effusions, COPD and pulmonary edema.  -  -Echo with EF 60-65%, mild pulmonary hypertension  -Continue duo nebs, diuresis with 40 mg of IV Lasix ,, switch IV Solu-Medrol to prednisone  -Continue NRT  -Patient will benefit from a sleep study as she may need home CPAP  - she reports possible malfunction with her portable home O2, CM to follow     Hypertension  -BP is currently stable, continue home lisinopril     Well-controlled type 2 diabetes with A1c 7.3%  -FSBG have been reviewed and remain suboptimal, likely secondary to steroids  -Increase  Levemir 10 units BID, continue prandial Humalog and SSI, titrate as indicated     Mediastinal and bilateral hilar adenopathy  -Need follow-up CT in 3 months     Right hilar enlargement  -Follow-up thyroid ultrasound as outpatient     Morbid obesity-with BMI of 44, complicates all aspects of care.    DVT prophylaxis:  Medical and mechanical DVT prophylaxis orders are present.     AM-PAC 6 Clicks Score (PT): 24 (11/20/21 0388)    Disposition: TBD, anticipate discharge home in a.m. once home O2 issues have been arranged, CM following    CODE STATUS:   Code Status and Medical Interventions:   Ordered at: 11/18/21 5604     Level  Of Support Discussed With:    Patient     Code Status (Patient has no pulse and is not breathing):    CPR (Attempt to Resuscitate)     Medical Interventions (Patient has pulse or is breathing):    Full Support       Faina Damon MD  11/21/21

## 2021-11-22 LAB
GLUCOSE BLDC GLUCOMTR-MCNC: 146 MG/DL (ref 70–130)
GLUCOSE BLDC GLUCOMTR-MCNC: 239 MG/DL (ref 70–130)
GLUCOSE BLDC GLUCOMTR-MCNC: 341 MG/DL (ref 70–130)
GLUCOSE BLDC GLUCOMTR-MCNC: 92 MG/DL (ref 70–130)

## 2021-11-22 PROCEDURE — 25010000002 FUROSEMIDE PER 20 MG: Performed by: INTERNAL MEDICINE

## 2021-11-22 PROCEDURE — 25010000002 ENOXAPARIN PER 10 MG: Performed by: NURSE PRACTITIONER

## 2021-11-22 PROCEDURE — 94799 UNLISTED PULMONARY SVC/PX: CPT

## 2021-11-22 PROCEDURE — 63710000001 INSULIN DETEMIR PER 5 UNITS: Performed by: INTERNAL MEDICINE

## 2021-11-22 PROCEDURE — 63710000001 INSULIN LISPRO (HUMAN) PER 5 UNITS: Performed by: INTERNAL MEDICINE

## 2021-11-22 PROCEDURE — 63710000001 INSULIN LISPRO (HUMAN) PER 5 UNITS: Performed by: NURSE PRACTITIONER

## 2021-11-22 PROCEDURE — 94660 CPAP INITIATION&MGMT: CPT

## 2021-11-22 PROCEDURE — 99232 SBSQ HOSP IP/OBS MODERATE 35: CPT | Performed by: NURSE PRACTITIONER

## 2021-11-22 PROCEDURE — 63710000001 PREDNISONE PER 1 MG: Performed by: INTERNAL MEDICINE

## 2021-11-22 PROCEDURE — 82962 GLUCOSE BLOOD TEST: CPT

## 2021-11-22 RX ORDER — METOLAZONE 5 MG/1
5 TABLET ORAL ONCE
Status: COMPLETED | OUTPATIENT
Start: 2021-11-22 | End: 2021-11-22

## 2021-11-22 RX ORDER — FUROSEMIDE 10 MG/ML
60 INJECTION INTRAMUSCULAR; INTRAVENOUS EVERY 12 HOURS
Status: DISCONTINUED | OUTPATIENT
Start: 2021-11-22 | End: 2021-11-23 | Stop reason: HOSPADM

## 2021-11-22 RX ORDER — TRAZODONE HYDROCHLORIDE 50 MG/1
50 TABLET ORAL NIGHTLY
Status: DISCONTINUED | OUTPATIENT
Start: 2021-11-22 | End: 2021-11-23 | Stop reason: HOSPADM

## 2021-11-22 RX ADMIN — ENOXAPARIN SODIUM 40 MG: 40 INJECTION SUBCUTANEOUS at 20:36

## 2021-11-22 RX ADMIN — FUROSEMIDE 60 MG: 10 INJECTION INTRAMUSCULAR; INTRAVENOUS at 08:16

## 2021-11-22 RX ADMIN — INSULIN DETEMIR 20 UNITS: 100 INJECTION, SOLUTION SUBCUTANEOUS at 20:38

## 2021-11-22 RX ADMIN — INSULIN LISPRO 5 UNITS: 100 INJECTION, SOLUTION INTRAVENOUS; SUBCUTANEOUS at 16:46

## 2021-11-22 RX ADMIN — INSULIN DETEMIR 20 UNITS: 100 INJECTION, SOLUTION SUBCUTANEOUS at 08:17

## 2021-11-22 RX ADMIN — NICOTINE 1 PATCH: 14 PATCH, EXTENDED RELEASE TRANSDERMAL at 05:56

## 2021-11-22 RX ADMIN — IPRATROPIUM BROMIDE AND ALBUTEROL SULFATE 3 ML: 2.5; .5 SOLUTION RESPIRATORY (INHALATION) at 19:19

## 2021-11-22 RX ADMIN — FUROSEMIDE 60 MG: 10 INJECTION INTRAMUSCULAR; INTRAVENOUS at 20:36

## 2021-11-22 RX ADMIN — IPRATROPIUM BROMIDE AND ALBUTEROL SULFATE 3 ML: 2.5; .5 SOLUTION RESPIRATORY (INHALATION) at 12:26

## 2021-11-22 RX ADMIN — IPRATROPIUM BROMIDE AND ALBUTEROL SULFATE 3 ML: 2.5; .5 SOLUTION RESPIRATORY (INHALATION) at 08:14

## 2021-11-22 RX ADMIN — METOLAZONE 5 MG: 5 TABLET ORAL at 08:16

## 2021-11-22 RX ADMIN — ACETAMINOPHEN 650 MG: 325 TABLET, FILM COATED ORAL at 20:36

## 2021-11-22 RX ADMIN — LISINOPRIL 20 MG: 20 TABLET ORAL at 08:17

## 2021-11-22 RX ADMIN — INSULIN LISPRO 3 UNITS: 100 INJECTION, SOLUTION INTRAVENOUS; SUBCUTANEOUS at 08:17

## 2021-11-22 RX ADMIN — ENOXAPARIN SODIUM 40 MG: 40 INJECTION SUBCUTANEOUS at 08:16

## 2021-11-22 RX ADMIN — PREDNISONE 40 MG: 20 TABLET ORAL at 08:17

## 2021-11-22 RX ADMIN — INSULIN LISPRO 3 UNITS: 100 INJECTION, SOLUTION INTRAVENOUS; SUBCUTANEOUS at 16:46

## 2021-11-22 RX ADMIN — IPRATROPIUM BROMIDE AND ALBUTEROL SULFATE 3 ML: 2.5; .5 SOLUTION RESPIRATORY (INHALATION) at 16:00

## 2021-11-22 RX ADMIN — INSULIN LISPRO 5 UNITS: 100 INJECTION, SOLUTION INTRAVENOUS; SUBCUTANEOUS at 08:20

## 2021-11-22 RX ADMIN — INSULIN LISPRO 5 UNITS: 100 INJECTION, SOLUTION INTRAVENOUS; SUBCUTANEOUS at 12:07

## 2021-11-22 RX ADMIN — SODIUM CHLORIDE, PRESERVATIVE FREE 10 ML: 5 INJECTION INTRAVENOUS at 08:21

## 2021-11-22 RX ADMIN — SODIUM CHLORIDE, PRESERVATIVE FREE 10 ML: 5 INJECTION INTRAVENOUS at 20:37

## 2021-11-22 RX ADMIN — TRAZODONE HYDROCHLORIDE 50 MG: 50 TABLET ORAL at 20:37

## 2021-11-22 NOTE — CASE MANAGEMENT/SOCIAL WORK
Continued Stay Note  [facility]     Patient Name: Jovanna Ching  MRN: [id number]  Today's Date: 11/22/2021    Admit Date: 11/18/2021     Discharge Plan     Row Name 11/22/21 0951       Plan    Plan Home    Patient/Family in Agreement with Plan yes    Plan Comments Plan is D/C to home tomorrow per pt's conversation with doctor this morning. I met with Ms. Ching at the bedside. She states she wears Oxygen 24/7 at home, 2LNC. She states she has 5 oxygen bottles, 2 are filled, 3 empty. She would like a small portable tank if possible. Pt states she also has an oxygen machine that plugs into wall at home. I spoke with [facility], they supply pt's O2. Jacy states that pt will need to talk with her PCP to have them order smaller portable tank. [facility] will bring pt a tank today to her room for transportation home. [facility] states they will be here within 2 hrs with tank. Jacy also states that pt will just need to call [employer] to request refill bottles once pt is down to her last tank. I explained all of this to the pt. She is agreeable. Pt states family will transport her home. No other needs voiced or identified. CM will continue to follow.    Final Discharge Disposition Code 01 - home or self-care               Discharge Codes    No documentation.                     Amanda Dobbins RN

## 2021-11-22 NOTE — PROGRESS NOTES
[facility] Medicine Services  PROGRESS NOTE    Patient Name: Jovanna Ching  : 1965  MRN: [id number]    Date of Admission: 2021  Primary Care Physician: Lulu Love APRN    Subjective   Subjective     CC: Follow-up SOA    HPI:   Sitting up in bed working on [employer]. Currently on 4LNC, but states she feels like she is breathing okay.     ROS:  Gen- No fevers, chills  CV- No chest pain, palpitations  Resp- + cough and mild dyspnea  GI- No N/V/D, abd pain     All other systems reviewed and are negative    Objective   Objective     Vital Signs:   Temp:  [97.6 °F (36.4 °C)-98.8 °F (37.1 °C)] 98.1 °F (36.7 °C)  Heart Rate:  [] 80  Resp:  [18-21] 20  BP: (115-143)/(68-88) 129/88  Flow (L/min):  [4-6] 4     Physical Exam:  Constitutional: No acute distress, awake, alert  HENT: NCAT, mucous membranes moist  Respiratory: Bibasilar rales, respiratory effort normal on 4LNC  Cardiovascular: RRR, no murmurs, rubs, or gallops  Gastrointestinal: Positive bowel sounds, soft, nontender, nondistended, obese  Musculoskeletal: 1+ bilateral ankle edema  Psychiatric: Appropriate affect, cooperative  Neurologic: Oriented x 3, CAGE , speech clear  Skin: No rashes     Results Reviewed:  LAB RESULTS:      Lab 21  0406 21  1723   WBC 9.29 11.69*   HEMOGLOBIN 13.0 13.0   HEMATOCRIT 44.8 44.3   PLATELETS 355 376   NEUTROS ABS 8.40* 9.37*   IMMATURE GRANS (ABS) 0.06* 0.06*   LYMPHS ABS 0.57* 1.42   MONOS ABS 0.24 0.72   EOS ABS 0.00 0.07   MCV 94.5 93.5         Lab 21  0444 21  0406 21  1723   SODIUM 139 138 138   POTASSIUM 4.3 5.0 4.8   CHLORIDE 95* 96* 100   CO2 39.0* 35.0* 31.0*   ANION GAP 5.0 7.0 7.0   BUN 22* 16 18   CREATININE 0.51* 0.51* 0.58   GLUCOSE 195* 168* 160*   CALCIUM 8.9 9.0 8.9   HEMOGLOBIN A1C  --  7.30*  --          Lab 21  1723   TOTAL PROTEIN 7.3   ALBUMIN 3.80   GLOBULIN 3.5   ALT (SGPT) 35*   AST (SGOT) 27   BILIRUBIN 0.3   ALK PHOS  104         Lab 11/18/21  1723   PROBNP 610.6   TROPONIN T <0.010                 Lab 11/19/21  1734 11/19/21  0817 11/19/21  0338   PH, ARTERIAL 7.403 7.317* 7.321*   PCO2, ARTERIAL 65.0* 82.6* 78.0*   PO2 ART 65.0* 64.5* 180.0*   FIO2 40 40 75   HCO3 ART 40.6* 42.2* 40.2*   BASE EXCESS ART 12.9* 12.3* 10.8*   CARBOXYHEMOGLOBIN 1.4 1.3 1.3     Brief Urine Lab Results     None          Microbiology Results Abnormal     Procedure Component Value - Date/Time    COVID PRE-OP / PRE-PROCEDURE SCREENING ORDER (NO ISOLATION) - Swab, Nasopharynx [[id number]]  (Normal) Collected: 11/18/21 1724    Lab Status: Final result Specimen: Swab from Nasopharynx Updated: 11/18/21 1809    Narrative:      The following orders were created for panel order COVID PRE-OP / PRE-PROCEDURE SCREENING ORDER (NO ISOLATION) - Swab, Nasopharynx.  Procedure                               Abnormality         Status                     ---------                               -----------         ------                     COVID-19 and FLU A/B PCR...[[id number]]  Normal              Final result                 Please view results for these tests on the individual orders.    COVID-19 and FLU A/B PCR - Swab, Nasopharynx [[id number]]  (Normal) Collected: 11/18/21 1724    Lab Status: Final result Specimen: Swab from Nasopharynx Updated: 11/18/21 1809     COVID19 Not Detected     Influenza A PCR Not Detected     Influenza B PCR Not Detected    Narrative:      Fact sheet for providers: https://www.fda.gov/media/762982/download    Fact sheet for patients: https://www.fda.gov/media/334070/download    Test performed by PCR.          No radiology results from the last 24 hrs    Results for orders placed during the hospital encounter of 11/18/21    Adult Transthoracic Echo Complete w/ Color, Spectral and Contrast if necessary per protocol    Interpretation Summary  · Left ventricular ejection fraction appears to be 61 - 65%. Left ventricular systolic function is  normal.  · Left ventricular diastolic function was normal.  · The right ventricular cavity is mildly dilated.  · Moderate tricuspid valve regurgitation is present.  · Estimated right ventricular systolic pressure from tricuspid regurgitation is mildly elevated (35-45 mmHg).  · Mild pulmonary hypertension is present.      I have reviewed the medications:  Scheduled Meds:enoxaparin, 40 mg, Subcutaneous, Q12H  furosemide, 60 mg, Intravenous, Q12H  insulin detemir, 20 Units, Subcutaneous, Q12H  insulin lispro, 0-7 Units, Subcutaneous, TID AC  insulin lispro, 5 Units, Subcutaneous, TID With Meals  ipratropium-albuterol, 3 mL, Nebulization, 4x Daily - RT  lisinopril, 20 mg, Oral, Q24H  nicotine, 1 patch, Transdermal, Q24H  pharmacy consult - MTM, , Does not apply, Daily  predniSONE, 40 mg, Oral, Daily With Breakfast  sodium chloride, 10 mL, Intravenous, Q12H  traZODone, 50 mg, Oral, Nightly      Continuous Infusions:   PRN Meds:.•  acetaminophen **OR** acetaminophen **OR** acetaminophen  •  albuterol  •  benzonatate  •  dextrose  •  dextrose  •  glucagon (human recombinant)  •  ipratropium-albuterol  •  sodium chloride  •  sodium chloride    Assessment/Plan   Assessment & Plan     Active Hospital Problems    Diagnosis  POA   • **Acute respiratory failure with hypoxia and hypercapnia (HCC) [J96.01, J96.02]  Yes   • Acute pulmonary edema (HCC) [J81.0]  Yes   • COPD with acute exacerbation (HCC) [J44.1]  Yes   • Tobacco abuse [Z72.0]  Yes   • Essential hypertension [I10]  Yes   • Type 2 diabetes mellitus (HCC) [E11.9]  Yes      Resolved Hospital Problems   No resolved problems to display.        Brief Hospital Course to date:  Jovanna Ching is a 56 y.o. female with history of COPD with ongoing tobacco abuse 1 to 2 L NC,, type 2 diabetes, hypertension.  She presented to the ED with progressively worsening SOA     Acute respiratory failure with hypoxia and hypercapnia  COPD with exacerbation with ongoing tobacco abuse  Acute  pulmonary edema  -Patient with O2 sats of 77% on presentation ABG with PCO2 81.5, initially 100% nonrebreather transition to BiPAP, currently on 4 L NC, she is on a baseline of 2-2.5 L at home.  Please maintain O2 sats between 88-92%.  -CTA chest is negative for PE, shows small bilateral effusions, COPD and pulmonary edema.  -  -Echo with EF 60-65%, mild pulmonary hypertension  -Continue duo nebs, diuresis with 40 mg of IV Lasix, switch IV Solu-Medrol to prednisone  -Continue NRT  -Patient will benefit from a sleep study as she may need home CPAP  - she reports possible malfunction with her portable home O2, CM to follow  --has had increased oxygenation needs overnight, currently using 5LNC, treating with more IV Lasix today in addition to Metolazone and will monitor I/O     Hypertension  -BP is currently stable, continue home lisinopril     Well-controlled type 2 diabetes with A1c 7.3%  -FSBG have been reviewed and remain suboptimal, likely secondary to steroids  -Increase  Levemir 10 units BID, continue prandial Humalog and SSI, titrate as indicated     Mediastinal and bilateral hilar adenopathy  -Need follow-up CT in 3 months     Right hilar enlargement  -Follow-up thyroid ultrasound as outpatient     Morbid obesity-with BMI of 44, complicates all aspects of care.    DVT prophylaxis:  Medical and mechanical DVT prophylaxis orders are present.     AM-PAC 6 Clicks Score (PT): 22 (11/22/21 1930)    Disposition: TBD, likely home in 1-2 days if able to wean oxygen down to < 3LNC     CODE STATUS:   Code Status and Medical Interventions:   Ordered at: 11/18/21 3299     Level Of Support Discussed With:    Patient     Code Status (Patient has no pulse and is not breathing):    CPR (Attempt to Resuscitate)     Medical Interventions (Patient has pulse or is breathing):    Full Support       Ame Paz, APRN  11/22/21

## 2021-11-22 NOTE — PROGRESS NOTES
NN spoke with pt at BS.  Pt alert and able to answer questions appropriately. Pt O2 sat  91% on 5  L currently, home O2 use 2.5L. COPD action plan reviewed. Deep breathing exercises encouraged. Patient informed of upcoming pulmonary clinic appointment date and time.  No new concerns or questions voiced at this time.  NN will continue to follow as needed.       Per current GOLD Standards, please consider: No ICS in place, Outpatient PFT, Pulmonary rehab as appropriate, NRT at DC        Patient reports that she has called QUIT Line and is unable to get assistance with her quit attempt.  NN will investigate further what the issue might be as the patient reports a strong motivation and intent to quit.     Patient has been scheduled for a hospital follow up with [facility] for 12/3/2021 @ 11 am with GUILLERMO Montero.

## 2021-11-23 VITALS
SYSTOLIC BLOOD PRESSURE: 116 MMHG | HEART RATE: 86 BPM | OXYGEN SATURATION: 92 % | WEIGHT: 293 LBS | HEIGHT: 67 IN | DIASTOLIC BLOOD PRESSURE: 71 MMHG | RESPIRATION RATE: 18 BRPM | BODY MASS INDEX: 45.99 KG/M2 | TEMPERATURE: 98.5 F

## 2021-11-23 PROBLEM — J81.0 ACUTE PULMONARY EDEMA (HCC): Status: RESOLVED | Noted: 2021-11-18 | Resolved: 2021-11-23

## 2021-11-23 PROBLEM — J44.1 COPD WITH ACUTE EXACERBATION: Status: RESOLVED | Noted: 2021-11-18 | Resolved: 2021-11-23

## 2021-11-23 PROBLEM — J96.01 ACUTE RESPIRATORY FAILURE WITH HYPOXIA AND HYPERCAPNIA: Status: RESOLVED | Noted: 2021-11-18 | Resolved: 2021-11-23

## 2021-11-23 PROBLEM — J96.02 ACUTE RESPIRATORY FAILURE WITH HYPOXIA AND HYPERCAPNIA (HCC): Status: RESOLVED | Noted: 2021-11-18 | Resolved: 2021-11-23

## 2021-11-23 LAB
ANION GAP SERPL CALCULATED.3IONS-SCNC: 8 MMOL/L (ref 5–15)
BUN SERPL-MCNC: 21 MG/DL (ref 6–20)
BUN/CREAT SERPL: 42 (ref 7–25)
CALCIUM SPEC-SCNC: 9.2 MG/DL (ref 8.6–10.5)
CHLORIDE SERPL-SCNC: 90 MMOL/L (ref 98–107)
CO2 SERPL-SCNC: 39 MMOL/L (ref 22–29)
CREAT SERPL-MCNC: 0.5 MG/DL (ref 0.57–1)
GFR SERPL CREATININE-BSD FRML MDRD: 127 ML/MIN/1.73
GLUCOSE BLDC GLUCOMTR-MCNC: 112 MG/DL (ref 70–130)
GLUCOSE BLDC GLUCOMTR-MCNC: 167 MG/DL (ref 70–130)
GLUCOSE SERPL-MCNC: 122 MG/DL (ref 65–99)
POTASSIUM SERPL-SCNC: 4.2 MMOL/L (ref 3.5–5.2)
SODIUM SERPL-SCNC: 137 MMOL/L (ref 136–145)

## 2021-11-23 PROCEDURE — 63710000001 INSULIN DETEMIR PER 5 UNITS: Performed by: INTERNAL MEDICINE

## 2021-11-23 PROCEDURE — 99239 HOSP IP/OBS DSCHRG MGMT >30: CPT | Performed by: INTERNAL MEDICINE

## 2021-11-23 PROCEDURE — 63710000001 INSULIN LISPRO (HUMAN) PER 5 UNITS: Performed by: NURSE PRACTITIONER

## 2021-11-23 PROCEDURE — 94799 UNLISTED PULMONARY SVC/PX: CPT

## 2021-11-23 PROCEDURE — 63710000001 PREDNISONE PER 1 MG: Performed by: INTERNAL MEDICINE

## 2021-11-23 PROCEDURE — 80048 BASIC METABOLIC PNL TOTAL CA: CPT | Performed by: INTERNAL MEDICINE

## 2021-11-23 PROCEDURE — 25010000002 FUROSEMIDE PER 20 MG: Performed by: INTERNAL MEDICINE

## 2021-11-23 PROCEDURE — 25010000002 ENOXAPARIN PER 10 MG: Performed by: NURSE PRACTITIONER

## 2021-11-23 PROCEDURE — 82962 GLUCOSE BLOOD TEST: CPT

## 2021-11-23 RX ORDER — FUROSEMIDE 40 MG/1
40 TABLET ORAL DAILY
Qty: 30 TABLET | Refills: 0 | Status: ON HOLD | OUTPATIENT
Start: 2021-11-23 | End: 2022-10-14 | Stop reason: SDUPTHER

## 2021-11-23 RX ORDER — PREDNISONE 20 MG/1
40 TABLET ORAL
Qty: 6 TABLET | Refills: 0 | Status: SHIPPED | OUTPATIENT
Start: 2021-11-24 | End: 2021-11-27

## 2021-11-23 RX ORDER — BENZONATATE 100 MG/1
100 CAPSULE ORAL 3 TIMES DAILY PRN
Qty: 30 CAPSULE | Refills: 0 | Status: SHIPPED | OUTPATIENT
Start: 2021-11-23 | End: 2021-12-03

## 2021-11-23 RX ORDER — NICOTINE 21 MG/24HR
1 PATCH, TRANSDERMAL 24 HOURS TRANSDERMAL
Qty: 28 EACH | Refills: 0 | Status: SHIPPED | OUTPATIENT
Start: 2021-11-24 | End: 2021-12-03

## 2021-11-23 RX ADMIN — PREDNISONE 40 MG: 20 TABLET ORAL at 08:26

## 2021-11-23 RX ADMIN — ENOXAPARIN SODIUM 40 MG: 40 INJECTION SUBCUTANEOUS at 08:26

## 2021-11-23 RX ADMIN — NICOTINE 1 PATCH: 14 PATCH, EXTENDED RELEASE TRANSDERMAL at 05:40

## 2021-11-23 RX ADMIN — LISINOPRIL 20 MG: 20 TABLET ORAL at 08:26

## 2021-11-23 RX ADMIN — IPRATROPIUM BROMIDE AND ALBUTEROL SULFATE 3 ML: 2.5; .5 SOLUTION RESPIRATORY (INHALATION) at 09:15

## 2021-11-23 RX ADMIN — SODIUM CHLORIDE, PRESERVATIVE FREE 10 ML: 5 INJECTION INTRAVENOUS at 08:27

## 2021-11-23 RX ADMIN — INSULIN LISPRO 5 UNITS: 100 INJECTION, SOLUTION INTRAVENOUS; SUBCUTANEOUS at 11:25

## 2021-11-23 RX ADMIN — INSULIN LISPRO 2 UNITS: 100 INJECTION, SOLUTION INTRAVENOUS; SUBCUTANEOUS at 11:26

## 2021-11-23 RX ADMIN — INSULIN DETEMIR 20 UNITS: 100 INJECTION, SOLUTION SUBCUTANEOUS at 08:25

## 2021-11-23 RX ADMIN — INSULIN LISPRO 5 UNITS: 100 INJECTION, SOLUTION INTRAVENOUS; SUBCUTANEOUS at 08:26

## 2021-11-23 RX ADMIN — FUROSEMIDE 60 MG: 10 INJECTION INTRAMUSCULAR; INTRAVENOUS at 08:25

## 2021-11-23 NOTE — DISCHARGE SUMMARY
[facility] Medicine Services  DISCHARGE SUMMARY    Patient Name: Jovanna Ching  : 1965  MRN: [id number]    Date of Admission: 2021  5:26 PM  Date of Discharge:  2021  Primary Care Physician: Lulu Love APRN    Consults     No orders found from 10/20/2021 to 2021.          Hospital Course     Presenting Problem:   Acute respiratory failure with hypercapnia (HCC) [J96.02]    Active Hospital Problems    Diagnosis  POA   • Tobacco abuse [Z72.0]  Yes   • Essential hypertension [I10]  Yes   • Type 2 diabetes mellitus (HCC) [E11.9]  Yes      Resolved Hospital Problems    Diagnosis Date Resolved POA   • **Acute respiratory failure with hypoxia and hypercapnia (HCC) [J96.01, J96.02] 2021 Yes   • Acute pulmonary edema (HCC) [J81.0] 2021 Yes   • COPD with acute exacerbation (HCC) [J44.1] 2021 Yes          Hospital Course:  Jovanna Ching is a 56 y.o. female with history of COPD with ongoing tobacco abuse 1 to 2 L NC, type 2 diabetes, hypertension.  She presented to the ED with progressively worsening SOA     Acute respiratory failure with hypoxia and hypercapnia  COPD with exacerbation with ongoing tobacco abuse  Acute pulmonary edema  -Upon arrival patient with O2 sats of 77% on presentation ABG with PCO2 81.5, initially 100% nonrebreather transitioned to BiPAP. She was treated with IV steroids, abx, nebs/inh as well as IV diuretics to which she responded well and was ultimately on her home O2 of 2.5-3L on day of d/c.   -She will continue PO prednisone for 3 more days at d/c in addition to her home inh/nebs. She was followed by COPD nurse navigator throughout her stay.  -Her lasix dose was increased to 40mg daily upon d/c.     Discharge Follow Up Recommendations for outpatient labs/diagnostics:   PCP in 1 week w/ [facility]   Pulmonary on 12/3 as scheduled    Sleep medicine for sleep study.    Day of Discharge     HPI: Up in bed. Says she is doing better. Noted  improvement with increased diuretics yesterday.     Review of Systems  Gen- No fevers, chills  CV- No chest pain, palpitations  Resp- No cough, dyspnea  GI- No N/V/D, abd pain    Vital Signs:   Temp:  [97.8 °F (36.6 °C)-98.5 °F (36.9 °C)] 98.5 °F (36.9 °C)  Heart Rate:  [73-86] 86  Resp:  [18-20] 18  BP: (108-129)/(64-88) 116/71     Physical Exam:  Constitutional: No acute distress, awake, alert, chronically ill appearing  HENT: NCAT, mucous membranes moist, O2 via NC  Respiratory: Clear to auscultation bilaterally, respiratory effort normal   Cardiovascular: RRR, no murmurs, rubs, or gallops  Gastrointestinal: Positive bowel sounds, soft, nontender, nondistended, obese  Musculoskeletal: No bilateral ankle edema  Psychiatric: Appropriate affect, cooperative  Neurologic: Oriented x 3, strength symmetric in all extremities, Cranial Nerves grossly intact to confrontation, speech clear  Skin: No rashes    Pertinent  and/or Most Recent Results     LAB RESULTS:      Lab 11/19/21  0406 11/18/21  1723   WBC 9.29 11.69*   HEMOGLOBIN 13.0 13.0   HEMATOCRIT 44.8 44.3   PLATELETS 355 376   NEUTROS ABS 8.40* 9.37*   IMMATURE GRANS (ABS) 0.06* 0.06*   LYMPHS ABS 0.57* 1.42   MONOS ABS 0.24 0.72   EOS ABS 0.00 0.07   MCV 94.5 93.5         Lab 11/23/21  0527 11/20/21  0444 11/19/21  0406 11/18/21  1723   SODIUM 137 139 138 138   POTASSIUM 4.2 4.3 5.0 4.8   CHLORIDE 90* 95* 96* 100   CO2 39.0* 39.0* 35.0* 31.0*   ANION GAP 8.0 5.0 7.0 7.0   BUN 21* 22* 16 18   CREATININE 0.50* 0.51* 0.51* 0.58   GLUCOSE 122* 195* 168* 160*   CALCIUM 9.2 8.9 9.0 8.9   HEMOGLOBIN A1C  --   --  7.30*  --          Lab 11/18/21  1723   TOTAL PROTEIN 7.3   ALBUMIN 3.80   GLOBULIN 3.5   ALT (SGPT) 35*   AST (SGOT) 27   BILIRUBIN 0.3   ALK PHOS 104         Lab 11/18/21  1723   PROBNP 610.6   TROPONIN T <0.010                 Lab 11/19/21  1734 11/19/21  0817 11/19/21  0338   PH, ARTERIAL 7.403 7.317* 7.321*   PCO2, ARTERIAL 65.0* 82.6* 78.0*   PO2 ART 65.0*  64.5* 180.0*   FIO2 40 40 75   HCO3 ART 40.6* 42.2* 40.2*   BASE EXCESS ART 12.9* 12.3* 10.8*   CARBOXYHEMOGLOBIN 1.4 1.3 1.3     Brief Urine Lab Results     None        Microbiology Results (last 10 days)     Procedure Component Value - Date/Time    COVID PRE-OP / PRE-PROCEDURE SCREENING ORDER (NO ISOLATION) - Swab, Nasopharynx [[id number]]  (Normal) Collected: 11/18/21 1724    Lab Status: Final result Specimen: Swab from Nasopharynx Updated: 11/18/21 1809    Narrative:      The following orders were created for panel order COVID PRE-OP / PRE-PROCEDURE SCREENING ORDER (NO ISOLATION) - Swab, Nasopharynx.  Procedure                               Abnormality         Status                     ---------                               -----------         ------                     COVID-19 and FLU A/B PCR...[[id number]]  Normal              Final result                 Please view results for these tests on the individual orders.    COVID-19 and FLU A/B PCR - Swab, Nasopharynx [[id number]]  (Normal) Collected: 11/18/21 1724    Lab Status: Final result Specimen: Swab from Nasopharynx Updated: 11/18/21 1809     COVID19 Not Detected     Influenza A PCR Not Detected     Influenza B PCR Not Detected    Narrative:      Fact sheet for providers: https://www.fda.gov/media/832629/download    Fact sheet for patients: https://www.fda.gov/media/555419/download    Test performed by PCR.          Adult Transthoracic Echo Complete w/ Color, Spectral and Contrast if necessary per protocol    Result Date: 11/19/2021  · Left ventricular ejection fraction appears to be 61 - 65%. Left ventricular systolic function is normal. · Left ventricular diastolic function was normal. · The right ventricular cavity is mildly dilated. · Moderate tricuspid valve regurgitation is present. · Estimated right ventricular systolic pressure from tricuspid regurgitation is mildly elevated (35-45 mmHg). · Mild pulmonary hypertension is present.      XR Chest 1  View    Result Date: 11/18/2021  EXAMINATION: XR CHEST 1 VW - 11/18/2021  INDICATION: Shortness of air.  COMPARISON: 08/12/2021  FINDINGS: There has been interval enlargement of the heart, pulmonary vasculature and interval development of extensive pulmonary interstitial edema. There are small pleural effusions. No densely consolidated lung or pneumothorax is seen.      Interval development of cardiomegaly and congestive heart failure. Extensive bilateral pulmonary interstitial edema and small pleural effusions.  DICTATED:   11/18/2021 EDITED/lfs:   11/18/2021    This report was finalized on 11/18/2021 9:58 PM by Dr. Otilio Ramirez MD.      CT Angiogram Chest    Result Date: 11/18/2021  CTA Chest INDICATION: Respiratory failure and hypercapnia. Hypoxia. Heart failure. Shortness of air and back pain. TECHNIQUE: CT angiogram of the chest with IV contrast. 3-D reconstructions were obtained and reviewed.   Radiation dose reduction techniques included automated exposure control or exposure modulation based on body size. Count of known CT and cardiac nuc med studies performed in previous 12 months: 0. COMPARISON: 1/11/2009 FINDINGS: No pulmonary embolism or aortic dissection is identified. There are tiny bilateral pleural effusions. No pericardial effusion is seen. There is enlargement of the right thyroid lobe containing a hypodense nodule measuring at least 2.6 cm. This can be further evaluated with a nonemergent ultrasound as indicated. There is mediastinal and bilateral hilar adenopathy. For example, a right hilar node measures 1.4 cm. A left hilar node measures 1.2 cm. Right paratracheal node measures 1.5 cm. Several other mildly enlarged nodes are present as well. These are nonspecific. No axillary adenopathy is seen. Upper abdominal images show mild left adrenal hyperplasia. Lung windows show COPD. Consolidations in the lower lobes are likely some compressive atelectasis. There is background groundglass opacity with  some septal thickening, most likely the result of pulmonary edema. Imaging features are atypical or uncommonly reported for COVID-19 pneumonia. Alternative diagnoses should be considered. No pneumothorax is identified.     1. No PE or aortic dissection. 2. Tiny bilateral pleural effusions with some adjacent lower lobe atelectasis. COPD is noted with background pulmonary edema. 3. Mediastinal and bilateral hilar adenopathy is nonspecific. Considerations would include reactive adenopathy, sarcoid, or even neoplasm such as lymphoma or metastatic disease. Attention on 3 month follow-up chest CT is recommended. 4. Right thyroid enlargement can be assessed with nonemergent ultrasound is needed. Signer Name: Charly Noland MD  Signed: 11/18/2021 11:03 PM  Workstation Name: [facility]  [facility]              Results for orders placed during the hospital encounter of 11/18/21    Adult Transthoracic Echo Complete w/ Color, Spectral and Contrast if necessary per protocol    Interpretation Summary  · Left ventricular ejection fraction appears to be 61 - 65%. Left ventricular systolic function is normal.  · Left ventricular diastolic function was normal.  · The right ventricular cavity is mildly dilated.  · Moderate tricuspid valve regurgitation is present.  · Estimated right ventricular systolic pressure from tricuspid regurgitation is mildly elevated (35-45 mmHg).  · Mild pulmonary hypertension is present.      Plan for Follow-up of Pending Labs/Results:     Discharge Details        Discharge Medications      New Medications      Instructions Start Date   benzonatate 100 MG capsule  Commonly known as: TESSALON   100 mg, Oral, 3 Times Daily PRN      nicotine 14 MG/24HR patch  Commonly known as: NICODERM CQ   1 patch, Transdermal, Every 24 Hours Scheduled   Start Date: November 24, 2021     predniSONE 20 MG tablet  Commonly known as: DELTASONE   40 mg, Oral, Daily With Breakfast   Start Date: November  24, 2021        Changes to Medications      Instructions Start Date   furosemide 40 MG tablet  Commonly known as: LASIX  What changed:   medication strength  how much to take   40 mg, Oral, Daily         Continue These Medications      Instructions Start Date   albuterol sulfate  (90 Base) MCG/ACT inhaler  Commonly known as: PROVENTIL HFA;VENTOLIN HFA;PROAIR HFA   2 puffs, Inhalation, Every 4 Hours PRN      albuterol (2.5 MG/3ML) 0.083% nebulizer solution  Commonly known as: PROVENTIL   2.5 mg, Nebulization, Every 4 Hours PRN      Anoro Ellipta 62.5-25 MCG/INH aerosol powder  inhaler  Generic drug: umeclidinium-vilanterol   1 puff, Inhalation, Daily - RT      lisinopril 20 MG tablet  Commonly known as: PRINIVIL,ZESTRIL   20 mg, Oral, Daily      metFORMIN 500 MG tablet  Commonly known as: GLUCOPHAGE   500 mg, Oral, Daily With Breakfast      potassium chloride 10 MEQ CR capsule  Commonly known as: MICRO-K   10 mEq, Oral, Daily             No Known Allergies      Discharge Disposition:  Home or Self Care    Diet:  Hospital:  Diet Order   Procedures   • Diet Regular; Cardiac, Consistent Carbohydrate       Restrictions or Other Recommendations:  -Take all medications as directed  -Follow a low salt diet, goal = 2g or less daily  -Weigh yourself every morning before breakfast, write it down and compare to yesterday’s weight. If you have weight gain greater than 3lbs in 1 day or 5 lbs or more in 1 week , call your PCP or Heart Failure Clinic.  -If you experience any of the following symptoms please contact your PCP or Heart Failure Clinic:    Increased shortness of breath   Increased swelling of feet or stomach   Dry hacking cough   Shortness of breath when laying down flat    - [facility]:  [phone]         CODE STATUS:    Code Status and Medical Interventions:   Ordered at: 11/18/21 2221     Level Of Support Discussed With:    Patient     Code Status (Patient has no pulse and is not  breathing):    CPR (Attempt to Resuscitate)     Medical Interventions (Patient has pulse or is breathing):    Full Support       Future Appointments   Date Time Provider Department Center   12/3/2021 11:00 AM Veronica Rothman APRN [facility]       Additional Instructions for the Follow-ups that You Need to Schedule     Ambulatory Referral to Sleep Medicine   As directed      Discharge Follow-up with PCP   As directed       Currently Documented PCP:    Lulu Love APRN    PCP Phone Number:    [phone]     Follow Up Details: 1 week hospital follow up                     Ena Munoz II,   11/23/21      Time Spent on Discharge:  I spent  33 minutes on this discharge activity which included: face-to-face encounter with the patient, reviewing the data in the system, coordination of the care with the nursing staff as well as consultants, documentation, and entering orders.

## 2021-11-23 NOTE — PROGRESS NOTES
NN spoke with pt at BS.  Pt alert and able to answer questions appropriately. Pt O2 sat > 90% on 3  L currently, home O2 use 2.5L. COPD action plan reviewed. Deep breathing exercises encouraged. Patient informed of upcoming pulmonary clinic appointment date and time.  No new concerns or questions voiced at this time.  NN will continue to follow as needed.       Per current GOLD Standards, please consider: No ICS in place, Outpatient PFT, Pulmonary rehab as appropriate, NRT at DC        Patient has been scheduled for a hospital follow up with [facility] for 12/3/2021 @ 11 am with GUILLERMO Montero.

## 2021-11-23 NOTE — DISCHARGE INSTRUCTIONS
Restrictions or Other Recommendations:  -Take all medications as directed  -Follow a low salt diet, goal = 2g or less daily  -Weigh yourself every morning before breakfast, write it down and compare to yesterday’s weight. If you have weight gain greater than 3lbs in 1 day or 5 lbs or more in 1 week , call your PCP or Heart Failure Clinic.  -If you experience any of the following symptoms please contact your PCP or Heart Failure Clinic:               Increased shortness of breath              Increased swelling of feet or stomach              Dry hacking cough              Shortness of breath when laying down flat

## 2021-11-24 ENCOUNTER — READMISSION MANAGEMENT (OUTPATIENT)
Dept: CALL CENTER | Facility: HOSPITAL | Age: 56
End: 2021-11-24

## 2021-11-24 NOTE — OUTREACH NOTE
Prep Survey      Responses   [facility] facility patient discharged from? [place]   Is LACE score < 7 ? No   Emergency Room discharge w/ pulse ox? No   Eligibility Readm Mgmt   Discharge diagnosis COPD with acute exacerbation   Does the patient have one of the following disease processes/diagnoses(primary or secondary)? COPD/Pneumonia   Does the patient have Home health ordered? No   Is there a DME ordered? No   Prep survey completed? Yes          Elizabeth Barboza RN

## 2021-11-29 ENCOUNTER — READMISSION MANAGEMENT (OUTPATIENT)
Dept: CALL CENTER | Facility: HOSPITAL | Age: 56
End: 2021-11-29

## 2021-11-29 NOTE — OUTREACH NOTE
COPD/PN Week 1 Survey      Responses   [facility] patient discharged from? [place]   Does the patient have one of the following disease processes/diagnoses(primary or secondary)? COPD/Pneumonia   Was the primary reason for admission: COPD exacerbation   Week 1 attempt successful? No   Unsuccessful attempts Attempt 1          Deb Lee, RN

## 2021-12-02 ENCOUNTER — READMISSION MANAGEMENT (OUTPATIENT)
Dept: CALL CENTER | Facility: HOSPITAL | Age: 56
End: 2021-12-02

## 2021-12-02 NOTE — OUTREACH NOTE
COPD/PN Week 1 Survey      Responses   [facility] patient discharged from? [place]   Does the patient have one of the following disease processes/diagnoses(primary or secondary)? COPD/Pneumonia   Was the primary reason for admission: COPD exacerbation   Week 1 attempt successful? No   Unsuccessful attempts Attempt 2          Rubina Yeager RN

## 2021-12-03 ENCOUNTER — OFFICE VISIT (OUTPATIENT)
Dept: PULMONOLOGY | Facility: CLINIC | Age: 56
End: 2021-12-03

## 2021-12-03 VITALS
DIASTOLIC BLOOD PRESSURE: 74 MMHG | SYSTOLIC BLOOD PRESSURE: 138 MMHG | OXYGEN SATURATION: 96 % | RESPIRATION RATE: 20 BRPM | HEIGHT: 68 IN | BODY MASS INDEX: 41.68 KG/M2 | TEMPERATURE: 97.5 F | HEART RATE: 85 BPM | WEIGHT: 275 LBS

## 2021-12-03 DIAGNOSIS — R59.0 MEDIASTINAL LYMPHADENOPATHY: ICD-10-CM

## 2021-12-03 DIAGNOSIS — R29.818 SUSPECTED SLEEP APNEA: ICD-10-CM

## 2021-12-03 DIAGNOSIS — G47.419 NARCOLEPSY WITHOUT CATAPLEXY: ICD-10-CM

## 2021-12-03 DIAGNOSIS — Z72.0 TOBACCO ABUSE: ICD-10-CM

## 2021-12-03 DIAGNOSIS — I27.20 PULMONARY HTN (HCC): ICD-10-CM

## 2021-12-03 DIAGNOSIS — J44.9 CHRONIC OBSTRUCTIVE PULMONARY DISEASE, UNSPECIFIED COPD TYPE (HCC): ICD-10-CM

## 2021-12-03 DIAGNOSIS — J41.1 MUCOPURULENT CHRONIC BRONCHITIS (HCC): ICD-10-CM

## 2021-12-03 DIAGNOSIS — J96.11 CHRONIC RESPIRATORY FAILURE WITH HYPOXIA (HCC): Primary | ICD-10-CM

## 2021-12-03 PROCEDURE — 99214 OFFICE O/P EST MOD 30 MIN: CPT | Performed by: NURSE PRACTITIONER

## 2021-12-03 RX ORDER — POTASSIUM CHLORIDE 750 MG/1
TABLET, FILM COATED, EXTENDED RELEASE ORAL
COMMUNITY
Start: 2021-10-26 | End: 2021-12-03

## 2021-12-03 NOTE — PROGRESS NOTES
"[facility] Pulmonary Follow up      Chief Complaint  Hospital Follow Up Visit    Subjective          Jovanna Ching presents to [facility] for hospital follow-up. She was initially seen in the office to establish care in August with Dr. Lopes. She was admitted to [facility] and discharged November 23 with acute hypoxic respiratory failure. Her oxygen saturations on presentation was 77% on room air. Her CT angiogram was negative for PE showed some bilateral effusions consistent with COPD and pulmonary edema. She did have an echo which extracted 6065% and mild pulmonary hypertension.  She did complete a course of steroids as well as some diuresis with Lasix.    Her CT scan of the chest also showed some lymphadenopathy. Recommendations were to repeat in 3 months.      Since discharge from the hospital she continues on her 2 L of oxygen.  She feels much better using the oxygen with activity.  Overall she actually feels much better after her discharge.    She is having trouble sleeping.  She was noticed to have some nocturnal hypoxemia in the hospital and did tolerate BiPAP in the hospital for her acute respiratory failure.  She did notice after the first night she slept much better on the BiPAP.  Currently she only sleep 2 to 3 hours at a time and then wake up.  She feels very tired and fatigued upon mornings.  She will fall asleep easily even when sitting up.    She does continue on the Anoro daily and her albuterol nebulizers as needed.  She feels like the Anoro works well.    She has been using some nicotine patches to try to cut down on her cigarette use.      Objective     Vital Signs:   /74   Pulse 85   Temp 97.5 °F (36.4 °C)   Resp 20   Ht 172.7 cm (68\")   Wt 125 kg (275 lb)   SpO2 96% Comment: 2L O2 assist, room air, resting  BMI 41.81 kg/m²       Immunization History   Administered Date(s) Administered   • Flu Vaccine Quad PF >36MO 11/16/2020   • " FluLaval/Fluarix/Fluzone >6 11/16/2020, 10/26/2021   • Pneumococcal Polysaccharide (PPSV23) 10/26/2021   • Tdap 10/26/2021       Physical Exam  Vitals reviewed.   Constitutional:       General: She is not in acute distress.     Appearance: She is well-developed. She is obese. She is not ill-appearing.   HENT:      Head: Normocephalic and atraumatic.   Eyes:      Pupils: Pupils are equal, round, and reactive to light.   Cardiovascular:      Rate and Rhythm: Normal rate and regular rhythm.      Heart sounds: No murmur heard.      Pulmonary:      Effort: Pulmonary effort is normal. No respiratory distress.      Breath sounds: Rales present. No wheezing.   Abdominal:      General: Bowel sounds are normal. There is no distension.      Palpations: Abdomen is soft.   Musculoskeletal:         General: Normal range of motion.      Cervical back: Normal range of motion and neck supple.      Right lower leg: Edema present.      Left lower leg: Edema present.   Skin:     General: Skin is warm and dry.      Findings: No erythema.   Neurological:      Mental Status: She is alert and oriented to person, place, and time.   Psychiatric:         Behavior: Behavior normal.          Result Review :       Data reviewed: Radiologic studies CT scan done in the hospital, Cardiology studies Including an echocardiogram done on the 19th and Recent hospitalization notes From her hospital stay, including her discharge summary and follow-up notes from internal medicine.                    Assessment and Plan    Problem List Items Addressed This Visit        Pulmonary and Pneumonias    Mucopurulent chronic bronchitis (HCC)    Chronic respiratory failure with hypoxia (on 3 L nasal cannula) - Primary    COPD (chronic obstructive pulmonary disease) (HCC)    Pulmonary HTN (HCC)    Overview     RVSP 35-45            Tobacco    Tobacco abuse      Other Visit Diagnoses     Mediastinal lymphadenopathy        Relevant Orders    CT Chest Without Contrast     Suspected sleep apnea        Relevant Orders    Polysomnography 4 or More Parameters    Narcolepsy without cataplexy         Relevant Orders    Polysomnography 4 or More Parameters          She does have some evidence of cor pulmonale with pulmonary hypertension and underlying lung disease.  She would likely benefit from cardiology consult or evaluation in the future, to help with her edema and hypertension.    We did discuss the importance of tobacco cessation today in the office.  I gave her information on quit NOW [place] as well as continued use of her nicotine patches.  She does have some issues affording the patches.    Continue on her Anoro daily and nebulizers as needed.      We do need to follow-up on the CT scan of her chest given her lymphadenopathy, that would be due in 3 months in February.    She also has suspected sleep apnea, most likely given her symptoms as noted above.  I would like for her to get a in lab study and then follow back up here in the office for results.  She is agreeable to PAP therapy.  She did use in the hospital and did find that it helped her sleep.    Follow Up     Return in about 3 months (around 3/3/2022).  Patient was given instructions and counseling regarding her condition or for health maintenance advice. Please see specific information pulled into the AVS if appropriate.     I spent 35 minutes caring for Jovanna on this date of service. This time includes time spent by me in the following activities:preparing for the visit, reviewing tests, obtaining and/or reviewing a separately obtained history, performing a medically appropriate examination and/or evaluation , counseling and educating the patient/family/caregiver, ordering medications, tests, or procedures, referring and communicating with other health care professionals , documenting information in the medical record and independently interpreting results and communicating that information with the  patient/family/caregiver    Moderate level of Medical Decision Making complexity based on 2 or more chronic stable illnesses and prescription drug management.    Veronica Rothman APRN, ACNP  [facility]  Electronically signed

## 2021-12-06 ENCOUNTER — READMISSION MANAGEMENT (OUTPATIENT)
Dept: CALL CENTER | Facility: HOSPITAL | Age: 56
End: 2021-12-06

## 2021-12-06 NOTE — OUTREACH NOTE
COPD/PN Week 1 Survey      Responses   [facility] patient discharged from? [place]   Does the patient have one of the following disease processes/diagnoses(primary or secondary)? COPD/Pneumonia   Was the primary reason for admission: COPD exacerbation   Week 1 attempt successful? No   Unsuccessful attempts Attempt 3          Sherri Pickard LPN

## 2021-12-16 ENCOUNTER — READMISSION MANAGEMENT (OUTPATIENT)
Dept: CALL CENTER | Facility: HOSPITAL | Age: 56
End: 2021-12-16

## 2021-12-16 NOTE — OUTREACH NOTE
COPD/PN Week 2 Survey      Responses   [facility] patient discharged from? [place]   Does the patient have one of the following disease processes/diagnoses(primary or secondary)? COPD/Pneumonia   Was the primary reason for admission: COPD exacerbation   Week 2 attempt successful? No   Unsuccessful attempts Attempt 1          Rubina Yeager RN

## 2021-12-29 ENCOUNTER — OFFICE VISIT (OUTPATIENT)
Dept: SLEEP MEDICINE | Facility: HOSPITAL | Age: 56
End: 2021-12-29

## 2021-12-29 VITALS
SYSTOLIC BLOOD PRESSURE: 138 MMHG | WEIGHT: 273.6 LBS | HEART RATE: 90 BPM | BODY MASS INDEX: 41.46 KG/M2 | OXYGEN SATURATION: 92 % | DIASTOLIC BLOOD PRESSURE: 60 MMHG | HEIGHT: 68 IN

## 2021-12-29 DIAGNOSIS — F51.04 PSYCHOPHYSIOLOGICAL INSOMNIA: ICD-10-CM

## 2021-12-29 DIAGNOSIS — R09.02 HYPOXEMIA: ICD-10-CM

## 2021-12-29 DIAGNOSIS — R06.83 SNORING: Primary | ICD-10-CM

## 2021-12-29 DIAGNOSIS — G47.33 OBSTRUCTIVE SLEEP APNEA, ADULT: ICD-10-CM

## 2021-12-29 DIAGNOSIS — E66.2 OBESITY HYPOVENTILATION SYNDROME: ICD-10-CM

## 2021-12-29 DIAGNOSIS — E66.01 MORBID (SEVERE) OBESITY DUE TO EXCESS CALORIES: ICD-10-CM

## 2021-12-29 PROCEDURE — 99213 OFFICE O/P EST LOW 20 MIN: CPT | Performed by: INTERNAL MEDICINE

## 2021-12-29 RX ORDER — TRAZODONE HYDROCHLORIDE 50 MG/1
100 TABLET ORAL NIGHTLY
Status: ON HOLD | COMMUNITY
End: 2022-10-14 | Stop reason: SDUPTHER

## 2022-01-18 NOTE — PROGRESS NOTES
Chief Complaint  Snoring and nonrestorative sleep.    Subjective         Jovanna Ching presents to [facility] for the evaluation of snoring and nonrestorative sleep.  She is referred by Dr. Ngo.  She is also seen by GUILLERMO Merrill.  She is seen in person in sleep clinic.  History of Present Illness  Patient complains that she does not sleep well.  She awakens every hour and 1/2 to 2 hours during the night.  She is hard to arouse in the morning.  She has been worse recently.  She has tried taking Seroquel and trazodone but it did not seem to help her sleep.  She has had snoring noted for at least 20 years.  She did not have any noted apneas.  She has been on supplemental oxygen for the past 2 months.  She denies awakening gasping for breath.    She will awaken with a dry mouth.  She denies ever breaking her nose or having trouble breathing through her nose.  She denies any kicking or jerking of her legs at night.  She has some back pain that may bother her.  She denies awakening with a morning headache.    She goes to bed between 10 and 11 PM.  She has it takes her an hour to fall asleep but she is watching TV.  She awakens 4 times during the night.  She thinks she only gets about 4 hours of sleep but is not rested.  She has had hypertension none for 3 years.  She has had a history of diabetes.  She has been told she had congestive heart failure and was noted to have elevated right-sided heart pressures.    Allergies: Without    Habits: Smoking: He smoked about 1/4 pack/day for 44 years    Alcohol: Without    Caffeine: She has 3 cups of coffee per day and 1 serving of cola per day    Medical illnesses: She has a history of hypertension, arthritis, chronic pain, diabetes.    Surgeries: She has had     Family history: Positive for COPD and asthma    Review of systems: Positives include activity change, drooling, leg swelling, back pain, sleep disturbance.  Other systems  "reviewed and negative.    [place] score is 5/24  Objective   Vital Signs:   /60 (BP Location: Left arm, Patient Position: Sitting, Cuff Size: Adult)   Pulse 90   Ht 172.7 cm (68\")   Wt 124 kg (273 lb 9.6 oz)   SpO2 92%   BMI 41.60 kg/m²     Physical Exam patient appears to be awake and alert.  She does not appear to be in acute respiratory distress.    She is normocephalic.  She has Mallampati class IV anatomy.    Lungs are clear.    Cardiac exam revealed normal S1-S2.    Extremities showed 3+ pedal edema.  Result Review :             Assessment and Plan   Diagnoses and all orders for this visit:    1. Snoring (Primary)  -     Polysomnography 4 or More Parameters; Future  -     COVID PRE-OP / PRE-PROCEDURE SCREENING ORDER (NO ISOLATION) - Swab, Nasopharynx; Future    2. Obstructive sleep apnea, adult  -     Polysomnography 4 or More Parameters; Future  -     COVID PRE-OP / PRE-PROCEDURE SCREENING ORDER (NO ISOLATION) - Swab, Nasopharynx; Future    3. Psychophysiological insomnia    4. Morbid (severe) obesity due to excess calories (HCC)    5. Obesity hypoventilation syndrome (HCC)  -     Polysomnography 4 or More Parameters; Future    6. Hypoxemia    Patient has a history of snoring and nonrestorative sleep.  She gives an excellent story for obstructive sleep apnea.  We will plan to proceed to polysomnogram.  She is on supplemental oxygen.  We have discussed potential therapies including CPAP, weight control, oral appliance, and surgery.  We have discussed the long-term consequences of untreated obstructive sleep apnea.  These include hypertension, diabetes, heart disease, stroke, and dementia.  She is encouraged to lose weight.  She is encouraged to avoid alcohol and sedatives close to bedtime.  She is encouraged to practice lateral position sleep.  We will see her back after her study.    She also seems to have some significant psychophysiologic insomnia.  We discussed measures to try to improve sleep " hygiene.  She is to get a regular rise time and a regular bedtime.  She is to get light exposure early in the day.  She is to cut off caffeine by mid afternoon.  She is develop a nighttime ritual and get off electronic screens by at least an hour before the time she wishes to go the sleep.  We will need to discuss these issues further on her return.  I spent 35 minutes caring for Jovanna on this date of service. This time includes time spent by me in the following activities:obtaining and/or reviewing a separately obtained history, performing a medically appropriate examination and/or evaluation , counseling and educating the patient/family/caregiver, ordering medications, tests, or procedures and documenting information in the medical record  Follow Up   Return for Follow up after study, Next scheduled follow-up.  Patient was given instructions and counseling regarding her condition or for health maintenance advice. Please see specific information pulled into the AVS if appropriate.   Brown Eli MD [facility]  Sleep Medicine  Pulmonary and Critical Care Medicine

## 2022-02-09 ENCOUNTER — HOSPITAL ENCOUNTER (OUTPATIENT)
Dept: CT IMAGING | Facility: HOSPITAL | Age: 57
Discharge: HOME | End: 2022-02-09
Admitting: NURSE PRACTITIONER

## 2022-02-09 DIAGNOSIS — R59.0 MEDIASTINAL LYMPHADENOPATHY: ICD-10-CM

## 2022-02-09 PROCEDURE — 71250 CT THORAX DX C-: CPT

## 2022-03-09 ENCOUNTER — OFFICE VISIT (OUTPATIENT)
Dept: PULMONOLOGY | Facility: CLINIC | Age: 57
End: 2022-03-09

## 2022-03-09 VITALS
DIASTOLIC BLOOD PRESSURE: 112 MMHG | HEART RATE: 93 BPM | TEMPERATURE: 98 F | WEIGHT: 263 LBS | BODY MASS INDEX: 39.86 KG/M2 | HEIGHT: 68 IN | RESPIRATION RATE: 18 BRPM | OXYGEN SATURATION: 98 % | SYSTOLIC BLOOD PRESSURE: 152 MMHG

## 2022-03-09 DIAGNOSIS — J41.1 MUCOPURULENT CHRONIC BRONCHITIS: ICD-10-CM

## 2022-03-09 DIAGNOSIS — J96.11 CHRONIC RESPIRATORY FAILURE WITH HYPOXIA: ICD-10-CM

## 2022-03-09 DIAGNOSIS — J44.9 CHRONIC OBSTRUCTIVE PULMONARY DISEASE, UNSPECIFIED COPD TYPE: Primary | ICD-10-CM

## 2022-03-09 DIAGNOSIS — Z72.0 TOBACCO ABUSE: ICD-10-CM

## 2022-03-09 PROCEDURE — 99214 OFFICE O/P EST MOD 30 MIN: CPT | Performed by: NURSE PRACTITIONER

## 2022-03-09 RX ORDER — FENOFIBRATE 160 MG/1
160 TABLET ORAL DAILY
COMMUNITY
Start: 2022-01-26

## 2022-03-09 RX ORDER — CEPHALEXIN 500 MG/1
500 CAPSULE ORAL 2 TIMES DAILY
Qty: 20 CAPSULE | Refills: 0 | Status: ON HOLD | OUTPATIENT
Start: 2022-03-09 | End: 2022-10-11

## 2022-03-09 NOTE — PROGRESS NOTES
"[facility] Pulmonary Follow up      Chief Complaint  Post CT (F/u)    Subjective          Jovanna Ching presents to [facility] for follow-up CT scan of her chest.  I last saw her in December after a hospitalization.  She had had a CT scan that showed some lymphadenopathy she is here today for her repeat 3-month scan.    She does have COPD with chronic bronchitis, she has been doing fairly well lately.  She does continue to use her Anoro daily.  She has not really been using her rescue inhaler.  She denies any recent acute exacerbations or illnesses.    She was started on oxygen after her hospital stay in November, she continues to use her oxygen as needed.  She always takes her tanks with her when she is out of her home.  She wears it most nights.  Overall she is wearing it less than she did a few months ago.    She has been cutting down on her smoking.    Currently she is having quite a bit of stressors related to her mother who lives with her.      Objective     Vital Signs:   BP (!) 152/112 (BP Location: Left arm, Patient Position: Sitting, Cuff Size: Adult)   Pulse 93   Temp 98 °F (36.7 °C)   Resp 18   Ht 172.7 cm (68\")   Wt 119 kg (263 lb)   SpO2 98%   PF (!) 2 L/min Comment: Pulse at resting  BMI 39.99 kg/m²       Immunization History   Administered Date(s) Administered   • Flu Vaccine Quad PF >36MO 11/16/2020   • FluLaval/Fluarix/Fluzone >6 11/16/2020, 10/26/2021   • Pneumococcal Polysaccharide (PPSV23) 10/26/2021   • Tdap 10/26/2021       Physical Exam  Vitals reviewed.   Constitutional:       General: She is not in acute distress.     Appearance: She is well-developed. She is obese. She is not ill-appearing.   HENT:      Head: Normocephalic and atraumatic.   Eyes:      Pupils: Pupils are equal, round, and reactive to light.   Cardiovascular:      Rate and Rhythm: Normal rate and regular rhythm.      Heart sounds: No murmur heard.  Pulmonary:      " Effort: Pulmonary effort is normal. No respiratory distress.      Breath sounds: Normal breath sounds. No wheezing or rales.   Abdominal:      General: Bowel sounds are normal. There is no distension.      Palpations: Abdomen is soft.   Musculoskeletal:         General: Normal range of motion.      Cervical back: Normal range of motion and neck supple.   Skin:     General: Skin is warm and dry.      Findings: No erythema.   Neurological:      Mental Status: She is alert and oriented to person, place, and time.   Psychiatric:         Behavior: Behavior normal.          Result Review :       Data reviewed: Radiologic studies CT scan chest from February 9                  Assessment and Plan    Problem List Items Addressed This Visit        Pulmonary and Pneumonias    Mucopurulent chronic bronchitis (HCC)    Relevant Medications    cephalexin (Keflex) 500 MG capsule    Chronic respiratory failure with hypoxia (on 3 L nasal cannula)    COPD (chronic obstructive pulmonary disease) (HCC) - Primary       Tobacco    Tobacco abuse        We did review her CT scan which shows resolution of the lymphadenopathy.  She will continue with annual low-dose screening CT scans with her ongoing tobacco use.    We did discuss the importance of tobacco cessation today in the office.    She will continue on her Anoro daily and albuterol as needed.      Follow Up     No follow-ups on file.  Patient was given instructions and counseling regarding her condition or for health maintenance advice. Please see specific information pulled into the AVS if appropriate.     I spent 35 minutes caring for Jovanna on this date of service. This time includes time spent by me in the following activities:preparing for the visit, reviewing tests, obtaining and/or reviewing a separately obtained history, performing a medically appropriate examination and/or evaluation , counseling and educating the patient/family/caregiver, ordering medications, tests, or  procedures, referring and communicating with other health care professionals , documenting information in the medical record and independently interpreting results and communicating that information with the patient/family/caregiver    Moderate level of Medical Decision Making complexity based on 2 or more chronic stable illnesses and prescription drug management.    GUILLERMO Coughlin, ACNP  [facility]  Electronically signed

## 2022-10-10 ENCOUNTER — APPOINTMENT (OUTPATIENT)
Dept: GENERAL RADIOLOGY | Facility: HOSPITAL | Age: 57
End: 2022-10-10

## 2022-10-10 ENCOUNTER — APPOINTMENT (OUTPATIENT)
Dept: CT IMAGING | Facility: HOSPITAL | Age: 57
End: 2022-10-10

## 2022-10-10 ENCOUNTER — HOSPITAL ENCOUNTER (INPATIENT)
Facility: HOSPITAL | Age: 57
LOS: 4 days | Discharge: HOME | End: 2022-10-14
Attending: EMERGENCY MEDICINE | Admitting: HOSPITALIST

## 2022-10-10 DIAGNOSIS — S92.911A CLOSED FRACTURE OF PHALANX OF TOE OF RIGHT FOOT, PHYSEAL INVOLVEMENT UNSPECIFIED, UNSPECIFIED TOE, INITIAL ENCOUNTER: ICD-10-CM

## 2022-10-10 DIAGNOSIS — J96.21 ACUTE ON CHRONIC RESPIRATORY FAILURE WITH HYPOXIA AND HYPERCAPNIA: Primary | ICD-10-CM

## 2022-10-10 DIAGNOSIS — J96.22 ACUTE ON CHRONIC RESPIRATORY FAILURE WITH HYPOXIA AND HYPERCAPNIA: Primary | ICD-10-CM

## 2022-10-10 DIAGNOSIS — J45.901 EXACERBATION OF ASTHMA, UNSPECIFIED ASTHMA SEVERITY, UNSPECIFIED WHETHER PERSISTENT: ICD-10-CM

## 2022-10-10 DIAGNOSIS — U07.1 COVID-19 VIRUS INFECTION: ICD-10-CM

## 2022-10-10 LAB
ALBUMIN SERPL-MCNC: 3.8 G/DL (ref 3.5–5.2)
ALBUMIN/GLOB SERPL: 1 G/DL
ALP SERPL-CCNC: 147 U/L (ref 39–117)
ALT SERPL W P-5'-P-CCNC: 34 U/L (ref 1–33)
ANION GAP SERPL CALCULATED.3IONS-SCNC: 7 MMOL/L (ref 5–15)
ARTERIAL PATENCY WRIST A: ABNORMAL
AST SERPL-CCNC: 27 U/L (ref 1–32)
ATMOSPHERIC PRESS: ABNORMAL MM[HG]
BASE EXCESS BLDA CALC-SCNC: 7.3 MMOL/L (ref 0–2)
BASOPHILS # BLD AUTO: 0.04 10*3/MM3 (ref 0–0.2)
BASOPHILS NFR BLD AUTO: 0.4 % (ref 0–1.5)
BDY SITE: ABNORMAL
BILIRUB SERPL-MCNC: 0.3 MG/DL (ref 0–1.2)
BODY TEMPERATURE: 37 C
BUN SERPL-MCNC: 16 MG/DL (ref 6–20)
BUN/CREAT SERPL: 25.4 (ref 7–25)
CALCIUM SPEC-SCNC: 9.1 MG/DL (ref 8.6–10.5)
CHLORIDE SERPL-SCNC: 97 MMOL/L (ref 98–107)
CO2 BLDA-SCNC: 38.3 MMOL/L (ref 22–33)
CO2 SERPL-SCNC: 33 MMOL/L (ref 22–29)
COHGB MFR BLD: 4.8 % (ref 0–2)
CREAT SERPL-MCNC: 0.63 MG/DL (ref 0.57–1)
CRP SERPL-MCNC: 6.37 MG/DL (ref 0–0.5)
D DIMER PPP FEU-MCNC: 1.03 MCGFEU/ML (ref 0.01–0.5)
DEPRECATED RDW RBC AUTO: 48.3 FL (ref 37–54)
EGFRCR SERPLBLD CKD-EPI 2021: 103 ML/MIN/1.73
EOSINOPHIL # BLD AUTO: 0.2 10*3/MM3 (ref 0–0.4)
EOSINOPHIL NFR BLD AUTO: 1.9 % (ref 0.3–6.2)
EPAP: 0
ERYTHROCYTE [DISTWIDTH] IN BLOOD BY AUTOMATED COUNT: 14.9 % (ref 12.3–15.4)
FERRITIN SERPL-MCNC: 229.4 NG/ML (ref 13–150)
FLUAV SUBTYP SPEC NAA+PROBE: NOT DETECTED
FLUBV RNA ISLT QL NAA+PROBE: NOT DETECTED
GLOBULIN UR ELPH-MCNC: 3.7 GM/DL
GLUCOSE BLDC GLUCOMTR-MCNC: 317 MG/DL (ref 70–130)
GLUCOSE SERPL-MCNC: 163 MG/DL (ref 65–99)
HCO3 BLDA-SCNC: 36.1 MMOL/L (ref 20–26)
HCT VFR BLD AUTO: 41.6 % (ref 34–46.6)
HCT VFR BLD CALC: 40.7 % (ref 38–51)
HGB BLD-MCNC: 12.6 G/DL (ref 12–15.9)
HGB BLDA-MCNC: 13.3 G/DL (ref 14–18)
HOLD SPECIMEN: NORMAL
IMM GRANULOCYTES # BLD AUTO: 0.08 10*3/MM3 (ref 0–0.05)
IMM GRANULOCYTES NFR BLD AUTO: 0.8 % (ref 0–0.5)
INHALED O2 CONCENTRATION: 40 %
IPAP: 0
LDH SERPL-CCNC: 446 U/L (ref 135–214)
LYMPHOCYTES # BLD AUTO: 1.54 10*3/MM3 (ref 0.7–3.1)
LYMPHOCYTES NFR BLD AUTO: 15 % (ref 19.6–45.3)
Lab: ABNORMAL
MCH RBC QN AUTO: 26.8 PG (ref 26.6–33)
MCHC RBC AUTO-ENTMCNC: 30.3 G/DL (ref 31.5–35.7)
MCV RBC AUTO: 88.3 FL (ref 79–97)
METHGB BLD QL: 0.4 % (ref 0–1.5)
MODALITY: ABNORMAL
MONOCYTES # BLD AUTO: 0.77 10*3/MM3 (ref 0.1–0.9)
MONOCYTES NFR BLD AUTO: 7.5 % (ref 5–12)
NEUTROPHILS NFR BLD AUTO: 7.65 10*3/MM3 (ref 1.7–7)
NEUTROPHILS NFR BLD AUTO: 74.4 % (ref 42.7–76)
NOTE: ABNORMAL
NOTIFIED BY: ABNORMAL
NOTIFIED WHO: ABNORMAL
NRBC BLD AUTO-RTO: 0 /100 WBC (ref 0–0.2)
NT-PROBNP SERPL-MCNC: 161.4 PG/ML (ref 0–900)
OXYHGB MFR BLDV: 85.3 % (ref 94–99)
PAW @ PEAK INSP FLOW SETTING VENT: 0 CMH2O
PCO2 BLDA: 71.3 MM HG (ref 35–45)
PCO2 TEMP ADJ BLD: 71.3 MM HG (ref 35–45)
PH BLDA: 7.31 PH UNITS (ref 7.35–7.45)
PH, TEMP CORRECTED: 7.31 PH UNITS
PLATELET # BLD AUTO: 363 10*3/MM3 (ref 140–450)
PMV BLD AUTO: 10.4 FL (ref 6–12)
PO2 BLDA: 62.5 MM HG (ref 83–108)
PO2 TEMP ADJ BLD: 62.5 MM HG (ref 83–108)
POTASSIUM SERPL-SCNC: 4.9 MMOL/L (ref 3.5–5.2)
PROT SERPL-MCNC: 7.5 G/DL (ref 6–8.5)
QT INTERVAL: 370 MS
QTC INTERVAL: 437 MS
RBC # BLD AUTO: 4.71 10*6/MM3 (ref 3.77–5.28)
SARS-COV-2 RNA PNL SPEC NAA+PROBE: DETECTED
SODIUM SERPL-SCNC: 137 MMOL/L (ref 136–145)
TOTAL RATE: 0 BREATHS/MINUTE
TROPONIN T SERPL-MCNC: <0.01 NG/ML (ref 0–0.03)
WBC NRBC COR # BLD: 10.28 10*3/MM3 (ref 3.4–10.8)
WHOLE BLOOD HOLD COAG: NORMAL
WHOLE BLOOD HOLD SPECIMEN: NORMAL

## 2022-10-10 PROCEDURE — 63710000001 INSULIN REGULAR HUMAN PER 5 UNITS: Performed by: INTERNAL MEDICINE

## 2022-10-10 PROCEDURE — 71275 CT ANGIOGRAPHY CHEST: CPT

## 2022-10-10 PROCEDURE — 86140 C-REACTIVE PROTEIN: CPT | Performed by: INTERNAL MEDICINE

## 2022-10-10 PROCEDURE — 87636 SARSCOV2 & INF A&B AMP PRB: CPT | Performed by: EMERGENCY MEDICINE

## 2022-10-10 PROCEDURE — 99223 1ST HOSP IP/OBS HIGH 75: CPT | Performed by: INTERNAL MEDICINE

## 2022-10-10 PROCEDURE — 71045 X-RAY EXAM CHEST 1 VIEW: CPT

## 2022-10-10 PROCEDURE — 36415 COLL VENOUS BLD VENIPUNCTURE: CPT

## 2022-10-10 PROCEDURE — 83615 LACTATE (LD) (LDH) ENZYME: CPT | Performed by: INTERNAL MEDICINE

## 2022-10-10 PROCEDURE — 85025 COMPLETE CBC W/AUTO DIFF WBC: CPT | Performed by: EMERGENCY MEDICINE

## 2022-10-10 PROCEDURE — 94799 UNLISTED PULMONARY SVC/PX: CPT

## 2022-10-10 PROCEDURE — 82728 ASSAY OF FERRITIN: CPT | Performed by: INTERNAL MEDICINE

## 2022-10-10 PROCEDURE — 82375 ASSAY CARBOXYHB QUANT: CPT

## 2022-10-10 PROCEDURE — 85379 FIBRIN DEGRADATION QUANT: CPT | Performed by: EMERGENCY MEDICINE

## 2022-10-10 PROCEDURE — 94640 AIRWAY INHALATION TREATMENT: CPT

## 2022-10-10 PROCEDURE — 83880 ASSAY OF NATRIURETIC PEPTIDE: CPT | Performed by: EMERGENCY MEDICINE

## 2022-10-10 PROCEDURE — 82962 GLUCOSE BLOOD TEST: CPT

## 2022-10-10 PROCEDURE — 25010000002 ENOXAPARIN PER 10 MG: Performed by: INTERNAL MEDICINE

## 2022-10-10 PROCEDURE — 0 IOPAMIDOL PER 1 ML: Performed by: EMERGENCY MEDICINE

## 2022-10-10 PROCEDURE — 36600 WITHDRAWAL OF ARTERIAL BLOOD: CPT

## 2022-10-10 PROCEDURE — 80053 COMPREHEN METABOLIC PANEL: CPT | Performed by: EMERGENCY MEDICINE

## 2022-10-10 PROCEDURE — 25010000002 FUROSEMIDE PER 20 MG: Performed by: EMERGENCY MEDICINE

## 2022-10-10 PROCEDURE — 25010000002 METHYLPREDNISOLONE PER 40 MG: Performed by: EMERGENCY MEDICINE

## 2022-10-10 PROCEDURE — 25010000002 REMDESIVIR 100 MG/20ML SOLUTION 1 EACH VIAL: Performed by: INTERNAL MEDICINE

## 2022-10-10 PROCEDURE — XW033E5 INTRODUCTION OF REMDESIVIR ANTI-INFECTIVE INTO PERIPHERAL VEIN, PERCUTANEOUS APPROACH, NEW TECHNOLOGY GROUP 5: ICD-10-PCS | Performed by: HOSPITALIST

## 2022-10-10 PROCEDURE — 83050 HGB METHEMOGLOBIN QUAN: CPT

## 2022-10-10 PROCEDURE — 99285 EMERGENCY DEPT VISIT HI MDM: CPT

## 2022-10-10 PROCEDURE — 94660 CPAP INITIATION&MGMT: CPT

## 2022-10-10 PROCEDURE — 93005 ELECTROCARDIOGRAM TRACING: CPT | Performed by: EMERGENCY MEDICINE

## 2022-10-10 PROCEDURE — 82805 BLOOD GASES W/O2 SATURATION: CPT

## 2022-10-10 PROCEDURE — 84484 ASSAY OF TROPONIN QUANT: CPT | Performed by: EMERGENCY MEDICINE

## 2022-10-10 RX ORDER — SODIUM CHLORIDE 0.9 % (FLUSH) 0.9 %
10 SYRINGE (ML) INJECTION AS NEEDED
Status: DISCONTINUED | OUTPATIENT
Start: 2022-10-10 | End: 2022-10-14 | Stop reason: HOSPADM

## 2022-10-10 RX ORDER — NICOTINE POLACRILEX 4 MG
15 LOZENGE BUCCAL
Status: DISCONTINUED | OUTPATIENT
Start: 2022-10-10 | End: 2022-10-14 | Stop reason: HOSPADM

## 2022-10-10 RX ORDER — FENOFIBRATE 145 MG/1
145 TABLET, COATED ORAL DAILY
Status: DISCONTINUED | OUTPATIENT
Start: 2022-10-11 | End: 2022-10-14 | Stop reason: HOSPADM

## 2022-10-10 RX ORDER — DEXTROSE MONOHYDRATE 25 G/50ML
25 INJECTION, SOLUTION INTRAVENOUS
Status: DISCONTINUED | OUTPATIENT
Start: 2022-10-10 | End: 2022-10-14 | Stop reason: HOSPADM

## 2022-10-10 RX ORDER — ENOXAPARIN SODIUM 100 MG/ML
40 INJECTION SUBCUTANEOUS DAILY
Status: DISCONTINUED | OUTPATIENT
Start: 2022-10-10 | End: 2022-10-10

## 2022-10-10 RX ORDER — ALBUTEROL SULFATE 90 UG/1
2 AEROSOL, METERED RESPIRATORY (INHALATION) EVERY 6 HOURS PRN
Status: DISCONTINUED | OUTPATIENT
Start: 2022-10-10 | End: 2022-10-14 | Stop reason: HOSPADM

## 2022-10-10 RX ORDER — NICOTINE 21 MG/24HR
1 PATCH, TRANSDERMAL 24 HOURS TRANSDERMAL
Status: DISCONTINUED | OUTPATIENT
Start: 2022-10-10 | End: 2022-10-14 | Stop reason: HOSPADM

## 2022-10-10 RX ORDER — HYDROCODONE BITARTRATE AND ACETAMINOPHEN 5; 325 MG/1; MG/1
1 TABLET ORAL EVERY 4 HOURS PRN
Status: DISCONTINUED | OUTPATIENT
Start: 2022-10-10 | End: 2022-10-14

## 2022-10-10 RX ORDER — TRAZODONE HYDROCHLORIDE 50 MG/1
50 TABLET ORAL NIGHTLY
Status: DISCONTINUED | OUTPATIENT
Start: 2022-10-10 | End: 2022-10-14 | Stop reason: HOSPADM

## 2022-10-10 RX ORDER — FUROSEMIDE 10 MG/ML
80 INJECTION INTRAMUSCULAR; INTRAVENOUS ONCE
Status: COMPLETED | OUTPATIENT
Start: 2022-10-10 | End: 2022-10-10

## 2022-10-10 RX ORDER — SODIUM CHLORIDE 0.9 % (FLUSH) 0.9 %
10 SYRINGE (ML) INJECTION EVERY 12 HOURS SCHEDULED
Status: DISCONTINUED | OUTPATIENT
Start: 2022-10-10 | End: 2022-10-14 | Stop reason: HOSPADM

## 2022-10-10 RX ORDER — METHYLPREDNISOLONE SODIUM SUCCINATE 40 MG/ML
80 INJECTION, POWDER, LYOPHILIZED, FOR SOLUTION INTRAMUSCULAR; INTRAVENOUS ONCE
Status: COMPLETED | OUTPATIENT
Start: 2022-10-10 | End: 2022-10-10

## 2022-10-10 RX ORDER — ALBUTEROL SULFATE 90 UG/1
2 AEROSOL, METERED RESPIRATORY (INHALATION) ONCE
Status: COMPLETED | OUTPATIENT
Start: 2022-10-10 | End: 2022-10-10

## 2022-10-10 RX ORDER — ONDANSETRON 2 MG/ML
4 INJECTION INTRAMUSCULAR; INTRAVENOUS EVERY 6 HOURS PRN
Status: DISCONTINUED | OUTPATIENT
Start: 2022-10-10 | End: 2022-10-14 | Stop reason: HOSPADM

## 2022-10-10 RX ORDER — LISINOPRIL 20 MG/1
20 TABLET ORAL DAILY
Status: DISCONTINUED | OUTPATIENT
Start: 2022-10-11 | End: 2022-10-14 | Stop reason: HOSPADM

## 2022-10-10 RX ORDER — ACETAMINOPHEN 325 MG/1
650 TABLET ORAL EVERY 4 HOURS PRN
Status: DISCONTINUED | OUTPATIENT
Start: 2022-10-10 | End: 2022-10-14 | Stop reason: HOSPADM

## 2022-10-10 RX ORDER — DEXAMETHASONE SODIUM PHOSPHATE 4 MG/ML
6 INJECTION, SOLUTION INTRA-ARTICULAR; INTRALESIONAL; INTRAMUSCULAR; INTRAVENOUS; SOFT TISSUE DAILY
Status: DISCONTINUED | OUTPATIENT
Start: 2022-10-11 | End: 2022-10-14 | Stop reason: HOSPADM

## 2022-10-10 RX ORDER — IPRATROPIUM BROMIDE AND ALBUTEROL SULFATE 2.5; .5 MG/3ML; MG/3ML
3 SOLUTION RESPIRATORY (INHALATION) ONCE
Status: DISCONTINUED | OUTPATIENT
Start: 2022-10-10 | End: 2022-10-10

## 2022-10-10 RX ORDER — ENOXAPARIN SODIUM 150 MG/ML
1 INJECTION SUBCUTANEOUS EVERY 12 HOURS
Status: DISCONTINUED | OUTPATIENT
Start: 2022-10-10 | End: 2022-10-11

## 2022-10-10 RX ADMIN — Medication 10 ML: at 21:03

## 2022-10-10 RX ADMIN — IOPAMIDOL 95 ML: 755 INJECTION, SOLUTION INTRAVENOUS at 19:39

## 2022-10-10 RX ADMIN — TRAZODONE HYDROCHLORIDE 50 MG: 50 TABLET ORAL at 21:03

## 2022-10-10 RX ADMIN — METHYLPREDNISOLONE SODIUM SUCCINATE 80 MG: 40 INJECTION, POWDER, FOR SOLUTION INTRAMUSCULAR; INTRAVENOUS at 19:42

## 2022-10-10 RX ADMIN — REMDESIVIR 200 MG: 100 INJECTION, POWDER, LYOPHILIZED, FOR SOLUTION INTRAVENOUS at 22:40

## 2022-10-10 RX ADMIN — INSULIN HUMAN 5 UNITS: 100 INJECTION, SOLUTION PARENTERAL at 22:40

## 2022-10-10 RX ADMIN — Medication 1 PATCH: at 21:03

## 2022-10-10 RX ADMIN — ENOXAPARIN SODIUM 120 MG: 120 INJECTION SUBCUTANEOUS at 21:03

## 2022-10-10 RX ADMIN — FUROSEMIDE 80 MG: 10 INJECTION, SOLUTION INTRAMUSCULAR; INTRAVENOUS at 15:52

## 2022-10-10 RX ADMIN — ACETAMINOPHEN 650 MG: 325 TABLET, FILM COATED ORAL at 22:45

## 2022-10-10 RX ADMIN — ALBUTEROL SULFATE 2 PUFF: 90 AEROSOL, METERED RESPIRATORY (INHALATION) at 19:46

## 2022-10-10 NOTE — ED PROVIDER NOTES
" EMERGENCY DEPARTMENT ENCOUNTER    Pt Name: Jovanna Ching  MRN: [id number]  Pt :   1965  Room Number:    Date of encounter:  10/10/2022  PCP: Lulu Love APRN  ED Provider: Mundo Cortes MD    Historian: Patient      HPI:  Chief Complaint: Difficulty breathing        Context: Jovanna Ching is a 56 y.o. female who presents to the ED c/o difficulty breathing which is been ongoing for close to a week now.  She reports a mild cough but gradually increasing need for oxygen.  She is normally on 2 L of oxygen via nasal cannula and this is enough to keep her oxygen saturation in the 90s.  She continues to smoke but much less than her prior 1 pack/day.  She states that 1 pack will last her close to a week now.  The patient takes a \"water pill\" but did not take it today secondary to coming to the hospital and not wanting to have to urinate in route.  She notes that her lower extremities are becoming gradually more edematous than at her baseline.  She denies fevers, chills, nausea, vomiting.         PAST MEDICAL HISTORY  Past Medical History:   Diagnosis Date   • Arthritis    • Asthma    • Broken leg    • CHF (congestive heart failure) ([facility])    • Chronic pain    • COPD (chronic obstructive pulmonary disease) (HCC)    • Diabetes mellitus (HCC)    • GERD (gastroesophageal reflux disease)    • Hypertension    • Swelling          PAST SURGICAL HISTORY  Past Surgical History:   Procedure Laterality Date   • BACK SURGERY     •  SECTION     • VASCULAR SURGERY           FAMILY HISTORY  Family History   Family history unknown: Yes         SOCIAL HISTORY  Social History     Socioeconomic History   • Marital status:    Tobacco Use   • Smoking status: Every Day     Packs/day: 1.00     Years: 32.00     Pack years: 32.00     Types: Cigarettes   • Smokeless tobacco: Never   • Tobacco comments:     DOWN TO ONE A DAY   Vaping Use   • Vaping Use: Never used   Substance and Sexual Activity   • Drug use: Never   • " Sexual activity: Defer         ALLERGIES  Patient has no known allergies.        REVIEW OF SYSTEMS  Review of Systems       All systems reviewed and negative except for those discussed in HPI.       PHYSICAL EXAM    I have reviewed the triage vital signs and nursing notes.    ED Triage Vitals [10/10/22 1240]   Temp Heart Rate Resp BP SpO2   98 °F (36.7 °C) 106 20 170/80 (!) 88 %      Temp src Heart Rate Source Patient Position BP Location FiO2 (%)   -- Monitor Sitting Left arm --       Physical Exam  GENERAL:   Appears nontoxic.  She has oxygen on at 5 L/min with oxygen saturations in the low to mid 90s.  HENT: Nares patent.  EYES: No scleral icterus.  CV: Regular rhythm, tachycardic rate.  No murmurs gallops rubs  RESPIRATORY: Diminished breath sounds in all lung fields with distant breath sounds from body habitus.  Additionally the expiratory wheezes are heard in upper and mid lung fields.  No accessory muscle use or retractions.  Able to speak in relatively full sentences.  ABDOMEN: Soft, nontender  MUSCULOSKELETAL: No deformities.   NEURO: Alert, moves all extremities, follows commands.  SKIN: Warm, dry, no rash visualized.        LAB RESULTS  Recent Results (from the past 24 hour(s))   ECG 12 Lead    Collection Time: 10/10/22  1:40 PM   Result Value Ref Range    QT Interval 370 ms    QTC Interval 437 ms   Comprehensive Metabolic Panel    Collection Time: 10/10/22  3:48 PM    Specimen: Blood   Result Value Ref Range    Glucose 163 (H) 65 - 99 mg/dL    BUN 16 6 - 20 mg/dL    Creatinine 0.63 0.57 - 1.00 mg/dL    Sodium 137 136 - 145 mmol/L    Potassium 4.9 3.5 - 5.2 mmol/L    Chloride 97 (L) 98 - 107 mmol/L    CO2 33.0 (H) 22.0 - 29.0 mmol/L    Calcium 9.1 8.6 - 10.5 mg/dL    Total Protein 7.5 6.0 - 8.5 g/dL    Albumin 3.80 3.50 - 5.20 g/dL    ALT (SGPT) 34 (H) 1 - 33 U/L    AST (SGOT) 27 1 - 32 U/L    Alkaline Phosphatase 147 (H) 39 - 117 U/L    Total Bilirubin 0.3 0.0 - 1.2 mg/dL    Globulin 3.7 gm/dL    A/G  Ratio 1.0 g/dL    BUN/Creatinine Ratio 25.4 (H) 7.0 - 25.0    Anion Gap 7.0 5.0 - 15.0 mmol/L    eGFR 103.0 >60.0 mL/min/1.73   BNP    Collection Time: 10/10/22  3:48 PM    Specimen: Blood   Result Value Ref Range    proBNP 161.4 0.0 - 900.0 pg/mL   Troponin    Collection Time: 10/10/22  3:48 PM    Specimen: Blood   Result Value Ref Range    Troponin T <0.010 0.000 - 0.030 ng/mL   Green Top (Gel)    Collection Time: 10/10/22  3:48 PM   Result Value Ref Range    Extra Tube Hold for add-ons.    Lavender Top    Collection Time: 10/10/22  3:48 PM   Result Value Ref Range    Extra Tube hold for add-on    Gold Top - SST    Collection Time: 10/10/22  3:48 PM   Result Value Ref Range    Extra Tube Hold for add-ons.    Light Blue Top    Collection Time: 10/10/22  3:48 PM   Result Value Ref Range    Extra Tube Hold for add-ons.    CBC Auto Differential    Collection Time: 10/10/22  3:48 PM    Specimen: Blood   Result Value Ref Range    WBC 10.28 3.40 - 10.80 10*3/mm3    RBC 4.71 3.77 - 5.28 10*6/mm3    Hemoglobin 12.6 12.0 - 15.9 g/dL    Hematocrit 41.6 34.0 - 46.6 %    MCV 88.3 79.0 - 97.0 fL    MCH 26.8 26.6 - 33.0 pg    MCHC 30.3 (L) 31.5 - 35.7 g/dL    RDW 14.9 12.3 - 15.4 %    RDW-SD 48.3 37.0 - 54.0 fl    MPV 10.4 6.0 - 12.0 fL    Platelets 363 140 - 450 10*3/mm3    Neutrophil % 74.4 42.7 - 76.0 %    Lymphocyte % 15.0 (L) 19.6 - 45.3 %    Monocyte % 7.5 5.0 - 12.0 %    Eosinophil % 1.9 0.3 - 6.2 %    Basophil % 0.4 0.0 - 1.5 %    Immature Grans % 0.8 (H) 0.0 - 0.5 %    Neutrophils, Absolute 7.65 (H) 1.70 - 7.00 10*3/mm3    Lymphocytes, Absolute 1.54 0.70 - 3.10 10*3/mm3    Monocytes, Absolute 0.77 0.10 - 0.90 10*3/mm3    Eosinophils, Absolute 0.20 0.00 - 0.40 10*3/mm3    Basophils, Absolute 0.04 0.00 - 0.20 10*3/mm3    Immature Grans, Absolute 0.08 (H) 0.00 - 0.05 10*3/mm3    nRBC 0.0 0.0 - 0.2 /100 WBC   D-dimer, Quantitative    Collection Time: 10/10/22  3:48 PM    Specimen: Blood   Result Value Ref Range    D-Dimer,  Quantitative 1.03 (H) 0.01 - 0.50 MCGFEU/mL   COVID-19 and FLU A/B PCR - Swab, Nasopharynx    Collection Time: 10/10/22  3:51 PM    Specimen: Nasopharynx; Swab   Result Value Ref Range    COVID19 Detected (C) Not Detected - Ref. Range    Influenza A PCR Not Detected Not Detected    Influenza B PCR Not Detected Not Detected   Blood Gas, Arterial With Co-Ox    Collection Time: 10/10/22  4:43 PM    Specimen: Arterial Blood   Result Value Ref Range    Site Right Radial     Dat's Test N/A     pH, Arterial 7.312 (L) 7.350 - 7.450 pH units    pCO2, Arterial 71.3 (C) 35.0 - 45.0 mm Hg    pO2, Arterial 62.5 (L) 83.0 - 108.0 mm Hg    HCO3, Arterial 36.1 (H) 20.0 - 26.0 mmol/L    Base Excess, Arterial 7.3 (H) 0.0 - 2.0 mmol/L    Hemoglobin, Blood Gas 13.3 (L) 14 - 18 g/dL    Hematocrit, Blood Gas 40.7 38.0 - 51.0 %    Oxyhemoglobin 85.3 (L) 94 - 99 %    Methemoglobin 0.40 0.00 - 1.50 %    Carboxyhemoglobin 4.8 (H) 0 - 2 %    CO2 Content 38.3 (H) 22 - 33 mmol/L    Temperature 37.0 C    Barometric Pressure for Blood Gas      Modality Nasal Cannula     FIO2 40 %    Rate 0 Breaths/minute    PIP 0 cmH2O    IPAP 0     EPAP 0     Note      Notified Shiraz SALVADOR MD     Notified By [id number]     Notified Time 10/10/2022 16:42     pH, Temp Corrected 7.312 pH Units    pCO2, Temperature Corrected 71.3 (H) 35 - 45 mm Hg    pO2, Temperature Corrected 62.5 (L) 83 - 108 mm Hg       If labs were ordered, I independently reviewed the results.        RADIOLOGY  XR Chest 1 View    Result Date: 10/10/2022   DATE OF EXAM: 10/10/2022 3:32 PM  PROCEDURE: XR CHEST 1 VW-  INDICATIONS: CHF/COPD protocol  COMPARISON: 11/18/2021 and prior  TECHNIQUE: Portable Chest  FINDINGS:  Study limited by body habitus and overlying support and monitoring apparatus  The heart size is mildly enlarged. Pulmonary vascularity is congested and indistinct. Increased groundglass and interstitial opacities noted with a perihilar and bibasilar predominance.      IMPRESSION :  Cardiomegaly with mild pulmonary vascular congestion, pulmonary edema.[  This report was finalized on 10/10/2022 3:43 PM by Adam Taylor.        I ordered and reviewed the above noted radiographic studies.      I viewed images of chest x-ray which showed no infiltrate per my independent interpretation.    See radiologist's dictation for official interpretation.        PROCEDURES    Critical Care  Performed by: Marcial Hughes MD  Authorized by: Marcial Hughes MD     Critical care provider statement:     Critical care time (minutes):  35    Critical care time was exclusive of:  Separately billable procedures and treating other patients    Critical care was necessary to treat or prevent imminent or life-threatening deterioration of the following conditions:  Respiratory failure    Critical care was time spent personally by me on the following activities:  Ordering and performing treatments and interventions, ordering and review of laboratory studies, ordering and review of radiographic studies, pulse oximetry, re-evaluation of patient's condition, review of old charts, obtaining history from patient or surrogate, examination of patient, evaluation of patient's response to treatment, discussions with consultants and development of treatment plan with patient or surrogate        ECG 12 Lead   Final Result   Test Reason : SOB   Blood Pressure :   */*   mmHG   Vent. Rate :  84 BPM     Atrial Rate :  84 BPM      P-R Int : 140 ms          QRS Dur :  84 ms       QT Int : 370 ms       P-R-T Axes :  63  63  70 degrees      QTc Int : 437 ms      Normal sinus rhythm   Cannot rule out Anterior infarct (cited on or before 18-NOV-2021)   Abnormal ECG   When compared with ECG of 18-NOV-2021 17:17,   No significant change was found   Confirmed by MARCIAL HUGHES MD ([id number]) on 10/10/2022 2:39:32 PM      Referred By: EDMD           Confirmed By: MARCIAL HUGHES MD      ECG 12 Lead    (Results Pending)       MEDICATIONS GIVEN IN  ER    Medications   sodium chloride 0.9 % flush 10 mL (has no administration in time range)   albuterol sulfate HFA (PROVENTIL HFA;VENTOLIN HFA;PROAIR HFA) inhaler 2 puff (has no administration in time range)   methylPREDNISolone sodium succinate (SOLU-Medrol) injection 80 mg (has no administration in time range)   furosemide (LASIX) injection 80 mg (80 mg Intravenous Given 10/10/22 1552)         PROGRESS, DATA ANALYSIS, CONSULTS, AND MEDICAL DECISION MAKING    All labs have been independently reviewed by me.  All radiology studies have been reviewed by me and the radiologist dictating the report.   EKG's have been independently viewed and interpreted by me.      Differential diagnoses: Dyspnea with hypoxia.  Respiratory failure.  CHF versus restrictive lung disease secondary to body habitus versus COVID, PE, asthma exacerbation, etc.      ED Course as of 10/10/22 1806   Mon Oct 10, 2022   1519 We had not initially checked and oxygen saturation on her baseline 2 L.    81% on baseline 2 liters per min.  At this time.    ABG pending. [MS]   1718 I have patient on BiPAP and she is tolerating this well.  We have administered a DuoNeb.  Given the patient's chest x-ray findings along with her CHF history, lack of Lasix today, I have given her Lasix 80 mg IV. [MS]   1719 I have contacted Dr. Chapa, hospitalist for admission. [MS]   1720 The patient is diabetic but given risk benefit I have chosen to administer Solu-Medrol 80 mg IV. [MS]      ED Course User Index  [MS] Mundo Cortes MD             AS OF 18:06 EDT VITALS:    BP - 136/87  HR - 83  TEMP - 98 °F (36.7 °C)  O2 SATS - 93%                  DIAGNOSIS  Final diagnoses:   Acute on chronic respiratory failure with hypoxia and hypercapnia (HCC)   Exacerbation of asthma, unspecified asthma severity, unspecified whether persistent   COVID-19 virus infection         DISPOSITION  Admission           Mundo Cortes MD  10/10/22 2026

## 2022-10-10 NOTE — H&P
"    [facility]  HISTORY AND PHYSICAL    Patient Name: Jovanna Ching  : 1965  MRN: [id number]  Primary Care Physician: Lulu Love APRN  Date of admission: 10/10/2022      Subjective   Subjective     Chief Complaint:   Cough, shortness of breath    HPI:  Jovanna Ching is a 56 y.o. female who states that she began to feel better \"a week ago.\"  She specifically cites shortness of breath and cough as her initial symptoms.  She states that she came to the ER today because she had increased right toe pain, but nursing her PCP the office today, because of the cough and shortness of breath they suggested she come to the ER for further evaluation for that.  COVID test was positive here.  She also C/of dyspnea on exertion for the last 2 weeks and she feels like her chronically swollen bilateral lower extremities \"are a little worse\" over the last week.  She also confirms that she feels like she is wheezing for the last 24 hours.  Cough is nonproductive.  She denies chest pain, anosmia/ageusia, fever/chills, nausea/emesis, abdominal pain, bowel habit change, focal neurologic deficit beyond baseline, or syncope.  Medical history significant for hypertension, hyperlipidemia, asthma, COPD, type 2 diabetes.      Review of Systems   Constitutional: Negative.    HENT: Negative.    Eyes: Negative.    Respiratory: Positive for cough, shortness of breath and wheezing.    Cardiovascular: Positive for leg swelling.   Gastrointestinal: Negative.    Endocrine: Negative.    Genitourinary: Negative.    Musculoskeletal: Negative.    Skin: Negative.    Allergic/Immunologic: Negative.    Neurological: Negative.    Hematological: Negative.    Psychiatric/Behavioral: Negative.           Personal History     Past Medical History:   Diagnosis Date   • Arthritis    • Asthma    • Broken leg    • CHF (congestive heart failure) ([facility])    • Chronic pain    • COPD (chronic obstructive pulmonary disease) " (HCC)    • Diabetes mellitus (HCC)    • GERD (gastroesophageal reflux disease)    • Hypertension    • Swelling              Past Surgical History:   Procedure Laterality Date   • BACK SURGERY     •  SECTION     • VASCULAR SURGERY         Family History: Father had alcoholism.  She states she is otherwise unfamiliar with family medical history.    Social History:  reports that she has been smoking. She has a 32.00 pack-year smoking history. She has never used smokeless tobacco. She reports that she does not use drugs.  Social History     Social History Narrative   • Not on file       Medications:  Available home medication information reviewed.  (Not in a hospital admission)      No Known Allergies    Objective   Objective     Vital Signs:   Temp:  [98 °F (36.7 °C)] 98 °F (36.7 °C)  Heart Rate:  [] 84  Resp:  [20] 20  BP: (112-170)/(78-94) 112/94  Flow (L/min):  [4.5-5] 5       Physical Exam   Constitutional: Awake, alert, NAD, pleasant.  Eyes: PERRLA, sclerae anicteric, no conjunctival injection  HENT: NCAT, mucous membranes moist  Neck: Supple, no thyromegaly, no lymphadenopathy, trachea midline  Respiratory: Coarse breath sounds bilaterally, nonlabored respirations   Cardiovascular: RRR, no murmurs, rubs, or gallops, palpable pedal pulses bilaterally  Gastrointestinal: Positive bowel sounds, soft, nontender, nondistended  Musculoskeletal: 1+ bilateral ankle edema, no clubbing or cyanosis to extremities  Psychiatric: Appropriate affect, cooperative  Neurologic: Oriented x 3, strength symmetric in all extremities, Cranial Nerves grossly intact to confrontation, speech clear  Skin: No rashes, normal turgor.    Result Review:  I have personally reviewed the results from the time of this admission to 10/10/2022 18:45 EDT and agree with these findings:  [x]  Laboratory list / accordion  []  Microbiology  [x]  Radiology  [x]  EKG/Telemetry   []  Cardiology/Vascular   []  Pathology  []  Old records  []   Other:  Most notable findings include: On ABG, pH is 7.312, PCO2 71.3, PO2 62.5, possible venous gas.  D-dimer 1.03.  Reviewed all labs.  I personally reviewed chest x-ray which by my read shows mild increased vascular markings, some cephalization, cardiomegaly, likely mild edema.  No infiltrate.  CT chest is still pending at the time of this note.  I personally reviewed EKG which by my read shows normal sinus rhythm, normal axis, ventricular rate approximately 80 bpm, nonspecific ST/T wave changes.        LAB RESULTS:      Lab 10/10/22  1548   WBC 10.28   HEMOGLOBIN 12.6   HEMATOCRIT 41.6   PLATELETS 363   NEUTROS ABS 7.65*   IMMATURE GRANS (ABS) 0.08*   LYMPHS ABS 1.54   MONOS ABS 0.77   EOS ABS 0.20   MCV 88.3   D DIMER QUANT 1.03*         Lab 10/10/22  1548   SODIUM 137   POTASSIUM 4.9   CHLORIDE 97*   CO2 33.0*   ANION GAP 7.0   BUN 16   CREATININE 0.63   EGFR 103.0   GLUCOSE 163*   CALCIUM 9.1         Lab 10/10/22  1548   TOTAL PROTEIN 7.5   ALBUMIN 3.80   GLOBULIN 3.7   ALT (SGPT) 34*   AST (SGOT) 27   BILIRUBIN 0.3   ALK PHOS 147*         Lab 10/10/22  1548   PROBNP 161.4   TROPONIN T <0.010                 Lab 10/10/22  1643   PH, ARTERIAL 7.312*   PCO2, ARTERIAL 71.3*   PO2 ART 62.5*   FIO2 40   HCO3 ART 36.1*   BASE EXCESS ART 7.3*   CARBOXYHEMOGLOBIN 4.8*         Microbiology Results (last 10 days)     Procedure Component Value - Date/Time    COVID PRE-OP / PRE-PROCEDURE SCREENING ORDER (NO ISOLATION) - Swab, Nasopharynx [[id number]]  (Abnormal) Collected: 10/10/22 1551    Lab Status: Final result Specimen: Swab from Nasopharynx Updated: 10/10/22 1648    Narrative:      The following orders were created for panel order COVID PRE-OP / PRE-PROCEDURE SCREENING ORDER (NO ISOLATION) - Swab, Nasopharynx.  Procedure                               Abnormality         Status                     ---------                               -----------         ------                     COVID-19 and FLU A/B  PCR...[[id number]]  Abnormal            Final result                 Please view results for these tests on the individual orders.    COVID-19 and FLU A/B PCR - Swab, Nasopharynx [[id number]]  (Abnormal) Collected: 10/10/22 1551    Lab Status: Final result Specimen: Swab from Nasopharynx Updated: 10/10/22 1648     COVID19 Detected     Influenza A PCR Not Detected     Influenza B PCR Not Detected    Narrative:      Fact sheet for providers: https://www.fda.gov/media/357310/download    Fact sheet for patients: https://www.fda.gov/media/982466/download    Test performed by PCR.  Influenza A and Influenza B negative results should be considered presumptive in samples that have a positive SARS-CoV-2 result.    Competitive inhibition studies showed that SARS-CoV-2 virus, when present at concentrations above 3.6E+04 copies/mL, can inhibit the detection and amplification of influenza A and influenza B virus RNA if present at or below 1.8E+02 copies/mL or 4.9E+02 copies/mL, respectively, and may lead to false negative influenza virus results. If co-infection with influenza A or influenza B virus is suspected in samples with a positive SARS-CoV-2 result, the sample should be re-tested with another FDA cleared, approved, or authorized influenza test, if influenza virus detection would change clinical management.          XR Chest 1 View    Result Date: 10/10/2022   DATE OF EXAM: 10/10/2022 3:32 PM  PROCEDURE: XR CHEST 1 VW-  INDICATIONS: CHF/COPD protocol  COMPARISON: 11/18/2021 and prior  TECHNIQUE: Portable Chest  FINDINGS:  Study limited by body habitus and overlying support and monitoring apparatus  The heart size is mildly enlarged. Pulmonary vascularity is congested and indistinct. Increased groundglass and interstitial opacities noted with a perihilar and bibasilar predominance.      Impression: IMPRESSION : Cardiomegaly with mild pulmonary vascular congestion, pulmonary edema.[  This report was finalized on 10/10/2022  3:43 PM by Adam Taylor.        Results for orders placed during the hospital encounter of 11/18/21    Adult Transthoracic Echo Complete w/ Color, Spectral and Contrast if necessary per protocol    Interpretation Summary  · Left ventricular ejection fraction appears to be 61 - 65%. Left ventricular systolic function is normal.  · Left ventricular diastolic function was normal.  · The right ventricular cavity is mildly dilated.  · Moderate tricuspid valve regurgitation is present.  · Estimated right ventricular systolic pressure from tricuspid regurgitation is mildly elevated (35-45 mmHg).  · Mild pulmonary hypertension is present.      Assessment & Plan   Assessment & Plan     Active Hospital Problems    Diagnosis  POA   • **Acute on chronic respiratory failure with hypoxia and hypercapnia (HCC) [J96.21, J96.22]  Yes   • COVID-19 virus detected [U07.1]  Yes       56F with covid 19, acute respiratory failure with hypoxia    COVID-19  Acute hypoxic respiratory failure  History of asthma  Acute exacerbation of COPD  - Start daily IV remdesivir.  - Start daily IV dexamethasone.  - Monitor laboratory biomarkers.  - As needed ProAir inhaler.  - Continue O2 currently via BiPAP, can change to nasal cannula when possible to maintain saturations >92%.  - Lovenox 1 mg/kg SQ twice daily (full dose) for anticoagulation.  - Hold home Anoro Ellipta inhaler.    Type 2 diabetes  - We will hold home metformin for now.  - Add Levemir plus SSI coverage.    Hypertension  - Continue home lisinopril.    Hyperlipidemia  - She states she has taken a statin in the past but is not sure if he does now; there is not a statin listed on her home medication list.  - Continue fenofibrate.    Tobacco abuse  - Cessation advised/counseled.  - She requests nicotine patch which will be ordered.        DVT prophylaxis: Lovenox as above, full dose      CODE STATUS: Full  Code Status and Medical Interventions:   Ordered at: 10/10/22 5046     Level Of  Support Discussed With:    Patient     Code Status (Patient has no pulse and is not breathing):    CPR (Attempt to Resuscitate)     Medical Interventions (Patient has pulse or is breathing):    Full Support         Kishor Chapa III, DO  10/10/22

## 2022-10-11 LAB
ALBUMIN SERPL-MCNC: 3.7 G/DL (ref 3.5–5.2)
ALBUMIN/GLOB SERPL: 1.1 G/DL
ALP SERPL-CCNC: 130 U/L (ref 39–117)
ALT SERPL W P-5'-P-CCNC: 29 U/L (ref 1–33)
ANION GAP SERPL CALCULATED.3IONS-SCNC: 11 MMOL/L (ref 5–15)
AST SERPL-CCNC: 16 U/L (ref 1–32)
BASOPHILS # BLD AUTO: 0.02 10*3/MM3 (ref 0–0.2)
BASOPHILS NFR BLD AUTO: 0.2 % (ref 0–1.5)
BILIRUB CONJ SERPL-MCNC: <0.2 MG/DL (ref 0–0.3)
BILIRUB SERPL-MCNC: 0.2 MG/DL (ref 0–1.2)
BUN SERPL-MCNC: 15 MG/DL (ref 6–20)
BUN/CREAT SERPL: 25 (ref 7–25)
CALCIUM SPEC-SCNC: 8.9 MG/DL (ref 8.6–10.5)
CHLORIDE SERPL-SCNC: 93 MMOL/L (ref 98–107)
CO2 SERPL-SCNC: 30 MMOL/L (ref 22–29)
CREAT SERPL-MCNC: 0.6 MG/DL (ref 0.57–1)
DEPRECATED RDW RBC AUTO: 48.1 FL (ref 37–54)
EGFRCR SERPLBLD CKD-EPI 2021: 104.2 ML/MIN/1.73
EOSINOPHIL # BLD AUTO: 0 10*3/MM3 (ref 0–0.4)
EOSINOPHIL NFR BLD AUTO: 0 % (ref 0.3–6.2)
ERYTHROCYTE [DISTWIDTH] IN BLOOD BY AUTOMATED COUNT: 14.6 % (ref 12.3–15.4)
GLOBULIN UR ELPH-MCNC: 3.3 GM/DL
GLUCOSE BLDC GLUCOMTR-MCNC: 141 MG/DL (ref 70–130)
GLUCOSE BLDC GLUCOMTR-MCNC: 142 MG/DL (ref 70–130)
GLUCOSE BLDC GLUCOMTR-MCNC: 176 MG/DL (ref 70–130)
GLUCOSE BLDC GLUCOMTR-MCNC: 180 MG/DL (ref 70–130)
GLUCOSE SERPL-MCNC: 159 MG/DL (ref 65–99)
HCT VFR BLD AUTO: 42.6 % (ref 34–46.6)
HGB BLD-MCNC: 12.8 G/DL (ref 12–15.9)
IMM GRANULOCYTES # BLD AUTO: 0.08 10*3/MM3 (ref 0–0.05)
IMM GRANULOCYTES NFR BLD AUTO: 0.8 % (ref 0–0.5)
LDH SERPL-CCNC: 224 U/L (ref 135–214)
LYMPHOCYTES # BLD AUTO: 0.72 10*3/MM3 (ref 0.7–3.1)
LYMPHOCYTES NFR BLD AUTO: 6.8 % (ref 19.6–45.3)
MAGNESIUM SERPL-MCNC: 2.2 MG/DL (ref 1.6–2.6)
MCH RBC QN AUTO: 26.8 PG (ref 26.6–33)
MCHC RBC AUTO-ENTMCNC: 30 G/DL (ref 31.5–35.7)
MCV RBC AUTO: 89.3 FL (ref 79–97)
MONOCYTES # BLD AUTO: 0.38 10*3/MM3 (ref 0.1–0.9)
MONOCYTES NFR BLD AUTO: 3.6 % (ref 5–12)
NEUTROPHILS NFR BLD AUTO: 88.6 % (ref 42.7–76)
NEUTROPHILS NFR BLD AUTO: 9.45 10*3/MM3 (ref 1.7–7)
NRBC BLD AUTO-RTO: 0 /100 WBC (ref 0–0.2)
PLATELET # BLD AUTO: 357 10*3/MM3 (ref 140–450)
PMV BLD AUTO: 9.7 FL (ref 6–12)
POTASSIUM SERPL-SCNC: 4.9 MMOL/L (ref 3.5–5.2)
PROT SERPL-MCNC: 7 G/DL (ref 6–8.5)
RBC # BLD AUTO: 4.77 10*6/MM3 (ref 3.77–5.28)
SODIUM SERPL-SCNC: 134 MMOL/L (ref 136–145)
WBC NRBC COR # BLD: 10.65 10*3/MM3 (ref 3.4–10.8)

## 2022-10-11 PROCEDURE — 25010000002 REMDESIVIR 100 MG/20ML SOLUTION 1 EACH VIAL: Performed by: INTERNAL MEDICINE

## 2022-10-11 PROCEDURE — 25010000002 ENOXAPARIN PER 10 MG: Performed by: INTERNAL MEDICINE

## 2022-10-11 PROCEDURE — 99233 SBSQ HOSP IP/OBS HIGH 50: CPT | Performed by: HOSPITALIST

## 2022-10-11 PROCEDURE — 63710000001 DEXAMETHASONE PER 0.25 MG: Performed by: INTERNAL MEDICINE

## 2022-10-11 PROCEDURE — 94799 UNLISTED PULMONARY SVC/PX: CPT

## 2022-10-11 PROCEDURE — 63710000001 INSULIN REGULAR HUMAN PER 5 UNITS: Performed by: INTERNAL MEDICINE

## 2022-10-11 PROCEDURE — 80053 COMPREHEN METABOLIC PANEL: CPT | Performed by: INTERNAL MEDICINE

## 2022-10-11 PROCEDURE — 82248 BILIRUBIN DIRECT: CPT | Performed by: INTERNAL MEDICINE

## 2022-10-11 PROCEDURE — 85025 COMPLETE CBC W/AUTO DIFF WBC: CPT | Performed by: INTERNAL MEDICINE

## 2022-10-11 PROCEDURE — 83615 LACTATE (LD) (LDH) ENZYME: CPT | Performed by: INTERNAL MEDICINE

## 2022-10-11 PROCEDURE — 83735 ASSAY OF MAGNESIUM: CPT | Performed by: INTERNAL MEDICINE

## 2022-10-11 PROCEDURE — 82962 GLUCOSE BLOOD TEST: CPT

## 2022-10-11 RX ORDER — ENOXAPARIN SODIUM 100 MG/ML
40 INJECTION SUBCUTANEOUS
Status: DISCONTINUED | OUTPATIENT
Start: 2022-10-12 | End: 2022-10-14 | Stop reason: HOSPADM

## 2022-10-11 RX ADMIN — Medication 10 ML: at 21:03

## 2022-10-11 RX ADMIN — LISINOPRIL 20 MG: 20 TABLET ORAL at 09:11

## 2022-10-11 RX ADMIN — HYDROCODONE BITARTRATE AND ACETAMINOPHEN 1 TABLET: 5; 325 TABLET ORAL at 21:02

## 2022-10-11 RX ADMIN — FENOFIBRATE 145 MG: 145 TABLET ORAL at 09:11

## 2022-10-11 RX ADMIN — TRAZODONE HYDROCHLORIDE 50 MG: 50 TABLET ORAL at 20:50

## 2022-10-11 RX ADMIN — INSULIN HUMAN 2 UNITS: 100 INJECTION, SOLUTION PARENTERAL at 20:53

## 2022-10-11 RX ADMIN — HYDROCODONE BITARTRATE AND ACETAMINOPHEN 1 TABLET: 5; 325 TABLET ORAL at 11:16

## 2022-10-11 RX ADMIN — Medication 10 ML: at 09:12

## 2022-10-11 RX ADMIN — DEXAMETHASONE 6 MG: 2 TABLET ORAL at 09:11

## 2022-10-11 RX ADMIN — Medication 1 PATCH: at 09:13

## 2022-10-11 RX ADMIN — REMDESIVIR 100 MG: 100 INJECTION, POWDER, LYOPHILIZED, FOR SOLUTION INTRAVENOUS at 17:40

## 2022-10-11 RX ADMIN — INSULIN HUMAN 2 UNITS: 100 INJECTION, SOLUTION PARENTERAL at 17:40

## 2022-10-11 RX ADMIN — ENOXAPARIN SODIUM 120 MG: 120 INJECTION SUBCUTANEOUS at 09:10

## 2022-10-11 RX ADMIN — ACETAMINOPHEN 650 MG: 325 TABLET, FILM COATED ORAL at 03:47

## 2022-10-11 RX ADMIN — HYDROCODONE BITARTRATE AND ACETAMINOPHEN 1 TABLET: 5; 325 TABLET ORAL at 15:13

## 2022-10-11 NOTE — PLAN OF CARE
Goal Outcome Evaluation:  Plan of Care Reviewed With: patient      Patient VSS remaining on 6L O2 by NC.  Complaints of pain alleviated with PRN pain medications as ordered.  No complaints of SOA reported.  No other acute changes in patient condition.

## 2022-10-11 NOTE — PAYOR COMM NOTE
"Addy Lee (56 y.o. Female)     Tiera Washburn, SHAUNA  Utilization Review  Myuvc-[phone]  [phone]      Date of Birth   1965    Social Security Number       Address   [address] [place] [address]    Home Phone   [phone]    MRN   [id number]       Faith   Latter day    Marital Status                               Admission Date   10/10/22    Admission Type   Emergency    Admitting Provider   Otilio Owens MD    Attending Provider   Otilio Owens MD    Department, Room/Bed   [facility] 6B, N628/1       Discharge Date       Discharge Disposition       Discharge Destination                               Attending Provider: Otilio Owens MD    Allergies: No Known Allergies    Isolation: Contact Air   Infection: COVID (confirmed) (10/10/22)   Code Status: CPR    Ht: 172.7 cm (68\")   Wt: 120 kg (265 lb)    Admission Cmt: None   Principal Problem: Acute on chronic respiratory failure with hypoxia and hypercapnia (HCC) [J96.21,J96.22]                 Active Insurance as of 10/10/2022     Primary Coverage     Payor Plan Insurance Group Employer/Plan Group    [employer] [place] MED[place]PLAC[place] WELL[employer]ARE MEDICARE REPLACEMENT      Payor Plan Address Payor Plan Phone Number Payor Plan Fax Number Effective Dates    [address] [address] [phone]  9/1/2020 - None Entered    [facility] [address]       Subscriber Name Subscriber Birth Date Member ID       ADDY LEE 1965 [id number]           Secondary Coverage     Payor Plan Insurance Group Employer/Plan Group    [place]      Payor Plan Address Payor Plan Phone Number Payor Plan Fax Number Effective Dates    [address] [phone]  1/1/2020 - None Entered    [facility] [id number]       Subscriber Name Subscriber Birth Date Member ID       ADDY LEE 1965 [id number]                 Emergency Contacts      (Rel.) Home Phone Work Phone Mobile Phone    " "ANDREA AIKEN (Daughter) [phone] -- [phone]               History & Physical      Kishor Chapa III, DO at 10/10/22 1845              [facility] Medicine Services  HISTORY AND PHYSICAL    Patient Name: Jovanna Ching  : 1965  MRN: [id number]  Primary Care Physician: Lulu Love APRN  Date of admission: 10/10/2022      Subjective   Subjective     Chief Complaint:   Cough, shortness of breath    HPI:  Jovanna Ching is a 56 y.o. female who states that she began to feel better \"a week ago.\"  She specifically cites shortness of breath and cough as her initial symptoms.  She states that she came to the ER today because she had increased right toe pain, but nursing her PCP the office today, because of the cough and shortness of breath they suggested she come to the ER for further evaluation for that.  COVID test was positive here.  She also C/of dyspnea on exertion for the last 2 weeks and she feels like her chronically swollen bilateral lower extremities \"are a little worse\" over the last week.  She also confirms that she feels like she is wheezing for the last 24 hours.  Cough is nonproductive.  She denies chest pain, anosmia/ageusia, fever/chills, nausea/emesis, abdominal pain, bowel habit change, focal neurologic deficit beyond baseline, or syncope.  Medical history significant for hypertension, hyperlipidemia, asthma, COPD, type 2 diabetes.      Review of Systems   Constitutional: Negative.    HENT: Negative.    Eyes: Negative.    Respiratory: Positive for cough, shortness of breath and wheezing.    Cardiovascular: Positive for leg swelling.   Gastrointestinal: Negative.    Endocrine: Negative.    Genitourinary: Negative.    Musculoskeletal: Negative.    Skin: Negative.    Allergic/Immunologic: Negative.    Neurological: Negative.    Hematological: Negative.    Psychiatric/Behavioral: Negative.           Personal History     Past Medical History:   Diagnosis Date   • " Arthritis    • Asthma    • Broken leg    • CHF (congestive heart failure) ([facility])    • Chronic pain    • COPD (chronic obstructive pulmonary disease) ([facility])    • Diabetes mellitus (HCC)    • GERD (gastroesophageal reflux disease)    • Hypertension    • Swelling              Past Surgical History:   Procedure Laterality Date   • BACK SURGERY     •  SECTION     • VASCULAR SURGERY         Family History: Father had alcoholism.  She states she is otherwise unfamiliar with family medical history.    Social History:  reports that she has been smoking. She has a 32.00 pack-year smoking history. She has never used smokeless tobacco. She reports that she does not use drugs.  Social History     Social History Narrative   • Not on file       Medications:  Available home medication information reviewed.  (Not in a hospital admission)      No Known Allergies    Objective   Objective     Vital Signs:   Temp:  [98 °F (36.7 °C)] 98 °F (36.7 °C)  Heart Rate:  [] 84  Resp:  [20] 20  BP: (112-170)/(78-94) 112/94  Flow (L/min):  [4.5-5] 5       Physical Exam   Constitutional: Awake, alert, NAD, pleasant.  Eyes: PERRLA, sclerae anicteric, no conjunctival injection  HENT: NCAT, mucous membranes moist  Neck: Supple, no thyromegaly, no lymphadenopathy, trachea midline  Respiratory: Coarse breath sounds bilaterally, nonlabored respirations   Cardiovascular: RRR, no murmurs, rubs, or gallops, palpable pedal pulses bilaterally  Gastrointestinal: Positive bowel sounds, soft, nontender, nondistended  Musculoskeletal: 1+ bilateral ankle edema, no clubbing or cyanosis to extremities  Psychiatric: Appropriate affect, cooperative  Neurologic: Oriented x 3, strength symmetric in all extremities, Cranial Nerves grossly intact to confrontation, speech clear  Skin: No rashes, normal turgor.    Result Review:  I have personally reviewed the results from the time of this admission to 10/10/2022 18:45 EDT and agree with these findings:  [x]   Laboratory list / accordion  []  Microbiology  [x]  Radiology  [x]  EKG/Telemetry   []  Cardiology/Vascular   []  Pathology  []  Old records  []  Other:  Most notable findings include: On ABG, pH is 7.312, PCO2 71.3, PO2 62.5, possible venous gas.  D-dimer 1.03.  Reviewed all labs.  I personally reviewed chest x-ray which by my read shows mild increased vascular markings, some cephalization, cardiomegaly, likely mild edema.  No infiltrate.  CT chest is still pending at the time of this note.  I personally reviewed EKG which by my read shows normal sinus rhythm, normal axis, ventricular rate approximately 80 bpm, nonspecific ST/T wave changes.        LAB RESULTS:      Lab 10/10/22  1548   WBC 10.28   HEMOGLOBIN 12.6   HEMATOCRIT 41.6   PLATELETS 363   NEUTROS ABS 7.65*   IMMATURE GRANS (ABS) 0.08*   LYMPHS ABS 1.54   MONOS ABS 0.77   EOS ABS 0.20   MCV 88.3   D DIMER QUANT 1.03*         Lab 10/10/22  1548   SODIUM 137   POTASSIUM 4.9   CHLORIDE 97*   CO2 33.0*   ANION GAP 7.0   BUN 16   CREATININE 0.63   EGFR 103.0   GLUCOSE 163*   CALCIUM 9.1         Lab 10/10/22  1548   TOTAL PROTEIN 7.5   ALBUMIN 3.80   GLOBULIN 3.7   ALT (SGPT) 34*   AST (SGOT) 27   BILIRUBIN 0.3   ALK PHOS 147*         Lab 10/10/22  1548   PROBNP 161.4   TROPONIN T <0.010                 Lab 10/10/22  1643   PH, ARTERIAL 7.312*   PCO2, ARTERIAL 71.3*   PO2 ART 62.5*   FIO2 40   HCO3 ART 36.1*   BASE EXCESS ART 7.3*   CARBOXYHEMOGLOBIN 4.8*         Microbiology Results (last 10 days)     Procedure Component Value - Date/Time    COVID PRE-OP / PRE-PROCEDURE SCREENING ORDER (NO ISOLATION) - Swab, Nasopharynx [[id number]]  (Abnormal) Collected: 10/10/22 1551    Lab Status: Final result Specimen: Swab from Nasopharynx Updated: 10/10/22 1648    Narrative:      The following orders were created for panel order COVID PRE-OP / PRE-PROCEDURE SCREENING ORDER (NO ISOLATION) - Swab, Nasopharynx.  Procedure                               Abnormality          Status                     ---------                               -----------         ------                     COVID-19 and FLU A/B PCR...[[id number]]  Abnormal            Final result                 Please view results for these tests on the individual orders.    COVID-19 and FLU A/B PCR - Swab, Nasopharynx [[id number]]  (Abnormal) Collected: 10/10/22 1551    Lab Status: Final result Specimen: Swab from Nasopharynx Updated: 10/10/22 1648     COVID19 Detected     Influenza A PCR Not Detected     Influenza B PCR Not Detected    Narrative:      Fact sheet for providers: https://www.fda.gov/media/957348/download    Fact sheet for patients: https://www.fda.gov/media/725117/download    Test performed by PCR.  Influenza A and Influenza B negative results should be considered presumptive in samples that have a positive SARS-CoV-2 result.    Competitive inhibition studies showed that SARS-CoV-2 virus, when present at concentrations above 3.6E+04 copies/mL, can inhibit the detection and amplification of influenza A and influenza B virus RNA if present at or below 1.8E+02 copies/mL or 4.9E+02 copies/mL, respectively, and may lead to false negative influenza virus results. If co-infection with influenza A or influenza B virus is suspected in samples with a positive SARS-CoV-2 result, the sample should be re-tested with another FDA cleared, approved, or authorized influenza test, if influenza virus detection would change clinical management.          XR Chest 1 View    Result Date: 10/10/2022   DATE OF EXAM: 10/10/2022 3:32 PM  PROCEDURE: XR CHEST 1 VW-  INDICATIONS: CHF/COPD protocol  COMPARISON: 11/18/2021 and prior  TECHNIQUE: Portable Chest  FINDINGS:  Study limited by body habitus and overlying support and monitoring apparatus  The heart size is mildly enlarged. Pulmonary vascularity is congested and indistinct. Increased groundglass and interstitial opacities noted with a perihilar and bibasilar predominance.       Impression: IMPRESSION : Cardiomegaly with mild pulmonary vascular congestion, pulmonary edema.[  This report was finalized on 10/10/2022 3:43 PM by Adam Taylor.        Results for orders placed during the hospital encounter of 11/18/21    Adult Transthoracic Echo Complete w/ Color, Spectral and Contrast if necessary per protocol    Interpretation Summary  · Left ventricular ejection fraction appears to be 61 - 65%. Left ventricular systolic function is normal.  · Left ventricular diastolic function was normal.  · The right ventricular cavity is mildly dilated.  · Moderate tricuspid valve regurgitation is present.  · Estimated right ventricular systolic pressure from tricuspid regurgitation is mildly elevated (35-45 mmHg).  · Mild pulmonary hypertension is present.      Assessment & Plan   Assessment & Plan     Active Hospital Problems    Diagnosis  POA   • **Acute on chronic respiratory failure with hypoxia and hypercapnia (HCC) [J96.21, J96.22]  Yes   • COVID-19 virus detected [U07.1]  Yes       56F with covid 19, acute respiratory failure with hypoxia    COVID-19  Acute hypoxic respiratory failure  History of asthma  Acute exacerbation of COPD  - Start daily IV remdesivir.  - Start daily IV dexamethasone.  - Monitor laboratory biomarkers.  - As needed ProAir inhaler.  - Continue O2 currently via BiPAP, can change to nasal cannula when possible to maintain saturations >92%.  - Lovenox 1 mg/kg SQ twice daily (full dose) for anticoagulation.  - Hold home Anoro Ellipta inhaler.    Type 2 diabetes  - We will hold home metformin for now.  - Add Levemir plus SSI coverage.    Hypertension  - Continue home lisinopril.    Hyperlipidemia  - She states she has taken a statin in the past but is not sure if he does now; there is not a statin listed on her home medication list.  - Continue fenofibrate.    Tobacco abuse  - Cessation advised/counseled.  - She requests nicotine patch which will be ordered.        DVT  "prophylaxis: Lovenox as above, full dose      CODE STATUS: Full  Code Status and Medical Interventions:   Ordered at: 10/10/22 1840     Level Of Support Discussed With:    Patient     Code Status (Patient has no pulse and is not breathing):    CPR (Attempt to Resuscitate)     Medical Interventions (Patient has pulse or is breathing):    Full Support         Kishor Chapa III,   10/10/22    Electronically signed by Kishor Chapa III,  at 10/10/22 2012          Emergency Department Notes      Mundo Cortes MD at 10/10/22 1512      Procedure Orders    1. Critical Care [[id number]] ordered by Mundo Cortes MD                EMERGENCY DEPARTMENT ENCOUNTER    Pt Name: Jovanna Ching  MRN: [id number]  Pt :   1965  Room Number:    Date of encounter:  10/10/2022  PCP: Lulu Love APRN  ED Provider: Mundo Cortes MD    Historian: Patient      HPI:  Chief Complaint: Difficulty breathing        Context: Jovanna Ching is a 56 y.o. female who presents to the ED c/o difficulty breathing which is been ongoing for close to a week now.  She reports a mild cough but gradually increasing need for oxygen.  She is normally on 2 L of oxygen via nasal cannula and this is enough to keep her oxygen saturation in the 90s.  She continues to smoke but much less than her prior 1 pack/day.  She states that 1 pack will last her close to a week now.  The patient takes a \"water pill\" but did not take it today secondary to coming to the hospital and not wanting to have to urinate in route.  She notes that her lower extremities are becoming gradually more edematous than at her baseline.  She denies fevers, chills, nausea, vomiting.         PAST MEDICAL HISTORY  Past Medical History:   Diagnosis Date   • Arthritis    • Asthma    • Broken leg    • CHF (congestive heart failure) ([facility])    • Chronic pain    • COPD (chronic obstructive pulmonary disease) ([facility])    • Diabetes mellitus ([facility])    • GERD (gastroesophageal " reflux disease)    • Hypertension    • Swelling          PAST SURGICAL HISTORY  Past Surgical History:   Procedure Laterality Date   • BACK SURGERY     •  SECTION     • VASCULAR SURGERY           FAMILY HISTORY  Family History   Family history unknown: Yes         SOCIAL HISTORY  Social History     Socioeconomic History   • Marital status:    Tobacco Use   • Smoking status: Every Day     Packs/day: 1.00     Years: 32.00     Pack years: 32.00     Types: Cigarettes   • Smokeless tobacco: Never   • Tobacco comments:     DOWN TO ONE A DAY   Vaping Use   • Vaping Use: Never used   Substance and Sexual Activity   • Drug use: Never   • Sexual activity: Defer         ALLERGIES  Patient has no known allergies.        REVIEW OF SYSTEMS  Review of Systems       All systems reviewed and negative except for those discussed in HPI.       PHYSICAL EXAM    I have reviewed the triage vital signs and nursing notes.    ED Triage Vitals [10/10/22 1240]   Temp Heart Rate Resp BP SpO2   98 °F (36.7 °C) 106 20 170/80 (!) 88 %      Temp src Heart Rate Source Patient Position BP Location FiO2 (%)   -- Monitor Sitting Left arm --       Physical Exam  GENERAL:   Appears nontoxic.  She has oxygen on at 5 L/min with oxygen saturations in the low to mid 90s.  HENT: Nares patent.  EYES: No scleral icterus.  CV: Regular rhythm, tachycardic rate.  No murmurs gallops rubs  RESPIRATORY: Diminished breath sounds in all lung fields with distant breath sounds from body habitus.  Additionally the expiratory wheezes are heard in upper and mid lung fields.  No accessory muscle use or retractions.  Able to speak in relatively full sentences.  ABDOMEN: Soft, nontender  MUSCULOSKELETAL: No deformities.   NEURO: Alert, moves all extremities, follows commands.  SKIN: Warm, dry, no rash visualized.        LAB RESULTS  Recent Results (from the past 24 hour(s))   ECG 12 Lead    Collection Time: 10/10/22  1:40 PM   Result Value Ref Range    QT  Interval 370 ms    QTC Interval 437 ms   Comprehensive Metabolic Panel    Collection Time: 10/10/22  3:48 PM    Specimen: Blood   Result Value Ref Range    Glucose 163 (H) 65 - 99 mg/dL    BUN 16 6 - 20 mg/dL    Creatinine 0.63 0.57 - 1.00 mg/dL    Sodium 137 136 - 145 mmol/L    Potassium 4.9 3.5 - 5.2 mmol/L    Chloride 97 (L) 98 - 107 mmol/L    CO2 33.0 (H) 22.0 - 29.0 mmol/L    Calcium 9.1 8.6 - 10.5 mg/dL    Total Protein 7.5 6.0 - 8.5 g/dL    Albumin 3.80 3.50 - 5.20 g/dL    ALT (SGPT) 34 (H) 1 - 33 U/L    AST (SGOT) 27 1 - 32 U/L    Alkaline Phosphatase 147 (H) 39 - 117 U/L    Total Bilirubin 0.3 0.0 - 1.2 mg/dL    Globulin 3.7 gm/dL    A/G Ratio 1.0 g/dL    BUN/Creatinine Ratio 25.4 (H) 7.0 - 25.0    Anion Gap 7.0 5.0 - 15.0 mmol/L    eGFR 103.0 >60.0 mL/min/1.73   BNP    Collection Time: 10/10/22  3:48 PM    Specimen: Blood   Result Value Ref Range    proBNP 161.4 0.0 - 900.0 pg/mL   Troponin    Collection Time: 10/10/22  3:48 PM    Specimen: Blood   Result Value Ref Range    Troponin T <0.010 0.000 - 0.030 ng/mL   Green Top (Gel)    Collection Time: 10/10/22  3:48 PM   Result Value Ref Range    Extra Tube Hold for add-ons.    Lavender Top    Collection Time: 10/10/22  3:48 PM   Result Value Ref Range    Extra Tube hold for add-on    Gold Top - SST    Collection Time: 10/10/22  3:48 PM   Result Value Ref Range    Extra Tube Hold for add-ons.    Light Blue Top    Collection Time: 10/10/22  3:48 PM   Result Value Ref Range    Extra Tube Hold for add-ons.    CBC Auto Differential    Collection Time: 10/10/22  3:48 PM    Specimen: Blood   Result Value Ref Range    WBC 10.28 3.40 - 10.80 10*3/mm3    RBC 4.71 3.77 - 5.28 10*6/mm3    Hemoglobin 12.6 12.0 - 15.9 g/dL    Hematocrit 41.6 34.0 - 46.6 %    MCV 88.3 79.0 - 97.0 fL    MCH 26.8 26.6 - 33.0 pg    MCHC 30.3 (L) 31.5 - 35.7 g/dL    RDW 14.9 12.3 - 15.4 %    RDW-SD 48.3 37.0 - 54.0 fl    MPV 10.4 6.0 - 12.0 fL    Platelets 363 140 - 450 10*3/mm3    Neutrophil  % 74.4 42.7 - 76.0 %    Lymphocyte % 15.0 (L) 19.6 - 45.3 %    Monocyte % 7.5 5.0 - 12.0 %    Eosinophil % 1.9 0.3 - 6.2 %    Basophil % 0.4 0.0 - 1.5 %    Immature Grans % 0.8 (H) 0.0 - 0.5 %    Neutrophils, Absolute 7.65 (H) 1.70 - 7.00 10*3/mm3    Lymphocytes, Absolute 1.54 0.70 - 3.10 10*3/mm3    Monocytes, Absolute 0.77 0.10 - 0.90 10*3/mm3    Eosinophils, Absolute 0.20 0.00 - 0.40 10*3/mm3    Basophils, Absolute 0.04 0.00 - 0.20 10*3/mm3    Immature Grans, Absolute 0.08 (H) 0.00 - 0.05 10*3/mm3    nRBC 0.0 0.0 - 0.2 /100 WBC   D-dimer, Quantitative    Collection Time: 10/10/22  3:48 PM    Specimen: Blood   Result Value Ref Range    D-Dimer, Quantitative 1.03 (H) 0.01 - 0.50 MCGFEU/mL   COVID-19 and FLU A/B PCR - Swab, Nasopharynx    Collection Time: 10/10/22  3:51 PM    Specimen: Nasopharynx; Swab   Result Value Ref Range    COVID19 Detected (C) Not Detected - Ref. Range    Influenza A PCR Not Detected Not Detected    Influenza B PCR Not Detected Not Detected   Blood Gas, Arterial With Co-Ox    Collection Time: 10/10/22  4:43 PM    Specimen: Arterial Blood   Result Value Ref Range    Site Right Radial     Dat's Test N/A     pH, Arterial 7.312 (L) 7.350 - 7.450 pH units    pCO2, Arterial 71.3 (C) 35.0 - 45.0 mm Hg    pO2, Arterial 62.5 (L) 83.0 - 108.0 mm Hg    HCO3, Arterial 36.1 (H) 20.0 - 26.0 mmol/L    Base Excess, Arterial 7.3 (H) 0.0 - 2.0 mmol/L    Hemoglobin, Blood Gas 13.3 (L) 14 - 18 g/dL    Hematocrit, Blood Gas 40.7 38.0 - 51.0 %    Oxyhemoglobin 85.3 (L) 94 - 99 %    Methemoglobin 0.40 0.00 - 1.50 %    Carboxyhemoglobin 4.8 (H) 0 - 2 %    CO2 Content 38.3 (H) 22 - 33 mmol/L    Temperature 37.0 C    Barometric Pressure for Blood Gas      Modality Nasal Cannula     FIO2 40 %    Rate 0 Breaths/minute    PIP 0 cmH2O    IPAP 0     EPAP 0     Note      Notified Who MD MODESTO     Notified By [id number]     Notified Time 10/10/2022 16:42     pH, Temp Corrected 7.312 pH Units    pCO2, Temperature  Corrected 71.3 (H) 35 - 45 mm Hg    pO2, Temperature Corrected 62.5 (L) 83 - 108 mm Hg       If labs were ordered, I independently reviewed the results.        RADIOLOGY  XR Chest 1 View    Result Date: 10/10/2022   DATE OF EXAM: 10/10/2022 3:32 PM  PROCEDURE: XR CHEST 1 VW-  INDICATIONS: CHF/COPD protocol  COMPARISON: 11/18/2021 and prior  TECHNIQUE: Portable Chest  FINDINGS:  Study limited by body habitus and overlying support and monitoring apparatus  The heart size is mildly enlarged. Pulmonary vascularity is congested and indistinct. Increased groundglass and interstitial opacities noted with a perihilar and bibasilar predominance.      IMPRESSION : Cardiomegaly with mild pulmonary vascular congestion, pulmonary edema.[  This report was finalized on 10/10/2022 3:43 PM by Adam Taylor.        I ordered and reviewed the above noted radiographic studies.      I viewed images of chest x-ray which showed no infiltrate per my independent interpretation.    See radiologist's dictation for official interpretation.        PROCEDURES    Critical Care  Performed by: Mundo Cortes MD  Authorized by: Mundo Cortes MD     Critical care provider statement:     Critical care time (minutes):  35    Critical care time was exclusive of:  Separately billable procedures and treating other patients    Critical care was necessary to treat or prevent imminent or life-threatening deterioration of the following conditions:  Respiratory failure    Critical care was time spent personally by me on the following activities:  Ordering and performing treatments and interventions, ordering and review of laboratory studies, ordering and review of radiographic studies, pulse oximetry, re-evaluation of patient's condition, review of old charts, obtaining history from patient or surrogate, examination of patient, evaluation of patient's response to treatment, discussions with consultants and development of treatment plan with patient or  surrogate        ECG 12 Lead   Final Result   Test Reason : SOB   Blood Pressure :   */*   mmHG   Vent. Rate :  84 BPM     Atrial Rate :  84 BPM      P-R Int : 140 ms          QRS Dur :  84 ms       QT Int : 370 ms       P-R-T Axes :  63  63  70 degrees      QTc Int : 437 ms      Normal sinus rhythm   Cannot rule out Anterior infarct (cited on or before 18-NOV-2021)   Abnormal ECG   When compared with ECG of 18-NOV-2021 17:17,   No significant change was found   Confirmed by MARCIAL HUGHES MD ([id number]) on 10/10/2022 2:39:32 PM      Referred By: EDMD           Confirmed By: MARCIAL HUGHES MD      ECG 12 Lead    (Results Pending)       MEDICATIONS GIVEN IN ER    Medications   sodium chloride 0.9 % flush 10 mL (has no administration in time range)   albuterol sulfate HFA (PROVENTIL HFA;VENTOLIN HFA;PROAIR HFA) inhaler 2 puff (has no administration in time range)   methylPREDNISolone sodium succinate (SOLU-Medrol) injection 80 mg (has no administration in time range)   furosemide (LASIX) injection 80 mg (80 mg Intravenous Given 10/10/22 1552)         PROGRESS, DATA ANALYSIS, CONSULTS, AND MEDICAL DECISION MAKING    All labs have been independently reviewed by me.  All radiology studies have been reviewed by me and the radiologist dictating the report.   EKG's have been independently viewed and interpreted by me.      Differential diagnoses: Dyspnea with hypoxia.  Respiratory failure.  CHF versus restrictive lung disease secondary to body habitus versus COVID, PE, asthma exacerbation, etc.      ED Course as of 10/10/22 1806   Mon Oct 10, 2022   1519 We had not initially checked and oxygen saturation on her baseline 2 L.    81% on baseline 2 liters per min.  At this time.    ABG pending. [MS]   1718 I have patient on BiPAP and she is tolerating this well.  We have administered a DuoNeb.  Given the patient's chest x-ray findings along with her CHF history, lack of Lasix today, I have given her Lasix 80 mg IV. [MS]   1719 I have  contacted Dr. Chapa, hospitalist for admission. [MS]   1720 The patient is diabetic but given risk benefit I have chosen to administer Solu-Medrol 80 mg IV. [MS]      ED Course User Index  [MS] Mundo Cortes MD             AS OF 18:06 EDT VITALS:    BP - 136/87  HR - 83  TEMP - 98 °F (36.7 °C)  O2 SATS - 93%                  DIAGNOSIS  Final diagnoses:   Acute on chronic respiratory failure with hypoxia and hypercapnia (HCC)   Exacerbation of asthma, unspecified asthma severity, unspecified whether persistent   COVID-19 virus infection         DISPOSITION  Admission           Mundo Cortes MD  10/10/22 2026      Electronically signed by Mundo Cortes MD at 10/10/22 2026       Vital Signs (last 2 days)     Date/Time Temp Temp src Pulse Resp BP Patient Position SpO2    10/11/22 0800 -- -- 74 -- -- -- 94    10/11/22 0700 98 (36.7) Oral 74 20 127/77 Sitting 93    10/11/22 0344 98.3 (36.8) Oral 86 18 112/84 Lying 90    10/10/22 2335 98.8 (37.1) Oral 92 18 127/58 Sitting 90    10/10/22 2046 99.2 (37.3) Oral 88 -- 133/70 Lying 90    10/10/22 1947 -- -- 88 20 -- -- 96    10/10/22 18:20:56 -- -- 84 -- -- -- --    10/10/22 1800 -- -- -- -- 112/94 -- 96    10/10/22 16:04:51 -- -- -- -- -- -- 93    10/10/22 15:13:09 -- -- 83 -- -- -- --    10/10/22 1511 -- -- -- -- -- -- 93    10/10/22 1500 -- -- -- -- 136/87 -- 92    10/10/22 14:16:56 -- -- 86 -- -- -- 92    10/10/22 1411 -- -- -- -- 130/78 -- 92    10/10/22 1410 -- -- -- -- -- -- 94    10/10/22 1240 98 (36.7) -- 106 20 170/80 Sitting 88        Oxygen Therapy (last 2 days)     Date/Time SpO2 Device (Oxygen Therapy) Flow (L/min) Oxygen Concentration (%) ETCO2 (mmHg)    10/11/22 0800 94 -- -- -- --    10/11/22 0700 93 humidified;nasal cannula 6 -- --    10/11/22 0600 -- humidified;nasal cannula 6 -- --    10/11/22 0400 -- NPPV/NIV -- -- --    10/11/22 0344 90 humidified;nasal cannula 6 -- --    10/11/22 0200 -- NPPV/NIV -- -- --    10/11/22 0000 -- humidified;nasal  cannula 6 -- --    10/10/22 2335 90 NPPV/NIV -- -- --    10/10/22 2200 -- nasal cannula 6 -- --    10/10/22 2046 90 nasal cannula 6 -- --    10/10/22 1947 96 nasal cannula 6 -- --    10/10/22 1800 96 -- -- -- --    10/10/22 16:04:51 93 nasal cannula 5 -- --    10/10/22 1511 93 -- -- -- --    10/10/22 1500 92 -- -- -- --    10/10/22 14:16:56 92 nasal cannula 4.5 -- --    10/10/22 1411 92 -- -- -- --    10/10/22 1410 94 -- -- -- --    10/10/22 1240 88 -- -- -- --          Current Facility-Administered Medications   Medication Dose Route Frequency Provider Last Rate Last Admin   • acetaminophen (TYLENOL) tablet 650 mg  650 mg Oral Q4H PRN Kishor Chapa III, DO   650 mg at 10/11/22 0347   • albuterol sulfate HFA (PROVENTIL HFA;VENTOLIN HFA;PROAIR HFA) inhaler 2 puff  2 puff Inhalation Q6H PRN Kishor Chapa III, DO       • dexamethasone (DECADRON) tablet 6 mg  6 mg Oral Daily Kishor Chapa III, DO   6 mg at 10/11/22 0911    Or   • dexamethasone (DECADRON) injection 6 mg  6 mg Intravenous Daily Kishor Chapa III, DO       • dextrose (D50W) (25 g/50 mL) IV injection 25 g  25 g Intravenous Q15 Min PRN Kishor Chapa III, DO       • dextrose (GLUTOSE) oral gel 15 g  15 g Oral Q15 Min PRN Kishor Chapa III, DO       • Enoxaparin Sodium (LOVENOX) syringe 120 mg  1 mg/kg Subcutaneous Q12H Kishor Chapa III, DO   120 mg at 10/11/22 0910   • fenofibrate (TRICOR) tablet 145 mg  145 mg Oral Daily Kishor Chapa III, DO   145 mg at 10/11/22 0911   • glucagon (human recombinant) (GLUCAGEN DIAGNOSTIC) injection 1 mg  1 mg Intramuscular Q15 Min PRN Kishor Chapa III, DO       • HYDROcodone-acetaminophen (NORCO) 5-325 MG per tablet 1 tablet  1 tablet Oral Q4H PRN Kishor Chapa III, DO       • insulin regular (humuLIN R,novoLIN R) injection 0-7 Units  0-7 Units Subcutaneous Q6H Kishor Chpaa III, DO   5 Units at 10/10/22 1290   •  lisinopril (PRINIVIL,ZESTRIL) tablet 20 mg  20 mg Oral Daily Kishor Chapa III, DO   20 mg at 10/11/22 0911   • nicotine (NICODERM CQ) 21 MG/24HR patch 1 patch  1 patch Transdermal Q24H Kishor Chapa III, DO   1 patch at 10/11/22 0913   • ondansetron (ZOFRAN) injection 4 mg  4 mg Intravenous Q6H PRN Kishor Chapa III, DO       • Pharmacy Consult - Remdesivir   Does not apply Continuous PRN Kishor Chapa III, DO       • remdesivir 100 mg in sodium chloride 0.9 % 250 mL IVPB (powder vial)  100 mg Intravenous Daily With Lunch Kishor Chapa III, DO       • sodium chloride 0.9 % flush 10 mL  10 mL Intravenous PRN Mundo Cortes MD       • sodium chloride 0.9 % flush 10 mL  10 mL Intravenous Q12H Kishor Chapa III, DO   10 mL at 10/11/22 0912   • sodium chloride 0.9 % flush 10 mL  10 mL Intravenous PRN Kishor Chapa III, DO       • traZODone (DESYREL) tablet 50 mg  50 mg Oral Nightly Kishor Chapa III, DO   50 mg at 10/10/22 2103     Lab Results (last 48 hours)     Procedure Component Value Units Date/Time    POC Glucose Once [[id number]]  (Abnormal) Collected: 10/11/22 0731    Specimen: Blood Updated: 10/11/22 0733     Glucose 142 mg/dL      Comment: Meter: [id number] : [id number] [place]       Bilirubin, Direct [[id number]]  (Normal) Collected: 10/11/22 0628    Specimen: Blood Updated: 10/11/22 0719     Bilirubin, Direct <0.2 mg/dL      Comment: Specimen hemolyzed. Results may be affected.       Lactate Dehydrogenase [[id number]]  (Abnormal) Collected: 10/11/22 0628    Specimen: Blood Updated: 10/11/22 0718      U/L     Comprehensive Metabolic Panel [[id number]]  (Abnormal) Collected: 10/11/22 0628    Specimen: Blood Updated: 10/11/22 0717     Glucose 159 mg/dL      BUN 15 mg/dL      Creatinine 0.60 mg/dL      Sodium 134 mmol/L      Potassium 4.9 mmol/L      Comment: Slight hemolysis detected by analyzer. Results may be  affected.        Chloride 93 mmol/L      CO2 30.0 mmol/L      Calcium 8.9 mg/dL      Total Protein 7.0 g/dL      Albumin 3.70 g/dL      ALT (SGPT) 29 U/L      AST (SGOT) 16 U/L      Alkaline Phosphatase 130 U/L      Total Bilirubin 0.2 mg/dL      Globulin 3.3 gm/dL      Comment: Calculated Result        A/G Ratio 1.1 g/dL      BUN/Creatinine Ratio 25.0     Anion Gap 11.0 mmol/L      eGFR 104.2 mL/min/1.73      Comment: National Kidney Foundation and American Society of Nephrology (ASN) Task Force recommended calculation based on the Chronic Kidney Disease Epidemiology Collaboration (CKD-EPI) equation refit without adjustment for race.       Narrative:      GFR Normal >60  Chronic Kidney Disease <60  Kidney Failure <15      Magnesium [[id number]]  (Normal) Collected: 10/11/22 0628    Specimen: Blood Updated: 10/11/22 0717     Magnesium 2.2 mg/dL     CBC Auto Differential [[id number]]  (Abnormal) Collected: 10/11/22 0628    Specimen: Blood Updated: 10/11/22 0655     WBC 10.65 10*3/mm3      RBC 4.77 10*6/mm3      Hemoglobin 12.8 g/dL      Hematocrit 42.6 %      MCV 89.3 fL      MCH 26.8 pg      MCHC 30.0 g/dL      RDW 14.6 %      RDW-SD 48.1 fl      MPV 9.7 fL      Platelets 357 10*3/mm3      Neutrophil % 88.6 %      Lymphocyte % 6.8 %      Monocyte % 3.6 %      Eosinophil % 0.0 %      Basophil % 0.2 %      Immature Grans % 0.8 %      Neutrophils, Absolute 9.45 10*3/mm3      Lymphocytes, Absolute 0.72 10*3/mm3      Monocytes, Absolute 0.38 10*3/mm3      Eosinophils, Absolute 0.00 10*3/mm3      Basophils, Absolute 0.02 10*3/mm3      Immature Grans, Absolute 0.08 10*3/mm3      nRBC 0.0 /100 WBC     POC Glucose Once [[id number]]  (Abnormal) Collected: 10/10/22 2200    Specimen: Blood Updated: 10/10/22 2201     Glucose 317 mg/dL      Comment: Meter: [id number] : [id number] Phaniautumn Kowalski       Ferritin [[id number]]  (Abnormal) Collected: 10/10/22 1548    Specimen: Blood Updated: 10/10/22 2004     Ferritin 229.40 ng/mL      Narrative:      Results may be falsely decreased if patient taking Biotin.      [place] Draw [[id number]] Collected: 10/10/22 1548    Specimen: Blood Updated: 10/10/22 2000    Narrative:      The following orders were created for panel order [place] Draw.  Procedure                               Abnormality         Status                     ---------                               -----------         ------                     Green Top (Gel)[[id number]]                                  Final result               Lavender Top[[id number]]                                     Final result               Gold Top - SST[[id number]]                                   Final result               Gray Top[[id number]]                                         Final result               Light Blue Top[[id number]]                                   Final result                 Please view results for these tests on the individual orders.    Gray Top [[id number]] Collected: 10/10/22 1548    Specimen: Blood Updated: 10/10/22 2000     Extra Tube Hold for add-ons.     Comment: Auto resulted.       Lactate Dehydrogenase [[id number]]  (Abnormal) Collected: 10/10/22 1548    Specimen: Blood Updated: 10/10/22 1937      U/L     C-reactive Protein [[id number]]  (Abnormal) Collected: 10/10/22 1548    Specimen: Blood Updated: 10/10/22 1932     C-Reactive Protein 6.37 mg/dL     D-dimer, Quantitative [[id number]]  (Abnormal) Collected: 10/10/22 1548    Specimen: Blood Updated: 10/10/22 1703     D-Dimer, Quantitative 1.03 MCGFEU/mL     Narrative:      The D-Dimer assay test is to be used in conjunction with a clinical pretest probability (PTP) assessment model, and has been approved by the FDA to exclude pulmonary embolism (PE) and deep venous thrombosis (DVT) in outpatients suspected of PE or DVT, with a cutoff of 0.5 MCGFEU/mL.    Gold Top - SST [[id number]] Collected: 10/10/22 1548    Specimen: Blood Updated: 10/10/22 1700     Extra Tube Hold for  add-ons.     Comment: Auto resulted.       Light Blue Top [[id number]] Collected: 10/10/22 1548    Specimen: Blood Updated: 10/10/22 1700     Extra Tube Hold for add-ons.     Comment: Auto resulted       Green Top (Gel) [[id number]] Collected: 10/10/22 1548    Specimen: Blood Updated: 10/10/22 1700     Extra Tube Hold for add-ons.     Comment: Auto resulted.       Lavender Top [[id number]] Collected: 10/10/22 1548    Specimen: Blood Updated: 10/10/22 1700     Extra Tube hold for add-on     Comment: Auto resulted       COVID PRE-OP / PRE-PROCEDURE SCREENING ORDER (NO ISOLATION) - Swab, Nasopharynx [[id number]]  (Abnormal) Collected: 10/10/22 1551    Specimen: Swab from Nasopharynx Updated: 10/10/22 1648    Narrative:      The following orders were created for panel order COVID PRE-OP / PRE-PROCEDURE SCREENING ORDER (NO ISOLATION) - Swab, Nasopharynx.  Procedure                               Abnormality         Status                     ---------                               -----------         ------                     COVID-19 and FLU A/B PCR...[[id number]]  Abnormal            Final result                 Please view results for these tests on the individual orders.    COVID-19 and FLU A/B PCR - Swab, Nasopharynx [[id number]]  (Abnormal) Collected: 10/10/22 1551    Specimen: Swab from Nasopharynx Updated: 10/10/22 1648     COVID19 Detected     Influenza A PCR Not Detected     Influenza B PCR Not Detected    Narrative:      Fact sheet for providers: https://www.fda.gov/media/360761/download    Fact sheet for patients: https://www.fda.gov/media/507868/download    Test performed by PCR.  Influenza A and Influenza B negative results should be considered presumptive in samples that have a positive SARS-CoV-2 result.    Competitive inhibition studies showed that SARS-CoV-2 virus, when present at concentrations above 3.6E+04 copies/mL, can inhibit the detection and amplification of influenza A and influenza B virus RNA if  present at or below 1.8E+02 copies/mL or 4.9E+02 copies/mL, respectively, and may lead to false negative influenza virus results. If co-infection with influenza A or influenza B virus is suspected in samples with a positive SARS-CoV-2 result, the sample should be re-tested with another FDA cleared, approved, or authorized influenza test, if influenza virus detection would change clinical management.    Blood Gas, Arterial With Co-Ox [[id number]]  (Abnormal) Collected: 10/10/22 1643    Specimen: Arterial Blood Updated: 10/10/22 1643     Site Right Radial     Dat's Test N/A     pH, Arterial 7.312 pH units      Comment: 84 Value below reference range        pCO2, Arterial 71.3 mm Hg      Comment: 86 Value above critical limit        pO2, Arterial 62.5 mm Hg      Comment: 84 Value below reference range        HCO3, Arterial 36.1 mmol/L      Base Excess, Arterial 7.3 mmol/L      Hemoglobin, Blood Gas 13.3 g/dL      Hematocrit, Blood Gas 40.7 %      Oxyhemoglobin 85.3 %      Comment: 84 Value below reference range        Methemoglobin 0.40 %      Carboxyhemoglobin 4.8 %      Comment: 83 Value above reference range        CO2 Content 38.3 mmol/L      Temperature 37.0 C      Barometric Pressure for Blood Gas --     Comment: N/A        Modality Nasal Cannula     FIO2 40 %      Rate 0 Breaths/minute      PIP 0 cmH2O      Comment: Meter: [id number]     :  [id number]        IPAP 0     EPAP 0     Note --     Notified Who MD MODESTO     Notified By [id number]     Notified Time 10/10/2022 16:42     pH, Temp Corrected 7.312 pH Units      pCO2, Temperature Corrected 71.3 mm Hg      pO2, Temperature Corrected 62.5 mm Hg     Troponin [[id number]]  (Normal) Collected: 10/10/22 1548    Specimen: Blood Updated: 10/10/22 1627     Troponin T <0.010 ng/mL      Comment: Specimen hemolyzed.  Results may be affected.       Narrative:      Troponin T Reference Range:  <= 0.03 ng/mL-   Negative for AMI  >0.03 ng/mL-     Abnormal for  myocardial necrosis.  Clinicians would have to utilize clinical acumen, EKG, Troponin and serial changes to determine if it is an Acute Myocardial Infarction or myocardial injury due to an underlying chronic condition.       Results may be falsely decreased if patient taking Biotin.      BNP [[id number]]  (Normal) Collected: 10/10/22 1548    Specimen: Blood Updated: 10/10/22 1625     proBNP 161.4 pg/mL     Narrative:      Among patients with dyspnea, NT-proBNP is highly sensitive for the detection of acute congestive heart failure. In addition NT-proBNP of <300 pg/ml effectively rules out acute congestive heart failure with 99% negative predictive value.    Results may be falsely decreased if patient taking Biotin.      Comprehensive Metabolic Panel [[id number]]  (Abnormal) Collected: 10/10/22 1548    Specimen: Blood Updated: 10/10/22 1619     Glucose 163 mg/dL      BUN 16 mg/dL      Creatinine 0.63 mg/dL      Sodium 137 mmol/L      Potassium 4.9 mmol/L      Comment: Specimen hemolyzed.  Results may be affected.        Chloride 97 mmol/L      CO2 33.0 mmol/L      Calcium 9.1 mg/dL      Total Protein 7.5 g/dL      Albumin 3.80 g/dL      ALT (SGPT) 34 U/L      Comment: Specimen hemolyzed.  Results may be affected.        AST (SGOT) 27 U/L      Alkaline Phosphatase 147 U/L      Total Bilirubin 0.3 mg/dL      Globulin 3.7 gm/dL      Comment: Calculated Result        A/G Ratio 1.0 g/dL      BUN/Creatinine Ratio 25.4     Anion Gap 7.0 mmol/L      eGFR 103.0 mL/min/1.73      Comment: National Kidney Foundation and American Society of Nephrology (ASN) Task Force recommended calculation based on the Chronic Kidney Disease Epidemiology Collaboration (CKD-EPI) equation refit without adjustment for race.       Narrative:      GFR Normal >60  Chronic Kidney Disease <60  Kidney Failure <15      CBC & Differential [[id number]]  (Abnormal) Collected: 10/10/22 1548    Specimen: Blood Updated: 10/10/22 4109    Narrative:      The  following orders were created for panel order CBC & Differential.  Procedure                               Abnormality         Status                     ---------                               -----------         ------                     CBC Auto Differential[[id number]]        Abnormal            Final result                 Please view results for these tests on the individual orders.    CBC Auto Differential [[id number]]  (Abnormal) Collected: 10/10/22 1548    Specimen: Blood Updated: 10/10/22 1559     WBC 10.28 10*3/mm3      RBC 4.71 10*6/mm3      Hemoglobin 12.6 g/dL      Hematocrit 41.6 %      MCV 88.3 fL      MCH 26.8 pg      MCHC 30.3 g/dL      RDW 14.9 %      RDW-SD 48.3 fl      MPV 10.4 fL      Platelets 363 10*3/mm3      Neutrophil % 74.4 %      Lymphocyte % 15.0 %      Monocyte % 7.5 %      Eosinophil % 1.9 %      Basophil % 0.4 %      Immature Grans % 0.8 %      Neutrophils, Absolute 7.65 10*3/mm3      Lymphocytes, Absolute 1.54 10*3/mm3      Monocytes, Absolute 0.77 10*3/mm3      Eosinophils, Absolute 0.20 10*3/mm3      Basophils, Absolute 0.04 10*3/mm3      Immature Grans, Absolute 0.08 10*3/mm3      nRBC 0.0 /100 WBC           Imaging Results (Last 48 Hours)     Procedure Component Value Units Date/Time    CT Angiogram Chest [[id number]] Collected: 10/10/22 2000     Updated: 10/10/22 2011    Narrative:      DATE OF EXAM: 10/10/2022 7:25 PM     PROCEDURE: CT ANGIOGRAM CHEST-     INDICATIONS: Respiratory failure, positive D-dimer; J96.21-Acute and  chronic respiratory failure with hypoxia; J96.22-Acute and chronic  respiratory failure with hypercapnia; J45.901-Unspecified asthma with  (acute) exacerbation; U07.1-COVID-19     COMPARISON: CTA chest 11/18/2021     TECHNIQUE: Contiguous axial imaging was obtained from the thoracic inlet  through the upper abdomen following the intravenous administration of 85  mL of Isovue 370. Reconstructed coronal and sagittal images were also  obtained. Automated  exposure control and iterative reconstruction  methods were used. Rotational 3-D MIP reformat of the pulmonary arterial  vasculature was created at independent workstation.     The radiation dose reduction device was turned on for each scan per the  ALARA (As Low as Reasonably Achievable) protocol.     FINDINGS:     Normal appearance of the pulmonary arteries without filling defect to  suggest pulmonary embolism. Mild ectasia of the thoracic aorta.  Unremarkable appearance of the cardiac chambers. No pericardial  effusion. No significant coronary artery calcifications. There are  shotty mediastinal lymph nodes without bulky or clearly pathologic  mediastinal or hilar adenopathy. Redemonstration of heterogeneous  multinodular thyroid gland. The chest wall soft tissues are normal. No  axillary lymphadenopathy. The trachea and mainstem bronchi are patent.  The smaller peripheral airways are unremarkable. There is moderate  centrilobular and paraseptal emphysema, worst in the upper lobes. No  focal consolidation or evidence of active infectious or inflammatory  process. No lung mass. There is a subpleural 4 mm solid noncalcified  nodule in the left lung base (series 6 image 82). No acute osseous  findings. Similar diffuse thickening of the left adrenal gland with  otherwise unremarkable appearance of the partially imaged upper abdomen.       Impression:      No evidence of pulmonary embolism. No acute intrathoracic findings.  Redemonstration of emphysema.     Incidental note of 4 mm solid noncalcified subpleural nodule in the left  lower lobe. Per Fleischner criteria, in a high risk patient, CT of the  chest in one year can be performed for follow-up.     This report was finalized on 10/10/2022 8:08 PM by Kar Andrews MD.       XR Chest 1 View [[id number]] Collected: 10/10/22 1541     Updated: 10/10/22 1546    Narrative:         DATE OF EXAM:   10/10/2022 3:32 PM     PROCEDURE:   XR CHEST 1 VW-     INDICATIONS:    CHF/COPD protocol     COMPARISON:  11/18/2021 and prior     TECHNIQUE:   Portable Chest     FINDINGS:      Study limited by body habitus and overlying support and monitoring  apparatus     The heart size is mildly enlarged. Pulmonary vascularity is congested  and indistinct. Increased groundglass and interstitial opacities noted  with a perihilar and bibasilar predominance.        Impression:      IMPRESSION :   Cardiomegaly with mild pulmonary vascular congestion, pulmonary edema.[     This report was finalized on 10/10/2022 3:43 PM by Adam Taylor.           ECG/EMG Results (last 48 hours)     Procedure Component Value Units Date/Time    ECG 12 Lead [[id number]] Collected: 10/10/22 1340     Updated: 10/10/22 1439     QT Interval 370 ms      QTC Interval 437 ms     Narrative:      Test Reason : SOB  Blood Pressure :   */*   mmHG  Vent. Rate :  84 BPM     Atrial Rate :  84 BPM     P-R Int : 140 ms          QRS Dur :  84 ms      QT Int : 370 ms       P-R-T Axes :  63  63  70 degrees     QTc Int : 437 ms    Normal sinus rhythm  Cannot rule out Anterior infarct (cited on or before 18-NOV-2021)  Abnormal ECG  When compared with ECG of 18-NOV-2021 17:17,  No significant change was found  Confirmed by MARCIAL HUGHES MD ([id number]) on 10/10/2022 2:39:32 PM    Referred By: EDMD           Confirmed By: MARCIAL HUGHES MD

## 2022-10-11 NOTE — CASE MANAGEMENT/SOCIAL WORK
Discharge Planning Assessment  [facility]     Patient Name: Jovanna Ching  MRN: [id number]  Today's Date: 10/11/2022    Admit Date: 10/10/2022    Plan: Home   Discharge Needs Assessment     Row Name 10/11/22 1443       Living Environment    People in Home parent(s);child(tobias), adult    Current Living Arrangements home    Primary Care Provided by self    Provides Primary Care For no one    Family Caregiver if Needed child(tobias), adult    Quality of Family Relationships supportive    Able to Return to Prior Arrangements yes       Resource/Environmental Concerns    Resource/Environmental Concerns none    Transportation Concerns none       Transition Planning    Patient/Family Anticipates Transition to home    Patient/Family Anticipated Services at Transition none    Transportation Anticipated family or friend will provide       Discharge Needs Assessment    Readmission Within the Last 30 Days no previous admission in last 30 days    Equipment Currently Used at Home walker, rolling;oxygen    Concerns to be Addressed denies needs/concerns at this time    Anticipated Changes Related to Illness none               Discharge Plan     Row Name 10/11/22 1446       Plan    Plan Home    Patient/Family in Agreement with Plan yes    Plan Comments Spoke with daughter, Amanda, over telephone.  Daughter confirms patient lives in [place] [facility] with her mother and daughter; she confirms insurance as [employer] [place] [employer] and Lulu Love, PCP.  Daughter confirms rolling walker and home oxygen provided by [facility] and denies HH.  Daughter denies needs at this time.  CM will continue to follow.    Final Discharge Disposition Code 01 - home or self-care              Continued Care and Services - Admitted Since 10/10/2022    Coordination has not been started for this encounter.       Expected Discharge Date and Time     Expected Discharge Date Expected Discharge Time    Oct 14, 2022          Demographic Summary     Row Name 10/11/22  1440       General Information    Admission Type inpatient    Arrived From home    Referral Source admission list    Reason for Consult discharge planning    Preferred Language English       Contact Information    Permission Granted to Share Info With                Functional Status     Row Name 10/11/22 1442       Functional Status    Usual Activity Tolerance fair    Current Activity Tolerance fair       Functional Status, IADL    Medications independent    Meal Preparation assistive equipment and person    Housekeeping assistive equipment and person    Laundry assistive equipment and person    Shopping assistive equipment and person               Psychosocial    No documentation.                Abuse/Neglect    No documentation.                Legal    No documentation.                Substance Abuse    No documentation.                Patient Forms    No documentation.                   Ysabel Henning RN

## 2022-10-11 NOTE — PROGRESS NOTES
[facility] Medicine Services  PROGRESS NOTE    Patient Name: Jovanna Ching  : 1965  MRN: [id number]    Date of Admission: 10/10/2022  Primary Care Physician: Lulu Love APRN    Subjective   Subjective     CC:  Dyspnea    HPI:  Continues with SOA/tightness/wheezing. Cough/scant sputum. NO f/c. No n/v. Notes toe pain from fall at home.    Review of Systems   Constitutional: Positive for activity change and fatigue.   HENT: Negative.    Respiratory: Negative.    Cardiovascular: Negative.    Gastrointestinal: Negative.    Genitourinary: Negative.    Musculoskeletal: Positive for arthralgias and myalgias.   Skin: Negative.    Neurological: Negative.      Objective   Objective     Vital Signs:   Temp:  [98 °F (36.7 °C)-99.2 °F (37.3 °C)] 98 °F (36.7 °C)  Heart Rate:  [] 74  Resp:  [18-20] 20  BP: (112-170)/(58-94) 127/77  Flow (L/min):  [4.5-6] 6     Physical Exam:  NAD, alert and oriented  OP clear, MMM  Neck supple  No LAD  RRR  Basilar wheezes  +BS, ND, NT, soft  NO c/c/e  No rashes  CAGE  Obese    Results Reviewed:  LAB RESULTS:      Lab 10/11/22  0628 10/10/22  154   WBC 10.65 10.28   HEMOGLOBIN 12.8 12.6   HEMATOCRIT 42.6 41.6   PLATELETS 357 363   NEUTROS ABS 9.45* 7.65*   IMMATURE GRANS (ABS) 0.08* 0.08*   LYMPHS ABS 0.72 1.54   MONOS ABS 0.38 0.77   EOS ABS 0.00 0.20   MCV 89.3 88.3   CRP  --  6.37*   * 446*   D DIMER QUANT  --  1.03*         Lab 10/11/22  0628 10/10/22  1548   SODIUM 134* 137   POTASSIUM 4.9 4.9   CHLORIDE 93* 97*   CO2 30.0* 33.0*   ANION GAP 11.0 7.0   BUN 15 16   CREATININE 0.60 0.63   EGFR 104.2 103.0   GLUCOSE 159* 163*   CALCIUM 8.9 9.1   MAGNESIUM 2.2  --          Lab 10/11/22  0628 10/10/22  1548   TOTAL PROTEIN 7.0 7.5   ALBUMIN 3.70 3.80   GLOBULIN 3.3 3.7   ALT (SGPT) 29 34*   AST (SGOT) 16 27   BILIRUBIN 0.2 0.3   BILIRUBIN DIRECT <0.2  --    ALK PHOS 130* 147*         Lab 10/10/22  1548   PROBNP 161.4   TROPONIN T <0.010              Lab 10/10/22  1548   FERRITIN 229.40*         Lab 10/10/22  1643   PH, ARTERIAL 7.312*   PCO2, ARTERIAL 71.3*   PO2 ART 62.5*   FIO2 40   HCO3 ART 36.1*   BASE EXCESS ART 7.3*   CARBOXYHEMOGLOBIN 4.8*     Brief Urine Lab Results     None          Microbiology Results Abnormal     None          XR Chest 1 View    Result Date: 10/10/2022   DATE OF EXAM: 10/10/2022 3:32 PM  PROCEDURE: XR CHEST 1 VW-  INDICATIONS: CHF/COPD protocol  COMPARISON: 11/18/2021 and prior  TECHNIQUE: Portable Chest  FINDINGS:  Study limited by body habitus and overlying support and monitoring apparatus  The heart size is mildly enlarged. Pulmonary vascularity is congested and indistinct. Increased groundglass and interstitial opacities noted with a perihilar and bibasilar predominance.      Impression: IMPRESSION : Cardiomegaly with mild pulmonary vascular congestion, pulmonary edema.[  This report was finalized on 10/10/2022 3:43 PM by Adam Taylor.      CT Angiogram Chest    Result Date: 10/10/2022  DATE OF EXAM: 10/10/2022 7:25 PM  PROCEDURE: CT ANGIOGRAM CHEST-  INDICATIONS: Respiratory failure, positive D-dimer; J96.21-Acute and chronic respiratory failure with hypoxia; J96.22-Acute and chronic respiratory failure with hypercapnia; J45.901-Unspecified asthma with (acute) exacerbation; U07.1-COVID-19  COMPARISON: CTA chest 11/18/2021  TECHNIQUE: Contiguous axial imaging was obtained from the thoracic inlet through the upper abdomen following the intravenous administration of 85 mL of Isovue 370. Reconstructed coronal and sagittal images were also obtained. Automated exposure control and iterative reconstruction methods were used. Rotational 3-D MIP reformat of the pulmonary arterial vasculature was created at independent workstation.  The radiation dose reduction device was turned on for each scan per the ALARA (As Low as Reasonably Achievable) protocol.  FINDINGS:  Normal appearance of the pulmonary arteries without filling defect  to suggest pulmonary embolism. Mild ectasia of the thoracic aorta. Unremarkable appearance of the cardiac chambers. No pericardial effusion. No significant coronary artery calcifications. There are shotty mediastinal lymph nodes without bulky or clearly pathologic mediastinal or hilar adenopathy. Redemonstration of heterogeneous multinodular thyroid gland. The chest wall soft tissues are normal. No axillary lymphadenopathy. The trachea and mainstem bronchi are patent. The smaller peripheral airways are unremarkable. There is moderate centrilobular and paraseptal emphysema, worst in the upper lobes. No focal consolidation or evidence of active infectious or inflammatory process. No lung mass. There is a subpleural 4 mm solid noncalcified nodule in the left lung base (series 6 image 82). No acute osseous findings. Similar diffuse thickening of the left adrenal gland with otherwise unremarkable appearance of the partially imaged upper abdomen.      Impression: No evidence of pulmonary embolism. No acute intrathoracic findings. Redemonstration of emphysema.  Incidental note of 4 mm solid noncalcified subpleural nodule in the left lower lobe. Per Fleischner criteria, in a high risk patient, CT of the chest in one year can be performed for follow-up.  This report was finalized on 10/10/2022 8:08 PM by Kar Andrews MD.        Results for orders placed during the hospital encounter of 11/18/21    Adult Transthoracic Echo Complete w/ Color, Spectral and Contrast if necessary per protocol    Interpretation Summary  · Left ventricular ejection fraction appears to be 61 - 65%. Left ventricular systolic function is normal.  · Left ventricular diastolic function was normal.  · The right ventricular cavity is mildly dilated.  · Moderate tricuspid valve regurgitation is present.  · Estimated right ventricular systolic pressure from tricuspid regurgitation is mildly elevated (35-45 mmHg).  · Mild pulmonary hypertension is  present.      I have reviewed the medications:  Scheduled Meds:dexamethasone, 6 mg, Oral, Daily   Or  dexamethasone, 6 mg, Intravenous, Daily  enoxaparin, 1 mg/kg, Subcutaneous, Q12H  fenofibrate, 145 mg, Oral, Daily  insulin regular, 0-7 Units, Subcutaneous, Q6H  lisinopril, 20 mg, Oral, Daily  nicotine, 1 patch, Transdermal, Q24H  remdesivir, 100 mg, Intravenous, Daily With Lunch  sodium chloride, 10 mL, Intravenous, Q12H  traZODone, 50 mg, Oral, Nightly      Continuous Infusions:Pharmacy Consult - Remdesivir,       PRN Meds:.•  acetaminophen  •  albuterol sulfate HFA  •  dextrose  •  dextrose  •  glucagon (human recombinant)  •  HYDROcodone-acetaminophen  •  ondansetron  •  Pharmacy Consult - Remdesivir  •  sodium chloride  •  sodium chloride    Assessment & Plan   Assessment & Plan     Active Hospital Problems    Diagnosis  POA   • **Acute on chronic respiratory failure with hypoxia and hypercapnia (HCC) [J96.21, J96.22]  Yes   • COVID-19 virus detected [U07.1]  Yes      Resolved Hospital Problems   No resolved problems to display.        Brief Hospital Course to date:  Jovanna Ching is a 56 y.o. female here with COVID 19, with acute on chronic hypoxic respiratory failure and COPD with AE    COVID-19  Acute on chronic hypoxic respiratory failure  COPD with AE  -steroids  -remdesivir/dex  -proair inhaler  -lovenox for PPx    DM  -basal/bolus coverage    HTN  -home ACE    HL  -on fenofibrate    Tobacco abuse  -counseled    Expected Discharge Location and Transportation: Home  Expected Discharge Date: 10/13    DVT prophylaxis:  Medical DVT prophylaxis orders are present.          CODE STATUS:   Code Status and Medical Interventions:   Ordered at: 10/10/22 1840     Level Of Support Discussed With:    Patient     Code Status (Patient has no pulse and is not breathing):    CPR (Attempt to Resuscitate)     Medical Interventions (Patient has pulse or is breathing):    Full Support       Otilio Owens,  MD  10/11/22

## 2022-10-11 NOTE — PLAN OF CARE
Goal Outcome Evaluation:           Progress: no change  Outcome Evaluation: Pt admitted from ED during shift. VSS on 6L NC/bipap. PRN tylenol given for right ankle pain. IV rocephin started. Will continue with current POC

## 2022-10-12 ENCOUNTER — APPOINTMENT (OUTPATIENT)
Dept: GENERAL RADIOLOGY | Facility: HOSPITAL | Age: 57
End: 2022-10-12

## 2022-10-12 LAB
ALBUMIN SERPL-MCNC: 3.9 G/DL (ref 3.5–5.2)
ALP SERPL-CCNC: 119 U/L (ref 39–117)
ALT SERPL W P-5'-P-CCNC: 23 U/L (ref 1–33)
ANION GAP SERPL CALCULATED.3IONS-SCNC: 9 MMOL/L (ref 5–15)
AST SERPL-CCNC: 13 U/L (ref 1–32)
BILIRUB CONJ SERPL-MCNC: <0.2 MG/DL (ref 0–0.3)
BILIRUB INDIRECT SERPL-MCNC: ABNORMAL MG/DL
BILIRUB SERPL-MCNC: 0.2 MG/DL (ref 0–1.2)
BUN SERPL-MCNC: 23 MG/DL (ref 6–20)
BUN/CREAT SERPL: 37.1 (ref 7–25)
CALCIUM SPEC-SCNC: 9.4 MG/DL (ref 8.6–10.5)
CHLORIDE SERPL-SCNC: 94 MMOL/L (ref 98–107)
CO2 SERPL-SCNC: 31 MMOL/L (ref 22–29)
CREAT SERPL-MCNC: 0.62 MG/DL (ref 0.57–1)
CRP SERPL-MCNC: 2.52 MG/DL (ref 0–0.5)
DEPRECATED RDW RBC AUTO: 47.7 FL (ref 37–54)
EGFRCR SERPLBLD CKD-EPI 2021: 103.4 ML/MIN/1.73
ERYTHROCYTE [DISTWIDTH] IN BLOOD BY AUTOMATED COUNT: 14.9 % (ref 12.3–15.4)
FERRITIN SERPL-MCNC: 144.9 NG/ML (ref 13–150)
GLUCOSE BLDC GLUCOMTR-MCNC: 140 MG/DL (ref 70–130)
GLUCOSE BLDC GLUCOMTR-MCNC: 173 MG/DL (ref 70–130)
GLUCOSE BLDC GLUCOMTR-MCNC: 215 MG/DL (ref 70–130)
GLUCOSE BLDC GLUCOMTR-MCNC: 216 MG/DL (ref 70–130)
GLUCOSE SERPL-MCNC: 152 MG/DL (ref 65–99)
HCT VFR BLD AUTO: 42.9 % (ref 34–46.6)
HGB BLD-MCNC: 13.7 G/DL (ref 12–15.9)
LDH SERPL-CCNC: 219 U/L (ref 135–214)
MCH RBC QN AUTO: 28.1 PG (ref 26.6–33)
MCHC RBC AUTO-ENTMCNC: 31.9 G/DL (ref 31.5–35.7)
MCV RBC AUTO: 87.9 FL (ref 79–97)
PLATELET # BLD AUTO: 364 10*3/MM3 (ref 140–450)
PMV BLD AUTO: 9.6 FL (ref 6–12)
POTASSIUM SERPL-SCNC: 4.8 MMOL/L (ref 3.5–5.2)
PROT SERPL-MCNC: 6.8 G/DL (ref 6–8.5)
RBC # BLD AUTO: 4.88 10*6/MM3 (ref 3.77–5.28)
SODIUM SERPL-SCNC: 134 MMOL/L (ref 136–145)
WBC NRBC COR # BLD: 11.71 10*3/MM3 (ref 3.4–10.8)

## 2022-10-12 PROCEDURE — 86140 C-REACTIVE PROTEIN: CPT | Performed by: INTERNAL MEDICINE

## 2022-10-12 PROCEDURE — 71045 X-RAY EXAM CHEST 1 VIEW: CPT

## 2022-10-12 PROCEDURE — 80048 BASIC METABOLIC PNL TOTAL CA: CPT | Performed by: HOSPITALIST

## 2022-10-12 PROCEDURE — 85027 COMPLETE CBC AUTOMATED: CPT | Performed by: HOSPITALIST

## 2022-10-12 PROCEDURE — 82962 GLUCOSE BLOOD TEST: CPT

## 2022-10-12 PROCEDURE — 83615 LACTATE (LD) (LDH) ENZYME: CPT | Performed by: INTERNAL MEDICINE

## 2022-10-12 PROCEDURE — 94660 CPAP INITIATION&MGMT: CPT

## 2022-10-12 PROCEDURE — 99232 SBSQ HOSP IP/OBS MODERATE 35: CPT | Performed by: HOSPITALIST

## 2022-10-12 PROCEDURE — 25010000002 REMDESIVIR 100 MG/20ML SOLUTION 1 EACH VIAL: Performed by: INTERNAL MEDICINE

## 2022-10-12 PROCEDURE — 82728 ASSAY OF FERRITIN: CPT | Performed by: INTERNAL MEDICINE

## 2022-10-12 PROCEDURE — 63710000001 INSULIN REGULAR HUMAN PER 5 UNITS: Performed by: INTERNAL MEDICINE

## 2022-10-12 PROCEDURE — 25010000002 ENOXAPARIN PER 10 MG: Performed by: HOSPITALIST

## 2022-10-12 PROCEDURE — 80076 HEPATIC FUNCTION PANEL: CPT | Performed by: INTERNAL MEDICINE

## 2022-10-12 PROCEDURE — 94799 UNLISTED PULMONARY SVC/PX: CPT

## 2022-10-12 RX ORDER — BUMETANIDE 0.25 MG/ML
0.5 INJECTION INTRAMUSCULAR; INTRAVENOUS ONCE
Status: COMPLETED | OUTPATIENT
Start: 2022-10-12 | End: 2022-10-12

## 2022-10-12 RX ADMIN — HYDROCODONE BITARTRATE AND ACETAMINOPHEN 1 TABLET: 5; 325 TABLET ORAL at 16:26

## 2022-10-12 RX ADMIN — HYDROCODONE BITARTRATE AND ACETAMINOPHEN 1 TABLET: 5; 325 TABLET ORAL at 08:15

## 2022-10-12 RX ADMIN — ENOXAPARIN SODIUM 40 MG: 40 INJECTION SUBCUTANEOUS at 08:15

## 2022-10-12 RX ADMIN — INSULIN HUMAN 3 UNITS: 100 INJECTION, SOLUTION PARENTERAL at 18:00

## 2022-10-12 RX ADMIN — LISINOPRIL 20 MG: 20 TABLET ORAL at 08:15

## 2022-10-12 RX ADMIN — HYDROCODONE BITARTRATE AND ACETAMINOPHEN 1 TABLET: 5; 325 TABLET ORAL at 20:37

## 2022-10-12 RX ADMIN — REMDESIVIR 100 MG: 100 INJECTION, POWDER, LYOPHILIZED, FOR SOLUTION INTRAVENOUS at 12:55

## 2022-10-12 RX ADMIN — Medication 1 PATCH: at 08:16

## 2022-10-12 RX ADMIN — INSULIN HUMAN 2 UNITS: 100 INJECTION, SOLUTION PARENTERAL at 20:25

## 2022-10-12 RX ADMIN — HYDROCODONE BITARTRATE AND ACETAMINOPHEN 1 TABLET: 5; 325 TABLET ORAL at 12:55

## 2022-10-12 RX ADMIN — TRAZODONE HYDROCHLORIDE 50 MG: 50 TABLET ORAL at 20:25

## 2022-10-12 RX ADMIN — Medication 10 ML: at 20:25

## 2022-10-12 RX ADMIN — Medication 10 ML: at 08:16

## 2022-10-12 RX ADMIN — DEXAMETHASONE 6 MG: 2 TABLET ORAL at 08:15

## 2022-10-12 RX ADMIN — ACETAMINOPHEN 650 MG: 325 TABLET, FILM COATED ORAL at 14:00

## 2022-10-12 RX ADMIN — FENOFIBRATE 145 MG: 145 TABLET ORAL at 08:16

## 2022-10-12 RX ADMIN — INSULIN HUMAN 3 UNITS: 100 INJECTION, SOLUTION PARENTERAL at 12:55

## 2022-10-12 RX ADMIN — BUMETANIDE 0.5 MG: 0.25 INJECTION, SOLUTION INTRAMUSCULAR; INTRAVENOUS at 12:55

## 2022-10-12 RX ADMIN — ACETAMINOPHEN 650 MG: 325 TABLET, FILM COATED ORAL at 09:38

## 2022-10-12 NOTE — PROGRESS NOTES
[facility] Medicine Services  PROGRESS NOTE    Patient Name: Jovanna Ching  : 1965  MRN: [id number]    Date of Admission: 10/10/2022  Primary Care Physician: Lulu Love APRN    Subjective   Subjective     CC:  Dyspnea    HPI:  Continues with SOA/tightness/wheezing, but better overall. Difficulty producing sputum. No f/c. Good appetite. Toe pain better.    Review of Systems   Constitutional: Positive for activity change and fatigue.   HENT: Negative.    Respiratory: Negative.    Cardiovascular: Negative.    Gastrointestinal: Negative.    Genitourinary: Negative.    Musculoskeletal: Positive for arthralgias and myalgias.   Skin: Negative.    Neurological: Negative.      Objective   Objective     Vital Signs:   Temp:  [97 °F (36.1 °C)-99.3 °F (37.4 °C)] 97.2 °F (36.2 °C)  Heart Rate:  [] 74  Resp:  [18] 18  BP: (103-121)/(64-98) 113/69  Flow (L/min):  [6] 6     Physical Exam:  NAD, alert and oriented  OP clear, MMM  Neck supple  No LAD  RRR  Basilar wheezes, but improving and less than 10/11  +BS, ND, NT, soft  NO c/c  Tr LE edema  No rashes  CAGE  Obese  No change otherwise from 10/11    Results Reviewed:  LAB RESULTS:      Lab 10/12/22  0431 10/11/22  0628 10/10/22  1548   WBC 11.71* 10.65 10.28   HEMOGLOBIN 13.7 12.8 12.6   HEMATOCRIT 42.9 42.6 41.6   PLATELETS 364 357 363   NEUTROS ABS  --  9.45* 7.65*   IMMATURE GRANS (ABS)  --  0.08* 0.08*   LYMPHS ABS  --  0.72 1.54   MONOS ABS  --  0.38 0.77   EOS ABS  --  0.00 0.20   MCV 87.9 89.3 88.3   CRP  --   --  6.37*   LDH  --  224* 446*   D DIMER QUANT  --   --  1.03*         Lab 10/12/22  0431 10/11/22  0628 10/10/22  1548   SODIUM 134* 134* 137   POTASSIUM 4.8 4.9 4.9   CHLORIDE 94* 93* 97*   CO2 31.0* 30.0* 33.0*   ANION GAP 9.0 11.0 7.0   BUN 23* 15 16   CREATININE 0.62 0.60 0.63   EGFR 103.4 104.2 103.0   GLUCOSE 152* 159* 163*   CALCIUM 9.4 8.9 9.1   MAGNESIUM  --  2.2  --          Lab 10/12/22  0431 10/11/22  0628  10/10/22  1548   TOTAL PROTEIN 6.8 7.0 7.5   ALBUMIN 3.90 3.70 3.80   GLOBULIN  --  3.3 3.7   ALT (SGPT) 23 29 34*   AST (SGOT) 13 16 27   BILIRUBIN 0.2 0.2 0.3   BILIRUBIN DIRECT <0.2 <0.2  --    ALK PHOS 119* 130* 147*         Lab 10/10/22  1548   PROBNP 161.4   TROPONIN T <0.010             Lab 10/10/22  1548   FERRITIN 229.40*         Lab 10/10/22  1643   PH, ARTERIAL 7.312*   PCO2, ARTERIAL 71.3*   PO2 ART 62.5*   FIO2 40   HCO3 ART 36.1*   BASE EXCESS ART 7.3*   CARBOXYHEMOGLOBIN 4.8*     Brief Urine Lab Results     None          Microbiology Results Abnormal     None          XR Chest 1 View    Result Date: 10/12/2022  DATE OF EXAM: 10/12/2022 5:28 AM  PROCEDURE: XR CHEST 1 VW-  INDICATIONS: DYSPNEA, ON EXERTION  COMPARISON: 10/10/2022  TECHNIQUE: Single radiographic AP view of the chest was obtained.  FINDINGS: Unchanged diffuse interstitial prominence with appearance suggesting vascular congestion. There is no enlarging effusion or distinct pneumothorax. Unchanged cardiac enlargement. No new focal airspace consolidation.      Impression: Unchanged diffuse interstitial prominence with appearance suggesting vascular congestion. There is no enlarging effusion or distinct pneumothorax. Unchanged cardiac enlargement. No new focal airspace consolidation.  This report was finalized on 10/12/2022 8:26 AM by John Gonzalez.      XR Chest 1 View    Result Date: 10/10/2022   DATE OF EXAM: 10/10/2022 3:32 PM  PROCEDURE: XR CHEST 1 VW-  INDICATIONS: CHF/COPD protocol  COMPARISON: 11/18/2021 and prior  TECHNIQUE: Portable Chest  FINDINGS:  Study limited by body habitus and overlying support and monitoring apparatus  The heart size is mildly enlarged. Pulmonary vascularity is congested and indistinct. Increased groundglass and interstitial opacities noted with a perihilar and bibasilar predominance.      Impression: IMPRESSION : Cardiomegaly with mild pulmonary vascular congestion, pulmonary edema.[  This report was  finalized on 10/10/2022 3:43 PM by Adam Taylor.      CT Angiogram Chest    Result Date: 10/10/2022  DATE OF EXAM: 10/10/2022 7:25 PM  PROCEDURE: CT ANGIOGRAM CHEST-  INDICATIONS: Respiratory failure, positive D-dimer; J96.21-Acute and chronic respiratory failure with hypoxia; J96.22-Acute and chronic respiratory failure with hypercapnia; J45.901-Unspecified asthma with (acute) exacerbation; U07.1-COVID-19  COMPARISON: CTA chest 11/18/2021  TECHNIQUE: Contiguous axial imaging was obtained from the thoracic inlet through the upper abdomen following the intravenous administration of 85 mL of Isovue 370. Reconstructed coronal and sagittal images were also obtained. Automated exposure control and iterative reconstruction methods were used. Rotational 3-D MIP reformat of the pulmonary arterial vasculature was created at independent workstation.  The radiation dose reduction device was turned on for each scan per the ALARA (As Low as Reasonably Achievable) protocol.  FINDINGS:  Normal appearance of the pulmonary arteries without filling defect to suggest pulmonary embolism. Mild ectasia of the thoracic aorta. Unremarkable appearance of the cardiac chambers. No pericardial effusion. No significant coronary artery calcifications. There are shotty mediastinal lymph nodes without bulky or clearly pathologic mediastinal or hilar adenopathy. Redemonstration of heterogeneous multinodular thyroid gland. The chest wall soft tissues are normal. No axillary lymphadenopathy. The trachea and mainstem bronchi are patent. The smaller peripheral airways are unremarkable. There is moderate centrilobular and paraseptal emphysema, worst in the upper lobes. No focal consolidation or evidence of active infectious or inflammatory process. No lung mass. There is a subpleural 4 mm solid noncalcified nodule in the left lung base (series 6 image 82). No acute osseous findings. Similar diffuse thickening of the left adrenal gland with otherwise  unremarkable appearance of the partially imaged upper abdomen.      Impression: No evidence of pulmonary embolism. No acute intrathoracic findings. Redemonstration of emphysema.  Incidental note of 4 mm solid noncalcified subpleural nodule in the left lower lobe. Per Fleischner criteria, in a high risk patient, CT of the chest in one year can be performed for follow-up.  This report was finalized on 10/10/2022 8:08 PM by Kar Andrews MD.        Results for orders placed during the hospital encounter of 11/18/21    Adult Transthoracic Echo Complete w/ Color, Spectral and Contrast if necessary per protocol    Interpretation Summary  · Left ventricular ejection fraction appears to be 61 - 65%. Left ventricular systolic function is normal.  · Left ventricular diastolic function was normal.  · The right ventricular cavity is mildly dilated.  · Moderate tricuspid valve regurgitation is present.  · Estimated right ventricular systolic pressure from tricuspid regurgitation is mildly elevated (35-45 mmHg).  · Mild pulmonary hypertension is present.      I have reviewed the medications:  Scheduled Meds:bumetanide, 0.5 mg, Intravenous, Once  dexamethasone, 6 mg, Oral, Daily   Or  dexamethasone, 6 mg, Intravenous, Daily  enoxaparin, 40 mg, Subcutaneous, Q24H  fenofibrate, 145 mg, Oral, Daily  insulin regular, 0-7 Units, Subcutaneous, Q6H  lisinopril, 20 mg, Oral, Daily  nicotine, 1 patch, Transdermal, Q24H  remdesivir, 100 mg, Intravenous, Daily With Lunch  sodium chloride, 10 mL, Intravenous, Q12H  traZODone, 50 mg, Oral, Nightly      Continuous Infusions:Pharmacy Consult - Remdesivir,       PRN Meds:.•  acetaminophen  •  albuterol sulfate HFA  •  dextrose  •  dextrose  •  glucagon (human recombinant)  •  HYDROcodone-acetaminophen  •  ondansetron  •  Pharmacy Consult - Remdesivir  •  sodium chloride  •  sodium chloride    Assessment & Plan   Assessment & Plan     Active Hospital Problems    Diagnosis  POA   • **Acute on  chronic respiratory failure with hypoxia and hypercapnia (HCC) [J96.21, J96.22]  Yes   • COVID-19 virus detected [U07.1]  Yes      Resolved Hospital Problems   No resolved problems to display.        Brief Hospital Course to date:  Jovanna Ching is a 56 y.o. female here with COVID 19, with acute on chronic hypoxic respiratory failure and COPD with AE    COVID-19  Acute on chronic hypoxic respiratory failure  COPD with AE  -steroids  -remdesivir/dex  -proair inhaler  -continue to wean oxygen as tolerated  -lovenox for PPx    DM  -basal/bolus coverage as needed, stable and below 200    HTN  -home ACE    HL  -on fenofibrate    Tobacco abuse  -counseled    Expected Discharge Location and Transportation: Home  Expected Discharge Date: 10/13    DVT prophylaxis:  Medical DVT prophylaxis orders are present.          CODE STATUS:   Code Status and Medical Interventions:   Ordered at: 10/10/22 1840     Level Of Support Discussed With:    Patient     Code Status (Patient has no pulse and is not breathing):    CPR (Attempt to Resuscitate)     Medical Interventions (Patient has pulse or is breathing):    Full Support       Otilio Owens MD  10/12/22

## 2022-10-12 NOTE — CASE MANAGEMENT/SOCIAL WORK
Continued Stay Note   [place]     Patient Name: Jovanna Ching  MRN: [id number]  Today's Date: 10/12/2022    Admit Date: 10/10/2022    Plan: Home   Discharge Plan     Row Name 10/12/22 1449       Plan    Plan Home    Patient/Family in Agreement with Plan yes    Plan Comments Spoke with patient via telephone.  Patient reports her plan is to go home upon discharge and she has arranged her own transportation.  Denies needs at this time.  CM will continue to follow.               Discharge Codes    No documentation.               Expected Discharge Date and Time     Expected Discharge Date Expected Discharge Time    Oct 14, 2022             Ysabel Henning RN

## 2022-10-13 ENCOUNTER — APPOINTMENT (OUTPATIENT)
Dept: CARDIOLOGY | Facility: HOSPITAL | Age: 57
End: 2022-10-13

## 2022-10-13 ENCOUNTER — APPOINTMENT (OUTPATIENT)
Dept: GENERAL RADIOLOGY | Facility: HOSPITAL | Age: 57
End: 2022-10-13

## 2022-10-13 LAB
ALBUMIN SERPL-MCNC: 3.6 G/DL (ref 3.5–5.2)
ALP SERPL-CCNC: 101 U/L (ref 39–117)
ALT SERPL W P-5'-P-CCNC: 19 U/L (ref 1–33)
ANION GAP SERPL CALCULATED.3IONS-SCNC: 6 MMOL/L (ref 5–15)
AST SERPL-CCNC: 12 U/L (ref 1–32)
BH CV UPPER VENOUS LEFT AXILLARY AUGMENT: NORMAL
BH CV UPPER VENOUS LEFT AXILLARY COMPRESS: NORMAL
BH CV UPPER VENOUS LEFT AXILLARY PHASIC: NORMAL
BH CV UPPER VENOUS LEFT AXILLARY SPONT: NORMAL
BH CV UPPER VENOUS LEFT BASILIC FOREARM COMPRESS: NORMAL
BH CV UPPER VENOUS LEFT BASILIC UPPER COMPRESS: NORMAL
BH CV UPPER VENOUS LEFT BRACHIAL COMPRESS: NORMAL
BH CV UPPER VENOUS LEFT CEPHALIC FOREARM COMPRESS: NORMAL
BH CV UPPER VENOUS LEFT CEPHALIC UPPER COMPRESS: NORMAL
BH CV UPPER VENOUS LEFT INTERNAL JUGULAR AUGMENT: NORMAL
BH CV UPPER VENOUS LEFT INTERNAL JUGULAR COMPRESS: NORMAL
BH CV UPPER VENOUS LEFT INTERNAL JUGULAR PHASIC: NORMAL
BH CV UPPER VENOUS LEFT INTERNAL JUGULAR SPONT: NORMAL
BH CV UPPER VENOUS LEFT RADIAL COMPRESS: NORMAL
BH CV UPPER VENOUS LEFT SUBCLAVIAN AUGMENT: NORMAL
BH CV UPPER VENOUS LEFT SUBCLAVIAN COMPRESS: NORMAL
BH CV UPPER VENOUS LEFT SUBCLAVIAN PHASIC: NORMAL
BH CV UPPER VENOUS LEFT SUBCLAVIAN SPONT: NORMAL
BH CV UPPER VENOUS LEFT ULNAR COMPRESS: NORMAL
BH CV UPPER VENOUS RIGHT SUBCLAVIAN AUGMENT: NORMAL
BH CV UPPER VENOUS RIGHT SUBCLAVIAN COMPRESS: NORMAL
BH CV UPPER VENOUS RIGHT SUBCLAVIAN PHASIC: NORMAL
BH CV UPPER VENOUS RIGHT SUBCLAVIAN SPONT: NORMAL
BILIRUB CONJ SERPL-MCNC: <0.2 MG/DL (ref 0–0.3)
BILIRUB INDIRECT SERPL-MCNC: NORMAL MG/DL
BILIRUB SERPL-MCNC: 0.2 MG/DL (ref 0–1.2)
BUN SERPL-MCNC: 16 MG/DL (ref 6–20)
BUN/CREAT SERPL: 28.6 (ref 7–25)
CALCIUM SPEC-SCNC: 8.8 MG/DL (ref 8.6–10.5)
CHLORIDE SERPL-SCNC: 97 MMOL/L (ref 98–107)
CO2 SERPL-SCNC: 34 MMOL/L (ref 22–29)
CREAT SERPL-MCNC: 0.56 MG/DL (ref 0.57–1)
DEPRECATED RDW RBC AUTO: 47.5 FL (ref 37–54)
EGFRCR SERPLBLD CKD-EPI 2021: 105.9 ML/MIN/1.73
ERYTHROCYTE [DISTWIDTH] IN BLOOD BY AUTOMATED COUNT: 14.8 % (ref 12.3–15.4)
GLUCOSE BLDC GLUCOMTR-MCNC: 129 MG/DL (ref 70–130)
GLUCOSE BLDC GLUCOMTR-MCNC: 164 MG/DL (ref 70–130)
GLUCOSE BLDC GLUCOMTR-MCNC: 261 MG/DL (ref 70–130)
GLUCOSE SERPL-MCNC: 136 MG/DL (ref 65–99)
HCT VFR BLD AUTO: 40 % (ref 34–46.6)
HGB BLD-MCNC: 12 G/DL (ref 12–15.9)
LDH SERPL-CCNC: 184 U/L (ref 135–214)
MAXIMAL PREDICTED HEART RATE: 162 BPM
MCH RBC QN AUTO: 26.7 PG (ref 26.6–33)
MCHC RBC AUTO-ENTMCNC: 30 G/DL (ref 31.5–35.7)
MCV RBC AUTO: 89.1 FL (ref 79–97)
PLATELET # BLD AUTO: 377 10*3/MM3 (ref 140–450)
PMV BLD AUTO: 9.9 FL (ref 6–12)
POTASSIUM SERPL-SCNC: 4.5 MMOL/L (ref 3.5–5.2)
PROT SERPL-MCNC: 6.2 G/DL (ref 6–8.5)
RBC # BLD AUTO: 4.49 10*6/MM3 (ref 3.77–5.28)
SODIUM SERPL-SCNC: 137 MMOL/L (ref 136–145)
STRESS TARGET HR: 138 BPM
WBC NRBC COR # BLD: 10.21 10*3/MM3 (ref 3.4–10.8)

## 2022-10-13 PROCEDURE — 94799 UNLISTED PULMONARY SVC/PX: CPT

## 2022-10-13 PROCEDURE — 25010000002 ENOXAPARIN PER 10 MG: Performed by: HOSPITALIST

## 2022-10-13 PROCEDURE — 93971 EXTREMITY STUDY: CPT | Performed by: INTERNAL MEDICINE

## 2022-10-13 PROCEDURE — 85027 COMPLETE CBC AUTOMATED: CPT | Performed by: HOSPITALIST

## 2022-10-13 PROCEDURE — 99232 SBSQ HOSP IP/OBS MODERATE 35: CPT | Performed by: HOSPITALIST

## 2022-10-13 PROCEDURE — 83615 LACTATE (LD) (LDH) ENZYME: CPT | Performed by: INTERNAL MEDICINE

## 2022-10-13 PROCEDURE — 80076 HEPATIC FUNCTION PANEL: CPT | Performed by: INTERNAL MEDICINE

## 2022-10-13 PROCEDURE — 71045 X-RAY EXAM CHEST 1 VIEW: CPT

## 2022-10-13 PROCEDURE — 80048 BASIC METABOLIC PNL TOTAL CA: CPT | Performed by: HOSPITALIST

## 2022-10-13 PROCEDURE — 82962 GLUCOSE BLOOD TEST: CPT

## 2022-10-13 PROCEDURE — 94660 CPAP INITIATION&MGMT: CPT

## 2022-10-13 PROCEDURE — 63710000001 INSULIN REGULAR HUMAN PER 5 UNITS: Performed by: INTERNAL MEDICINE

## 2022-10-13 PROCEDURE — 25010000002 REMDESIVIR 100 MG/20ML SOLUTION 1 EACH VIAL: Performed by: INTERNAL MEDICINE

## 2022-10-13 PROCEDURE — 93971 EXTREMITY STUDY: CPT

## 2022-10-13 RX ADMIN — HYDROCODONE BITARTRATE AND ACETAMINOPHEN 1 TABLET: 5; 325 TABLET ORAL at 07:53

## 2022-10-13 RX ADMIN — HYDROCODONE BITARTRATE AND ACETAMINOPHEN 1 TABLET: 5; 325 TABLET ORAL at 21:15

## 2022-10-13 RX ADMIN — LISINOPRIL 20 MG: 20 TABLET ORAL at 07:56

## 2022-10-13 RX ADMIN — DEXAMETHASONE 6 MG: 2 TABLET ORAL at 07:55

## 2022-10-13 RX ADMIN — Medication 1 PATCH: at 07:56

## 2022-10-13 RX ADMIN — HYDROCODONE BITARTRATE AND ACETAMINOPHEN 1 TABLET: 5; 325 TABLET ORAL at 15:37

## 2022-10-13 RX ADMIN — FENOFIBRATE 145 MG: 145 TABLET ORAL at 09:05

## 2022-10-13 RX ADMIN — Medication 10 ML: at 21:14

## 2022-10-13 RX ADMIN — ENOXAPARIN SODIUM 40 MG: 40 INJECTION SUBCUTANEOUS at 07:55

## 2022-10-13 RX ADMIN — HYDROCODONE BITARTRATE AND ACETAMINOPHEN 1 TABLET: 5; 325 TABLET ORAL at 11:48

## 2022-10-13 RX ADMIN — INSULIN HUMAN 4 UNITS: 100 INJECTION, SOLUTION PARENTERAL at 11:49

## 2022-10-13 RX ADMIN — Medication 10 ML: at 07:56

## 2022-10-13 RX ADMIN — TRAZODONE HYDROCHLORIDE 50 MG: 50 TABLET ORAL at 21:14

## 2022-10-13 RX ADMIN — INSULIN HUMAN 2 UNITS: 100 INJECTION, SOLUTION PARENTERAL at 23:57

## 2022-10-13 RX ADMIN — REMDESIVIR 100 MG: 100 INJECTION, POWDER, LYOPHILIZED, FOR SOLUTION INTRAVENOUS at 11:48

## 2022-10-13 NOTE — PROGRESS NOTES
[facility] Medicine Services  PROGRESS NOTE    Patient Name: Jovanna Ching  : 1965  MRN: [id number]    Date of Admission: 10/10/2022  Primary Care Physician: Lulu Love APRN    Subjective   Subjective     CC:  Dyspnea    HPI:  Continues with SOA/tightness/wheezing. Tearful. Feels like no one is being particularly attentive to her needs. Notes LUE IV pain, out.    Review of Systems   Constitutional: Positive for activity change and fatigue.   HENT: Negative.    Respiratory: Negative.    Cardiovascular: Negative.    Gastrointestinal: Negative.    Genitourinary: Negative.    Musculoskeletal: Positive for arthralgias and myalgias.   Skin: Negative.    Neurological: Negative.      Objective   Objective     Vital Signs:   Temp:  [97.4 °F (36.3 °C)-98.5 °F (36.9 °C)] 97.4 °F (36.3 °C)  Heart Rate:  [66-93] 71  Resp:  [18-22] 18  BP: ()/(60-87) 101/64  Flow (L/min):  [6] 6     Physical Exam:  NAD, alert and oriented  OP clear, MMM  Neck supple  No LAD  RRR  Wheezing continues to improve  LUE with edema at AC/proximally, no redness, but certainly tender  +BS, ND, NT, soft  NO c/c  Tr LE edema  No rashes  CAGE  Obese  No change otherwise from 10/12    Results Reviewed:  LAB RESULTS:      Lab 10/13/22  0407 10/12/22  1759 10/12/22  0431 10/11/22  0628 10/10/22  1548   WBC 10.21  --  11.71* 10.65 10.28   HEMOGLOBIN 12.0  --  13.7 12.8 12.6   HEMATOCRIT 40.0  --  42.9 42.6 41.6   PLATELETS 377  --  364 357 363   NEUTROS ABS  --   --   --  9.45* 7.65*   IMMATURE GRANS (ABS)  --   --   --  0.08* 0.08*   LYMPHS ABS  --   --   --  0.72 1.54   MONOS ABS  --   --   --  0.38 0.77   EOS ABS  --   --   --  0.00 0.20   MCV 89.1  --  87.9 89.3 88.3   CRP  --  2.52*  --   --  6.37*    219*  --  224* 446*   D DIMER QUANT  --   --   --   --  1.03*         Lab 10/13/22  0407 10/12/22  0431 10/11/22  0628 10/10/22  1548   SODIUM 137 134* 134* 137   POTASSIUM 4.5 4.8 4.9 4.9   CHLORIDE 97* 94* 93*  97*   CO2 34.0* 31.0* 30.0* 33.0*   ANION GAP 6.0 9.0 11.0 7.0   BUN 16 23* 15 16   CREATININE 0.56* 0.62 0.60 0.63   EGFR 105.9 103.4 104.2 103.0   GLUCOSE 136* 152* 159* 163*   CALCIUM 8.8 9.4 8.9 9.1   MAGNESIUM  --   --  2.2  --          Lab 10/13/22  0407 10/12/22  0431 10/11/22  0628 10/10/22  1548   TOTAL PROTEIN 6.2 6.8 7.0 7.5   ALBUMIN 3.60 3.90 3.70 3.80   GLOBULIN  --   --  3.3 3.7   ALT (SGPT) 19 23 29 34*   AST (SGOT) 12 13 16 27   BILIRUBIN 0.2 0.2 0.2 0.3   BILIRUBIN DIRECT <0.2 <0.2 <0.2  --    ALK PHOS 101 119* 130* 147*         Lab 10/10/22  1548   PROBNP 161.4   TROPONIN T <0.010             Lab 10/12/22  1759   FERRITIN 144.90         Lab 10/10/22  1643   PH, ARTERIAL 7.312*   PCO2, ARTERIAL 71.3*   PO2 ART 62.5*   FIO2 40   HCO3 ART 36.1*   BASE EXCESS ART 7.3*   CARBOXYHEMOGLOBIN 4.8*     Brief Urine Lab Results     None          Microbiology Results Abnormal     None          XR Chest 1 View    Result Date: 10/13/2022   DATE OF EXAM: 10/13/2022 2:57 AM  PROCEDURE: XR CHEST 1 VW-  INDICATIONS: DYSPNEA, ON EXERTION  COMPARISON: 10/12/2022 and prior  TECHNIQUE: Portable Chest  FINDINGS:  Study is limited by overlying support and monitoring apparatus.  Heart size is stable. Pulmonary vascularity is mildly congested and indistinct diffuse increased groundglass and interstitial opacities with a fine nodular appearance is noted with a bibasilar predominance. No significant pleural effusion or focal consolidation is noted. Osseous structures are grossly unremarkable in appearance.      Impression: IMPRESSION : Diffuse interstitial opacities suggesting acute on chronic change. Findings suggest underlying interstitial edema.  Accounting for differences in technique no great change from the comparison[  This report was finalized on 10/13/2022 6:41 AM by Adam Taylor.      XR Chest 1 View    Result Date: 10/12/2022  DATE OF EXAM: 10/12/2022 5:28 AM  PROCEDURE: XR CHEST 1 VW-  INDICATIONS: DYSPNEA, ON  EXERTION  COMPARISON: 10/10/2022  TECHNIQUE: Single radiographic AP view of the chest was obtained.  FINDINGS: Unchanged diffuse interstitial prominence with appearance suggesting vascular congestion. There is no enlarging effusion or distinct pneumothorax. Unchanged cardiac enlargement. No new focal airspace consolidation.      Impression: Unchanged diffuse interstitial prominence with appearance suggesting vascular congestion. There is no enlarging effusion or distinct pneumothorax. Unchanged cardiac enlargement. No new focal airspace consolidation.  This report was finalized on 10/12/2022 8:26 AM by John Gonzalez.        Results for orders placed during the hospital encounter of 11/18/21    Adult Transthoracic Echo Complete w/ Color, Spectral and Contrast if necessary per protocol    Interpretation Summary  · Left ventricular ejection fraction appears to be 61 - 65%. Left ventricular systolic function is normal.  · Left ventricular diastolic function was normal.  · The right ventricular cavity is mildly dilated.  · Moderate tricuspid valve regurgitation is present.  · Estimated right ventricular systolic pressure from tricuspid regurgitation is mildly elevated (35-45 mmHg).  · Mild pulmonary hypertension is present.      I have reviewed the medications:  Scheduled Meds:dexamethasone, 6 mg, Oral, Daily   Or  dexamethasone, 6 mg, Intravenous, Daily  enoxaparin, 40 mg, Subcutaneous, Q24H  fenofibrate, 145 mg, Oral, Daily  insulin regular, 0-7 Units, Subcutaneous, Q6H  lisinopril, 20 mg, Oral, Daily  nicotine, 1 patch, Transdermal, Q24H  remdesivir, 100 mg, Intravenous, Daily With Lunch  sodium chloride, 10 mL, Intravenous, Q12H  traZODone, 50 mg, Oral, Nightly      Continuous Infusions:Pharmacy Consult - Remdesivir,       PRN Meds:.•  acetaminophen  •  albuterol sulfate HFA  •  dextrose  •  dextrose  •  glucagon (human recombinant)  •  HYDROcodone-acetaminophen  •  ondansetron  •  Pharmacy Consult - Remdesivir  •   sodium chloride  •  sodium chloride    Assessment & Plan   Assessment & Plan     Active Hospital Problems    Diagnosis  POA   • **Acute on chronic respiratory failure with hypoxia and hypercapnia (HCC) [J96.21, J96.22]  Yes   • COVID-19 virus detected [U07.1]  Yes      Resolved Hospital Problems   No resolved problems to display.        Brief Hospital Course to date:  Jovanna Ching is a 56 y.o. female here with COVID 19, with acute on chronic hypoxic respiratory failure and COPD with AE    COVID-19  Acute on chronic hypoxic respiratory failure  COPD with AE  -steroids  -remdesivir/dex  -proair inhaler  -continue to wean oxygen as tolerated, dropped to 4L during exam and stable  -lovenox for PPx    LUE edema, due to IV infiltration  -duplex  -warm compress/elevate  -IV out    DM  -basal/bolus coverage as needed, remains stable    HTN  -home ACE    HL  -on fenofibrate    Tobacco abuse  -counseled    Expected Discharge Location and Transportation: Home  Expected Discharge Date: 10/15    DVT prophylaxis:  Medical DVT prophylaxis orders are present.          CODE STATUS:   Code Status and Medical Interventions:   Ordered at: 10/10/22 1840     Level Of Support Discussed With:    Patient     Code Status (Patient has no pulse and is not breathing):    CPR (Attempt to Resuscitate)     Medical Interventions (Patient has pulse or is breathing):    Full Support       Otilio Owens MD  10/13/22

## 2022-10-14 ENCOUNTER — READMISSION MANAGEMENT (OUTPATIENT)
Dept: CALL CENTER | Facility: HOSPITAL | Age: 57
End: 2022-10-14

## 2022-10-14 VITALS
SYSTOLIC BLOOD PRESSURE: 114 MMHG | OXYGEN SATURATION: 99 % | BODY MASS INDEX: 40.16 KG/M2 | RESPIRATION RATE: 18 BRPM | WEIGHT: 265 LBS | DIASTOLIC BLOOD PRESSURE: 78 MMHG | HEART RATE: 81 BPM | TEMPERATURE: 97.7 F | HEIGHT: 68 IN

## 2022-10-14 PROBLEM — D89.831 CYTOKINE RELEASE SYNDROME, GRADE 1: Status: ACTIVE | Noted: 2022-10-14

## 2022-10-14 LAB
ALBUMIN SERPL-MCNC: 3.8 G/DL (ref 3.5–5.2)
ALP SERPL-CCNC: 102 U/L (ref 39–117)
ALT SERPL W P-5'-P-CCNC: 33 U/L (ref 1–33)
AST SERPL-CCNC: 24 U/L (ref 1–32)
BILIRUB CONJ SERPL-MCNC: <0.2 MG/DL (ref 0–0.3)
BILIRUB INDIRECT SERPL-MCNC: NORMAL MG/DL
BILIRUB SERPL-MCNC: 0.2 MG/DL (ref 0–1.2)
CREAT SERPL-MCNC: 0.54 MG/DL (ref 0.57–1)
EGFRCR SERPLBLD CKD-EPI 2021: 106.9 ML/MIN/1.73
GLUCOSE BLDC GLUCOMTR-MCNC: 122 MG/DL (ref 70–130)
GLUCOSE BLDC GLUCOMTR-MCNC: 168 MG/DL (ref 70–130)
GLUCOSE BLDC GLUCOMTR-MCNC: 193 MG/DL (ref 70–130)
PROT SERPL-MCNC: 6.5 G/DL (ref 6–8.5)

## 2022-10-14 PROCEDURE — 82962 GLUCOSE BLOOD TEST: CPT

## 2022-10-14 PROCEDURE — 25010000002 ENOXAPARIN PER 10 MG: Performed by: HOSPITALIST

## 2022-10-14 PROCEDURE — 94799 UNLISTED PULMONARY SVC/PX: CPT

## 2022-10-14 PROCEDURE — 94660 CPAP INITIATION&MGMT: CPT

## 2022-10-14 PROCEDURE — 63710000001 DEXAMETHASONE PER 0.25 MG: Performed by: INTERNAL MEDICINE

## 2022-10-14 PROCEDURE — 80076 HEPATIC FUNCTION PANEL: CPT | Performed by: INTERNAL MEDICINE

## 2022-10-14 PROCEDURE — 99239 HOSP IP/OBS DSCHRG MGMT >30: CPT | Performed by: PHYSICIAN ASSISTANT

## 2022-10-14 PROCEDURE — 82565 ASSAY OF CREATININE: CPT | Performed by: INTERNAL MEDICINE

## 2022-10-14 RX ORDER — HYDROCODONE BITARTRATE AND ACETAMINOPHEN 5; 325 MG/1; MG/1
1 TABLET ORAL EVERY 6 HOURS PRN
Status: DISCONTINUED | OUTPATIENT
Start: 2022-10-14 | End: 2022-10-14 | Stop reason: HOSPADM

## 2022-10-14 RX ORDER — FUROSEMIDE 40 MG/1
20 TABLET ORAL DAILY
Qty: 30 TABLET | Refills: 0 | Status: ON HOLD | OUTPATIENT
Start: 2022-10-14 | End: 2022-10-31 | Stop reason: SDUPTHER

## 2022-10-14 RX ORDER — DEXAMETHASONE 6 MG/1
6 TABLET ORAL DAILY
Qty: 6 TABLET | Refills: 0 | Status: SHIPPED | OUTPATIENT
Start: 2022-10-15 | End: 2022-10-21

## 2022-10-14 RX ORDER — HYDROCODONE BITARTRATE AND ACETAMINOPHEN 5; 325 MG/1; MG/1
1 TABLET ORAL EVERY 8 HOURS PRN
Qty: 6 TABLET | Refills: 0 | Status: SHIPPED | OUTPATIENT
Start: 2022-10-14 | End: 2022-11-23

## 2022-10-14 RX ORDER — POTASSIUM CHLORIDE 750 MG/1
CAPSULE, EXTENDED RELEASE ORAL
Start: 2022-10-14

## 2022-10-14 RX ORDER — TRAZODONE HYDROCHLORIDE 50 MG/1
50-100 TABLET ORAL NIGHTLY PRN
Start: 2022-10-14

## 2022-10-14 RX ADMIN — LISINOPRIL 20 MG: 20 TABLET ORAL at 08:43

## 2022-10-14 RX ADMIN — Medication 1 PATCH: at 08:46

## 2022-10-14 RX ADMIN — ENOXAPARIN SODIUM 40 MG: 40 INJECTION SUBCUTANEOUS at 08:43

## 2022-10-14 RX ADMIN — FENOFIBRATE 145 MG: 145 TABLET ORAL at 08:42

## 2022-10-14 RX ADMIN — HYDROCODONE BITARTATE AND ACETAMINOPHEN 1 TABLET: 5; 325 TABLET ORAL at 10:12

## 2022-10-14 RX ADMIN — DEXAMETHASONE 6 MG: 2 TABLET ORAL at 08:43

## 2022-10-14 NOTE — DISCHARGE INSTR - APPOINTMENTS
You have an appointment with Mira Dickens MD on November 1, 2022 @ 10:30 AM.   Call them if you have any questions. Phone: [phone]  [address] [place] [address] [address] [place] [address]

## 2022-10-14 NOTE — CASE MANAGEMENT/SOCIAL WORK
Case Management Discharge Note      Final Note: Spoke with patient by phone. Plan remains to go home.  Order for neb machine faxed to [facility], they will deliver to patient's home after discharge.  No other needs noted.         Selected Continued Care - Admitted Since 10/10/2022     Destination    No services have been selected for the patient.              Durable Medical Equipment     Service Provider Selected Services Address Phone Fax Patient Preferred    [facility] [address], [facility] [address] [phone] --          Dialysis/Infusion    No services have been selected for the patient.              Home Medical Care    No services have been selected for the patient.              Therapy    No services have been selected for the patient.              Community Resources    No services have been selected for the patient.              Community & DME    No services have been selected for the patient.                       Final Discharge Disposition Code: 01 - home or self-care

## 2022-10-14 NOTE — DISCHARGE SUMMARY
[facility] Medicine Services  DISCHARGE SUMMARY    Patient Name: Jovanna Ching  : 1965  MRN: [id number]    Date of Admission: 10/10/2022 12:49 PM  Date of Discharge: 10/14/2022  Primary Care Physician: Lulu Love APRN    Hospital Course     Presenting Problem:   Acute on chronic respiratory failure with hypoxia and hypercapnia (HCC) [J96.21, J96.22]    Active Hospital Problems    Diagnosis  POA   • **Acute on chronic respiratory failure with hypoxia and hypercapnia (HCC) [J96.21, J96.22]  Yes   • COVID-19 virus detected [U07.1]  Yes      Resolved Hospital Problems   No resolved problems to display.      Hospital Course:  Jovanna Ching is a 56 y.o. female with PMH significant for HTN, HLD, non-insulin dependent DMII and chronic hypoxic respiratory failure secondary to COPD (baseline 3L NC) with ongoing tobacco abuse. She presented to PCP's office on 10/10/22 for evaluation of a 1-week history of cough and shortness of breath as well as worsening R toe pain. She was ultimately sent to [facility] ED for further evaluation, where she was found to be positive for COVID-19. She is not vaccinated. CXR showed cardiomegaly with mild pulmonary vascular congestion / pulmonary edema. CTA chest negative for PE. Did show an incidental 4mm solid, non-calcified subpleural nodule in the LLL (follow up CT in 1 year per Fleischner criteria)    O2 saturation 88% in the ED. She required up to 6L to maintain O2 saturations during her hospitalization. She was admitted to the hospital medicine service. She was diuresed and treated with IV Remdesivir and Dexamethasone. She improved and was ultimately weaned to 3.5L NC prior to discharge. Due to improvement and loss of IV access, Remdesivir discontinued after 4 doses. We will continue Dexamethasone to complete a total 10 days of therapy    Encouraged tobacco cessation. Patient reports she is cutting back and goes through a pack in about 2  weeks. She declined nicotine patch - says insurance will not cover them. Arrange new nebulizer machine for patient - she reports her machine at home is not working.     At time of DC, blood pressure low-normal. Lasix and Lisinopril were held during this hospitalization. Discussed with patient that we will continue holding her Lisinopril and resume Lasix at 20mg (home dose 40mg daily). Follow up with PCP over telemedicine in 1 week or in person after out of isolation. Keep outpatient pulmonology appointment.     Isolation 20 days from first positive test. 10/10-10/30/22. First day out of isolation 10/31/22    Day of Discharge     HPI:   Sitting up in bed. Overall, feeling better. No significant dyspnea. Coughing up white/yellow sputum. No chest pain, abdominal pain, nausea or vomiting. Feels comfortable going home.     Review of Systems  Gen- No fevers, chills  CV- No chest pain, palpitations  Resp- as above   GI- No N/V/D, abd pain    Vital Signs:   Temp:  [97.4 °F (36.3 °C)-98.4 °F (36.9 °C)] 97.7 °F (36.5 °C)  Heart Rate:  [] 75  Resp:  [18] 18  BP: ()/(58-93) 101/62  Flow (L/min):  [4] 4    Physical Exam:  Constitutional: No acute distress, awake, alert  HENT: NCAT, mucous membranes moist  Respiratory: Expiratory wheezing bilaterally without rales or rhonchi, normal respiratory effort on room air  Cardiovascular: RRR, no murmurs, rubs, or gallops  Gastrointestinal: Positive bowel sounds, soft, nontender, nondistended  Musculoskeletal: Trace bilateral ankle edema  Psychiatric: Appropriate affect, cooperative  Neurologic: Oriented x 3, moves all extremities spontaneously, speech clear  Skin: No rashes    Pertinent  and/or Most Recent Results     LAB RESULTS:      Lab 10/13/22  0407 10/12/22  1759 10/12/22  0431 10/11/22  0628 10/10/22  1548   WBC 10.21  --  11.71* 10.65 10.28   HEMOGLOBIN 12.0  --  13.7 12.8 12.6   HEMATOCRIT 40.0  --  42.9 42.6 41.6   PLATELETS 377  --  364 357 363   NEUTROS ABS  --    --   --  9.45* 7.65*   IMMATURE GRANS (ABS)  --   --   --  0.08* 0.08*   LYMPHS ABS  --   --   --  0.72 1.54   MONOS ABS  --   --   --  0.38 0.77   EOS ABS  --   --   --  0.00 0.20   MCV 89.1  --  87.9 89.3 88.3   CRP  --  2.52*  --   --  6.37*    219*  --  224* 446*   D DIMER QUANT  --   --   --   --  1.03*         Lab 10/13/22  0407 10/12/22  0431 10/11/22  0628 10/10/22  1548   SODIUM 137 134* 134* 137   POTASSIUM 4.5 4.8 4.9 4.9   CHLORIDE 97* 94* 93* 97*   CO2 34.0* 31.0* 30.0* 33.0*   ANION GAP 6.0 9.0 11.0 7.0   BUN 16 23* 15 16   CREATININE 0.56* 0.62 0.60 0.63   EGFR 105.9 103.4 104.2 103.0   GLUCOSE 136* 152* 159* 163*   CALCIUM 8.8 9.4 8.9 9.1   MAGNESIUM  --   --  2.2  --          Lab 10/13/22  0407 10/12/22  0431 10/11/22  0628 10/10/22  1548   TOTAL PROTEIN 6.2 6.8 7.0 7.5   ALBUMIN 3.60 3.90 3.70 3.80   GLOBULIN  --   --  3.3 3.7   ALT (SGPT) 19 23 29 34*   AST (SGOT) 12 13 16 27   BILIRUBIN 0.2 0.2 0.2 0.3   BILIRUBIN DIRECT <0.2 <0.2 <0.2  --    ALK PHOS 101 119* 130* 147*         Lab 10/10/22  1548   PROBNP 161.4   TROPONIN T <0.010             Lab 10/12/22  1759   FERRITIN 144.90         Lab 10/10/22  1643   PH, ARTERIAL 7.312*   PCO2, ARTERIAL 71.3*   PO2 ART 62.5*   FIO2 40   HCO3 ART 36.1*   BASE EXCESS ART 7.3*   CARBOXYHEMOGLOBIN 4.8*     Brief Urine Lab Results     None        Microbiology Results (last 10 days)     Procedure Component Value - Date/Time    COVID PRE-OP / PRE-PROCEDURE SCREENING ORDER (NO ISOLATION) - Swab, Nasopharynx [[id number]]  (Abnormal) Collected: 10/10/22 1551    Lab Status: Final result Specimen: Swab from Nasopharynx Updated: 10/10/22 1648    Narrative:      The following orders were created for panel order COVID PRE-OP / PRE-PROCEDURE SCREENING ORDER (NO ISOLATION) - Swab, Nasopharynx.  Procedure                               Abnormality         Status                     ---------                               -----------         ------                      COVID-19 and FLU A/B PCR...[[id number]]  Abnormal            Final result                 Please view results for these tests on the individual orders.    COVID-19 and FLU A/B PCR - Swab, Nasopharynx [[id number]]  (Abnormal) Collected: 10/10/22 1551    Lab Status: Final result Specimen: Swab from Nasopharynx Updated: 10/10/22 1648     COVID19 Detected     Influenza A PCR Not Detected     Influenza B PCR Not Detected    Narrative:      Fact sheet for providers: https://www.fda.gov/media/076018/download    Fact sheet for patients: https://www.fda.gov/media/911033/download    Test performed by PCR.  Influenza A and Influenza B negative results should be considered presumptive in samples that have a positive SARS-CoV-2 result.    Competitive inhibition studies showed that SARS-CoV-2 virus, when present at concentrations above 3.6E+04 copies/mL, can inhibit the detection and amplification of influenza A and influenza B virus RNA if present at or below 1.8E+02 copies/mL or 4.9E+02 copies/mL, respectively, and may lead to false negative influenza virus results. If co-infection with influenza A or influenza B virus is suspected in samples with a positive SARS-CoV-2 result, the sample should be re-tested with another FDA cleared, approved, or authorized influenza test, if influenza virus detection would change clinical management.        XR Chest 1 View    Result Date: 10/13/2022   DATE OF EXAM: 10/13/2022 2:57 AM  PROCEDURE: XR CHEST 1 VW-  INDICATIONS: DYSPNEA, ON EXERTION  COMPARISON: 10/12/2022 and prior  TECHNIQUE: Portable Chest  FINDINGS:  Study is limited by overlying support and monitoring apparatus.  Heart size is stable. Pulmonary vascularity is mildly congested and indistinct diffuse increased groundglass and interstitial opacities with a fine nodular appearance is noted with a bibasilar predominance. No significant pleural effusion or focal consolidation is noted. Osseous structures are grossly unremarkable in  appearance.      IMPRESSION : Diffuse interstitial opacities suggesting acute on chronic change. Findings suggest underlying interstitial edema.  Accounting for differences in technique no great change from the comparison[  This report was finalized on 10/13/2022 6:41 AM by Adam Taylor.      XR Chest 1 View    Result Date: 10/12/2022  DATE OF EXAM: 10/12/2022 5:28 AM  PROCEDURE: XR CHEST 1 VW-  INDICATIONS: DYSPNEA, ON EXERTION  COMPARISON: 10/10/2022  TECHNIQUE: Single radiographic AP view of the chest was obtained.  FINDINGS: Unchanged diffuse interstitial prominence with appearance suggesting vascular congestion. There is no enlarging effusion or distinct pneumothorax. Unchanged cardiac enlargement. No new focal airspace consolidation.      Unchanged diffuse interstitial prominence with appearance suggesting vascular congestion. There is no enlarging effusion or distinct pneumothorax. Unchanged cardiac enlargement. No new focal airspace consolidation.  This report was finalized on 10/12/2022 8:26 AM by John Gonzalez.      XR Chest 1 View    Result Date: 10/10/2022   DATE OF EXAM: 10/10/2022 3:32 PM  PROCEDURE: XR CHEST 1 VW-  INDICATIONS: CHF/COPD protocol  COMPARISON: 11/18/2021 and prior  TECHNIQUE: Portable Chest  FINDINGS:  Study limited by body habitus and overlying support and monitoring apparatus  The heart size is mildly enlarged. Pulmonary vascularity is congested and indistinct. Increased groundglass and interstitial opacities noted with a perihilar and bibasilar predominance.      IMPRESSION : Cardiomegaly with mild pulmonary vascular congestion, pulmonary edema.[  This report was finalized on 10/10/2022 3:43 PM by Adam Taylor.      Duplex Venous Upper Extremity - Left CAR    Result Date: 10/13/2022  •  Normal left upper extremity venous duplex scan.     CT Angiogram Chest    Result Date: 10/10/2022  DATE OF EXAM: 10/10/2022 7:25 PM  PROCEDURE: CT ANGIOGRAM CHEST-  INDICATIONS: Respiratory  failure, positive D-dimer; J96.21-Acute and chronic respiratory failure with hypoxia; J96.22-Acute and chronic respiratory failure with hypercapnia; J45.901-Unspecified asthma with (acute) exacerbation; U07.1-COVID-19  COMPARISON: CTA chest 11/18/2021  TECHNIQUE: Contiguous axial imaging was obtained from the thoracic inlet through the upper abdomen following the intravenous administration of 85 mL of Isovue 370. Reconstructed coronal and sagittal images were also obtained. Automated exposure control and iterative reconstruction methods were used. Rotational 3-D MIP reformat of the pulmonary arterial vasculature was created at independent workstation.  The radiation dose reduction device was turned on for each scan per the ALARA (As Low as Reasonably Achievable) protocol.  FINDINGS:  Normal appearance of the pulmonary arteries without filling defect to suggest pulmonary embolism. Mild ectasia of the thoracic aorta. Unremarkable appearance of the cardiac chambers. No pericardial effusion. No significant coronary artery calcifications. There are shotty mediastinal lymph nodes without bulky or clearly pathologic mediastinal or hilar adenopathy. Redemonstration of heterogeneous multinodular thyroid gland. The chest wall soft tissues are normal. No axillary lymphadenopathy. The trachea and mainstem bronchi are patent. The smaller peripheral airways are unremarkable. There is moderate centrilobular and paraseptal emphysema, worst in the upper lobes. No focal consolidation or evidence of active infectious or inflammatory process. No lung mass. There is a subpleural 4 mm solid noncalcified nodule in the left lung base (series 6 image 82). No acute osseous findings. Similar diffuse thickening of the left adrenal gland with otherwise unremarkable appearance of the partially imaged upper abdomen.      No evidence of pulmonary embolism. No acute intrathoracic findings. Redemonstration of emphysema.  Incidental note of 4 mm  solid noncalcified subpleural nodule in the left lower lobe. Per Fleischner criteria, in a high risk patient, CT of the chest in one year can be performed for follow-up.  This report was finalized on 10/10/2022 8:08 PM by Kar Andrews MD.      Results for orders placed during the hospital encounter of 10/10/22    Duplex Venous Upper Extremity - Left CAR    Interpretation Summary  •  Normal left upper extremity venous duplex scan.    Results for orders placed during the hospital encounter of 11/18/21    Adult Transthoracic Echo Complete w/ Color, Spectral and Contrast if necessary per protocol    Interpretation Summary  · Left ventricular ejection fraction appears to be 61 - 65%. Left ventricular systolic function is normal.  · Left ventricular diastolic function was normal.  · The right ventricular cavity is mildly dilated.  · Moderate tricuspid valve regurgitation is present.  · Estimated right ventricular systolic pressure from tricuspid regurgitation is mildly elevated (35-45 mmHg).  · Mild pulmonary hypertension is present.    Discharge Details        Discharge Medications      New Medications      Instructions Start Date   dexamethasone 6 MG tablet  Commonly known as: DECADRON   6 mg, Oral, Daily, Start on Saturday 10/15/22   Start Date: October 15, 2022        Changes to Medications      Instructions Start Date   furosemide 40 MG tablet  Commonly known as: LASIX  What changed: how much to take   20 mg, Oral, Daily      potassium chloride 10 MEQ CR capsule  Commonly known as: MICRO-K  What changed:   · how much to take  · how to take this  · when to take this  · additional instructions   Take 1 capsule by mouth daily when you take Furosemide (Lasix)      traZODone 50 MG tablet  Commonly known as: DESYREL  What changed:   · how much to take  · when to take this  · reasons to take this    mg, Oral, Nightly PRN         Continue These Medications      Instructions Start Date   albuterol (2.5 MG/3ML) 0.083%  nebulizer solution  Commonly known as: PROVENTIL   2.5 mg, Nebulization, Every 4 Hours PRN      Anoro Ellipta 62.5-25 MCG/INH aerosol powder  inhaler  Generic drug: umeclidinium-vilanterol   1 puff, Inhalation, Daily - RT      fenofibrate 160 MG tablet   160 mg, Oral, Daily      metFORMIN 500 MG tablet  Commonly known as: GLUCOPHAGE   500 mg, Oral, Daily With Breakfast         Stop These Medications    lisinopril 20 MG tablet  Commonly known as: PRINIVIL,ZESTRIL          No Known Allergies    Discharge Disposition:  Home or Self Care    Diet:  Hospital:  Diet Order   Procedures   • Diet Regular; Consistent Carbohydrate     Activity: As tolerated      CODE STATUS:    Code Status and Medical Interventions:   Ordered at: 10/10/22 1840     Level Of Support Discussed With:    Patient     Code Status (Patient has no pulse and is not breathing):    CPR (Attempt to Resuscitate)     Medical Interventions (Patient has pulse or is breathing):    Full Support     Future Appointments   Date Time Provider Department Center   11/28/2022 11:00 AM [facility]   11/28/2022 11:30 AM Veronica Rothman APRN [facility]     Additional Instructions for the Follow-ups that You Need to Schedule     Discharge Follow-up with PCP   As directed       Currently Documented PCP:    Lulu Love APRN    PCP Phone Number:    [phone]     Follow Up Details: Telemedicine in 1 week (if office does telemed) OR in person after 10/30/22             Mirela Jones PA-C  10/14/22    Time Spent on Discharge:  I spent 40 minutes on this discharge activity which included: face-to-face encounter with the patient, reviewing the data in the system, coordination of the care with the nursing staff as well as consultants, documentation, and entering orders.

## 2022-10-14 NOTE — PLAN OF CARE
Goal Outcome Evaluation:  Plan of Care Reviewed With: patient        Progress: improving  Outcome Evaluation: No events through the night. She remains on 4l/NC and NC/BIPAP at . She complains of occasional right foot pain controlled with PRN PO pain medication. NSR, maribeliue with Plan of care,

## 2022-10-14 NOTE — DISCHARGE PLACEMENT REQUEST
"Addy Lee (56 y.o. Female)     Ysabel Henning, RN  [phone]    Date of Birth   1965    Social Security Number       Address   [address] [place], [place] [address]    Home Phone   [phone]    MRN   [id number]       Orthodox   Orthodox    Marital Status                               Admission Date   10/10/22    Admission Type   Emergency    Admitting Provider   Otilio Owens MD    Attending Provider   Otilio Owens MD    Department, Room/Bed   [facility] 6B, N628/1       Discharge Date       Discharge Disposition   Home or Self Care    Discharge Destination                               Attending Provider: Otilio Owens MD    Allergies: No Known Allergies    Isolation: Contact Air   Infection: COVID (confirmed) (10/10/22)   Code Status: CPR    Ht: 172.7 cm (68\")   Wt: 120 kg (265 lb)    Admission Cmt: None   Principal Problem: Acute on chronic respiratory failure with hypoxia and hypercapnia (HCC) [J96.21,J96.22]                 Active Insurance as of 10/10/2022     Primary Coverage     Payor Plan Insurance Group Employer/Plan Group    [employer] [place] MED[place]ARE REPLACEMENT WELLCARE MEDICARE REPLACEMENT      Payor Plan Address Payor Plan Phone Number Payor Plan Fax Number Effective Dates    [address] [address] [phone]  9/1/2020 - None Entered    [facility] [address]       Subscriber Name Subscriber Birth Date Member ID       ADDY LEE 1965 [id number]           Secondary Coverage     Payor Plan Insurance Group Employer/Plan Group    [place]      Payor Plan Address Payor Plan Phone Number Payor Plan Fax Number Effective Dates    [address] [phone]  1/1/2020 - None Entered    [facility] [id number]       Subscriber Name Subscriber Birth Date Member ID       ADDY LEE 1965 [id number]                 Emergency Contacts      (Rel.) Home Phone Work Phone Mobile Phone    ANDREA AIKEN (Daughter) " "[phone] -- [phone]           [facility] 6B  [address] [facility] [address]  Dept. Phone:  [phone]  Dept. Fax:  [phone] Date Ordered: Oct 14, 2022         Patient:  Jovanna Ching MRN:  [id number]   [address] [facility] [address] :  1965  SSN:    Phone: [phone] Sex:  F     Weight: 120 kg (265 lb)         Ht Readings from Last 1 Encounters:   10/10/22 172.7 cm (68\")         Home Nebulizer          (Order ID: [id number])    Diagnosis:  Acute on chronic respiratory failure with hypoxia and hypercapnia (HCC) (J96.21,J96.22 [ICD-10-CM] 518.84,786.09,799.02 [ICD-9-CM])   Quantity:  1     Nebulizer Equipment:  Nebulizer w/ Compressor  Nebulizer Accessories:  Nebulizer Kit - Administration Set, Non-Disposable  Length of Need (99 Months = Lifetime): 99 Months = Lifetime        Authorizing Provider's Phone: [phone]  Verbal Order Mode: Verbal with readback   Authorizing Provider: Otilio Owens MD  Authorizing Provider's NPI: [id number]     Order Entered By: Ysabel Henning RN 10/14/2022 11:18 AM     Electronically signed by:  Otilio Owens MD            History & Physical      Kishor Chapa III, DO at 10/10/22 1845              Psychiatric Medicine Services  HISTORY AND PHYSICAL    Patient Name: Jovanna Ching  : 1965  MRN: [id number]  Primary Care Physician: Lulu Love APRN  Date of admission: 10/10/2022      Subjective   Subjective     Chief Complaint:   Cough, shortness of breath    HPI:  Jovanna Ching is a 56 y.o. female who states that she began to feel better \"a week ago.\"  She specifically cites shortness of breath and cough as her initial symptoms.  She states that she came to the ER today because she had increased right toe pain, but nursing her PCP the office today, because of the cough and shortness of breath they suggested she come to the ER for further evaluation for " "that.  COVID test was positive here.  She also C/of dyspnea on exertion for the last 2 weeks and she feels like her chronically swollen bilateral lower extremities \"are a little worse\" over the last week.  She also confirms that she feels like she is wheezing for the last 24 hours.  Cough is nonproductive.  She denies chest pain, anosmia/ageusia, fever/chills, nausea/emesis, abdominal pain, bowel habit change, focal neurologic deficit beyond baseline, or syncope.  Medical history significant for hypertension, hyperlipidemia, asthma, COPD, type 2 diabetes.      Review of Systems   Constitutional: Negative.    HENT: Negative.    Eyes: Negative.    Respiratory: Positive for cough, shortness of breath and wheezing.    Cardiovascular: Positive for leg swelling.   Gastrointestinal: Negative.    Endocrine: Negative.    Genitourinary: Negative.    Musculoskeletal: Negative.    Skin: Negative.    Allergic/Immunologic: Negative.    Neurological: Negative.    Hematological: Negative.    Psychiatric/Behavioral: Negative.           Personal History     Past Medical History:   Diagnosis Date   • Arthritis    • Asthma    • Broken leg    • CHF (congestive heart failure) ([facility])    • Chronic pain    • COPD (chronic obstructive pulmonary disease) ([facility])    • Diabetes mellitus ([facility])    • GERD (gastroesophageal reflux disease)    • Hypertension    • Swelling              Past Surgical History:   Procedure Laterality Date   • BACK SURGERY     •  SECTION     • VASCULAR SURGERY         Family History: Father had alcoholism.  She states she is otherwise unfamiliar with family medical history.    Social History:  reports that she has been smoking. She has a 32.00 pack-year smoking history. She has never used smokeless tobacco. She reports that she does not use drugs.  Social History     Social History Narrative   • Not on file       Medications:  Available home medication information reviewed.  (Not in a hospital admission)      No " Known Allergies    Objective   Objective     Vital Signs:   Temp:  [98 °F (36.7 °C)] 98 °F (36.7 °C)  Heart Rate:  [] 84  Resp:  [20] 20  BP: (112-170)/(78-94) 112/94  Flow (L/min):  [4.5-5] 5       Physical Exam   Constitutional: Awake, alert, NAD, pleasant.  Eyes: PERRLA, sclerae anicteric, no conjunctival injection  HENT: NCAT, mucous membranes moist  Neck: Supple, no thyromegaly, no lymphadenopathy, trachea midline  Respiratory: Coarse breath sounds bilaterally, nonlabored respirations   Cardiovascular: RRR, no murmurs, rubs, or gallops, palpable pedal pulses bilaterally  Gastrointestinal: Positive bowel sounds, soft, nontender, nondistended  Musculoskeletal: 1+ bilateral ankle edema, no clubbing or cyanosis to extremities  Psychiatric: Appropriate affect, cooperative  Neurologic: Oriented x 3, strength symmetric in all extremities, Cranial Nerves grossly intact to confrontation, speech clear  Skin: No rashes, normal turgor.    Result Review:  I have personally reviewed the results from the time of this admission to 10/10/2022 18:45 EDT and agree with these findings:  [x]  Laboratory list / accordion  []  Microbiology  [x]  Radiology  [x]  EKG/Telemetry   []  Cardiology/Vascular   []  Pathology  []  Old records  []  Other:  Most notable findings include: On ABG, pH is 7.312, PCO2 71.3, PO2 62.5, possible venous gas.  D-dimer 1.03.  Reviewed all labs.  I personally reviewed chest x-ray which by my read shows mild increased vascular markings, some cephalization, cardiomegaly, likely mild edema.  No infiltrate.  CT chest is still pending at the time of this note.  I personally reviewed EKG which by my read shows normal sinus rhythm, normal axis, ventricular rate approximately 80 bpm, nonspecific ST/T wave changes.        LAB RESULTS:      Lab 10/10/22  1548   WBC 10.28   HEMOGLOBIN 12.6   HEMATOCRIT 41.6   PLATELETS 363   NEUTROS ABS 7.65*   IMMATURE GRANS (ABS) 0.08*   LYMPHS ABS 1.54   MONOS ABS 0.77   EOS  ABS 0.20   MCV 88.3   D DIMER QUANT 1.03*         Lab 10/10/22  1548   SODIUM 137   POTASSIUM 4.9   CHLORIDE 97*   CO2 33.0*   ANION GAP 7.0   BUN 16   CREATININE 0.63   EGFR 103.0   GLUCOSE 163*   CALCIUM 9.1         Lab 10/10/22  1548   TOTAL PROTEIN 7.5   ALBUMIN 3.80   GLOBULIN 3.7   ALT (SGPT) 34*   AST (SGOT) 27   BILIRUBIN 0.3   ALK PHOS 147*         Lab 10/10/22  1548   PROBNP 161.4   TROPONIN T <0.010                 Lab 10/10/22  1643   PH, ARTERIAL 7.312*   PCO2, ARTERIAL 71.3*   PO2 ART 62.5*   FIO2 40   HCO3 ART 36.1*   BASE EXCESS ART 7.3*   CARBOXYHEMOGLOBIN 4.8*         Microbiology Results (last 10 days)     Procedure Component Value - Date/Time    COVID PRE-OP / PRE-PROCEDURE SCREENING ORDER (NO ISOLATION) - Swab, Nasopharynx [[id number]]  (Abnormal) Collected: 10/10/22 1551    Lab Status: Final result Specimen: Swab from Nasopharynx Updated: 10/10/22 1648    Narrative:      The following orders were created for panel order COVID PRE-OP / PRE-PROCEDURE SCREENING ORDER (NO ISOLATION) - Swab, Nasopharynx.  Procedure                               Abnormality         Status                     ---------                               -----------         ------                     COVID-19 and FLU A/B PCR...[[id number]]  Abnormal            Final result                 Please view results for these tests on the individual orders.    COVID-19 and FLU A/B PCR - Swab, Nasopharynx [[id number]]  (Abnormal) Collected: 10/10/22 1551    Lab Status: Final result Specimen: Swab from Nasopharynx Updated: 10/10/22 1648     COVID19 Detected     Influenza A PCR Not Detected     Influenza B PCR Not Detected    Narrative:      Fact sheet for providers: https://www.fda.gov/media/125518/download    Fact sheet for patients: https://www.fda.gov/media/952175/download    Test performed by PCR.  Influenza A and Influenza B negative results should be considered presumptive in samples that have a positive SARS-CoV-2  result.    Competitive inhibition studies showed that SARS-CoV-2 virus, when present at concentrations above 3.6E+04 copies/mL, can inhibit the detection and amplification of influenza A and influenza B virus RNA if present at or below 1.8E+02 copies/mL or 4.9E+02 copies/mL, respectively, and may lead to false negative influenza virus results. If co-infection with influenza A or influenza B virus is suspected in samples with a positive SARS-CoV-2 result, the sample should be re-tested with another FDA cleared, approved, or authorized influenza test, if influenza virus detection would change clinical management.          XR Chest 1 View    Result Date: 10/10/2022   DATE OF EXAM: 10/10/2022 3:32 PM  PROCEDURE: XR CHEST 1 VW-  INDICATIONS: CHF/COPD protocol  COMPARISON: 11/18/2021 and prior  TECHNIQUE: Portable Chest  FINDINGS:  Study limited by body habitus and overlying support and monitoring apparatus  The heart size is mildly enlarged. Pulmonary vascularity is congested and indistinct. Increased groundglass and interstitial opacities noted with a perihilar and bibasilar predominance.      Impression: IMPRESSION : Cardiomegaly with mild pulmonary vascular congestion, pulmonary edema.[  This report was finalized on 10/10/2022 3:43 PM by Adam Taylor.        Results for orders placed during the hospital encounter of 11/18/21    Adult Transthoracic Echo Complete w/ Color, Spectral and Contrast if necessary per protocol    Interpretation Summary  · Left ventricular ejection fraction appears to be 61 - 65%. Left ventricular systolic function is normal.  · Left ventricular diastolic function was normal.  · The right ventricular cavity is mildly dilated.  · Moderate tricuspid valve regurgitation is present.  · Estimated right ventricular systolic pressure from tricuspid regurgitation is mildly elevated (35-45 mmHg).  · Mild pulmonary hypertension is present.      Assessment & Plan   Assessment & Plan     Active  Hospital Problems    Diagnosis  POA   • **Acute on chronic respiratory failure with hypoxia and hypercapnia (HCC) [J96.21, J96.22]  Yes   • COVID-19 virus detected [U07.1]  Yes       56F with covid 19, acute respiratory failure with hypoxia    COVID-19  Acute hypoxic respiratory failure  History of asthma  Acute exacerbation of COPD  - Start daily IV remdesivir.  - Start daily IV dexamethasone.  - Monitor laboratory biomarkers.  - As needed ProAir inhaler.  - Continue O2 currently via BiPAP, can change to nasal cannula when possible to maintain saturations >92%.  - Lovenox 1 mg/kg SQ twice daily (full dose) for anticoagulation.  - Hold home Anoro Ellipta inhaler.    Type 2 diabetes  - We will hold home metformin for now.  - Add Levemir plus SSI coverage.    Hypertension  - Continue home lisinopril.    Hyperlipidemia  - She states she has taken a statin in the past but is not sure if he does now; there is not a statin listed on her home medication list.  - Continue fenofibrate.    Tobacco abuse  - Cessation advised/counseled.  - She requests nicotine patch which will be ordered.        DVT prophylaxis: Lovenox as above, full dose      CODE STATUS: Full  Code Status and Medical Interventions:   Ordered at: 10/10/22 5310     Level Of Support Discussed With:    Patient     Code Status (Patient has no pulse and is not breathing):    CPR (Attempt to Resuscitate)     Medical Interventions (Patient has pulse or is breathing):    Full Support         Kishor Chapa III, DO  10/10/22    Electronically signed by Kishor Chapa III, DO at 10/10/22 2012

## 2022-10-15 ENCOUNTER — READMISSION MANAGEMENT (OUTPATIENT)
Dept: CALL CENTER | Facility: HOSPITAL | Age: 57
End: 2022-10-15

## 2022-10-15 NOTE — PAYOR COMM NOTE
"Addy Lee (56 y.o. Female)     Ref#- [id number]    Tiera Washburn, SHAUNA  Utilization Review  [phone]  [phone]      Date of Birth   1965    Social Security Number       Address   [address] [place] [address]    Home Phone   [phone]    MRN   [id number]       Islam   [employer]    Marital Status                               Admission Date   10/10/22    Admission Type   Emergency    Admitting Provider   Otilio Owens MD    Attending Provider       Department, Room/Bed   [facility] 6B, N628/1       Discharge Date   10/14/2022    Discharge Disposition   Home or Self Care    Discharge Destination                               Attending Provider: (none)   Allergies: No Known Allergies    Isolation: None   Infection: COVID (confirmed) (10/10/22)   Code Status: Prior    Ht: 172.7 cm (68\")   Wt: 120 kg (265 lb)    Admission Cmt: None   Principal Problem: Acute on chronic respiratory failure with hypoxia and hypercapnia (HCC) [J96.21,J96.22]                 Active Insurance as of 10/10/2022     Primary Coverage     Payor Plan Insurance Group Employer/Plan Group    [employer] [place] MEDICARE REPLACEMENT WELL[employer]ARE MEDICARE REPLACEMENT      Payor Plan Address Payor Plan Phone Number Payor Plan Fax Number Effective Dates    [address] [address] [phone]  9/1/2020 - None Entered    [facility] [id number]       Subscriber Name Subscriber Birth Date Member ID       ADDY LEE 1965 [id number]           Secondary Coverage     Payor Plan Insurance Group Employer/Plan Group    [place]      Payor Plan Address Payor Plan Phone Number Payor Plan Fax Number Effective Dates    [address] [phone]  1/1/2020 - None Entered    [facility] [address]       Subscriber Name Subscriber Birth Date Member ID       ADDY LEE 1965 [id number]                 Emergency Contacts      (Rel.) Home Phone Work Phone Mobile " Phone    ANDREA AIKEN (Daughter) [phone] -- [phone]               Discharge Summary      Mirela Jones PA-C at 10/14/22 1022     Attestation signed by Otilio Owens MD at 10/14/22 1414    I have reviewed this documentation and agree.                      [facility] Medicine Services  DISCHARGE SUMMARY    Patient Name: Jovanna Ching  : 1965  MRN: [id number]    Date of Admission: 10/10/2022 12:49 PM  Date of Discharge: 10/14/2022  Primary Care Physician: Lulu Love APRN    Hospital Course     Presenting Problem:   Acute on chronic respiratory failure with hypoxia and hypercapnia (HCC) [J96.21, J96.22]    Active Hospital Problems    Diagnosis  POA   • **Acute on chronic respiratory failure with hypoxia and hypercapnia (HCC) [J96.21, J96.22]  Yes   • COVID-19 virus detected [U07.1]  Yes      Resolved Hospital Problems   No resolved problems to display.      Hospital Course:  Jovanna Ching is a 56 y.o. female with PMH significant for HTN, HLD, non-insulin dependent DMII and chronic hypoxic respiratory failure secondary to COPD (baseline 3L NC) with ongoing tobacco abuse. She presented to PCP's office on 10/10/22 for evaluation of a 1-week history of cough and shortness of breath as well as worsening R toe pain. She was ultimately sent to [facility] ED for further evaluation, where she was found to be positive for COVID-19. She is not vaccinated. CXR showed cardiomegaly with mild pulmonary vascular congestion / pulmonary edema. CTA chest negative for PE. Did show an incidental 4mm solid, non-calcified subpleural nodule in the LLL (follow up CT in 1 year per Fleischner criteria)    O2 saturation 88% in the ED. She required up to 6L to maintain O2 saturations during her hospitalization. She was admitted to the hospital medicine service. She was diuresed and treated with IV Remdesivir and Dexamethasone. She improved and was ultimately weaned to 3.5L NC  prior to discharge. Due to improvement and loss of IV access, Remdesivir discontinued after 4 doses. We will continue Dexamethasone to complete a total 10 days of therapy    Encouraged tobacco cessation. Patient reports she is cutting back and goes through a pack in about 2 weeks. She declined nicotine patch - says insurance will not cover them. Arrange new nebulizer machine for patient - she reports her machine at home is not working.     At time of DC, blood pressure low-normal. Lasix and Lisinopril were held during this hospitalization. Discussed with patient that we will continue holding her Lisinopril and resume Lasix at 20mg (home dose 40mg daily). Follow up with PCP over telemedicine in 1 week or in person after out of isolation. Keep outpatient pulmonology appointment.     Isolation 20 days from first positive test. 10/10-10/30/22. First day out of isolation 10/31/22    Day of Discharge     HPI:   Sitting up in bed. Overall, feeling better. No significant dyspnea. Coughing up white/yellow sputum. No chest pain, abdominal pain, nausea or vomiting. Feels comfortable going home.     Review of Systems  Gen- No fevers, chills  CV- No chest pain, palpitations  Resp- as above   GI- No N/V/D, abd pain    Vital Signs:   Temp:  [97.4 °F (36.3 °C)-98.4 °F (36.9 °C)] 97.7 °F (36.5 °C)  Heart Rate:  [] 75  Resp:  [18] 18  BP: ()/(58-93) 101/62  Flow (L/min):  [4] 4    Physical Exam:  Constitutional: No acute distress, awake, alert  HENT: NCAT, mucous membranes moist  Respiratory: Expiratory wheezing bilaterally without rales or rhonchi, normal respiratory effort on room air  Cardiovascular: RRR, no murmurs, rubs, or gallops  Gastrointestinal: Positive bowel sounds, soft, nontender, nondistended  Musculoskeletal: Trace bilateral ankle edema  Psychiatric: Appropriate affect, cooperative  Neurologic: Oriented x 3, moves all extremities spontaneously, speech clear  Skin: No rashes    Pertinent  and/or Most Recent  Results     LAB RESULTS:      Lab 10/13/22  0407 10/12/22  1759 10/12/22  0431 10/11/22  0628 10/10/22  1548   WBC 10.21  --  11.71* 10.65 10.28   HEMOGLOBIN 12.0  --  13.7 12.8 12.6   HEMATOCRIT 40.0  --  42.9 42.6 41.6   PLATELETS 377  --  364 357 363   NEUTROS ABS  --   --   --  9.45* 7.65*   IMMATURE GRANS (ABS)  --   --   --  0.08* 0.08*   LYMPHS ABS  --   --   --  0.72 1.54   MONOS ABS  --   --   --  0.38 0.77   EOS ABS  --   --   --  0.00 0.20   MCV 89.1  --  87.9 89.3 88.3   CRP  --  2.52*  --   --  6.37*    219*  --  224* 446*   D DIMER QUANT  --   --   --   --  1.03*         Lab 10/13/22  0407 10/12/22  0431 10/11/22  0628 10/10/22  1548   SODIUM 137 134* 134* 137   POTASSIUM 4.5 4.8 4.9 4.9   CHLORIDE 97* 94* 93* 97*   CO2 34.0* 31.0* 30.0* 33.0*   ANION GAP 6.0 9.0 11.0 7.0   BUN 16 23* 15 16   CREATININE 0.56* 0.62 0.60 0.63   EGFR 105.9 103.4 104.2 103.0   GLUCOSE 136* 152* 159* 163*   CALCIUM 8.8 9.4 8.9 9.1   MAGNESIUM  --   --  2.2  --          Lab 10/13/22  0407 10/12/22  0431 10/11/22  0628 10/10/22  1548   TOTAL PROTEIN 6.2 6.8 7.0 7.5   ALBUMIN 3.60 3.90 3.70 3.80   GLOBULIN  --   --  3.3 3.7   ALT (SGPT) 19 23 29 34*   AST (SGOT) 12 13 16 27   BILIRUBIN 0.2 0.2 0.2 0.3   BILIRUBIN DIRECT <0.2 <0.2 <0.2  --    ALK PHOS 101 119* 130* 147*         Lab 10/10/22  1548   PROBNP 161.4   TROPONIN T <0.010             Lab 10/12/22  1759   FERRITIN 144.90         Lab 10/10/22  1643   PH, ARTERIAL 7.312*   PCO2, ARTERIAL 71.3*   PO2 ART 62.5*   FIO2 40   HCO3 ART 36.1*   BASE EXCESS ART 7.3*   CARBOXYHEMOGLOBIN 4.8*     Brief Urine Lab Results     None        Microbiology Results (last 10 days)     Procedure Component Value - Date/Time    COVID PRE-OP / PRE-PROCEDURE SCREENING ORDER (NO ISOLATION) - Swab, Nasopharynx [[id number]]  (Abnormal) Collected: 10/10/22 1551    Lab Status: Final result Specimen: Swab from Nasopharynx Updated: 10/10/22 1648    Narrative:      The following orders were created  for panel order COVID PRE-OP / PRE-PROCEDURE SCREENING ORDER (NO ISOLATION) - Swab, Nasopharynx.  Procedure                               Abnormality         Status                     ---------                               -----------         ------                     COVID-19 and FLU A/B PCR...[[id number]]  Abnormal            Final result                 Please view results for these tests on the individual orders.    COVID-19 and FLU A/B PCR - Swab, Nasopharynx [[id number]]  (Abnormal) Collected: 10/10/22 1551    Lab Status: Final result Specimen: Swab from Nasopharynx Updated: 10/10/22 1648     COVID19 Detected     Influenza A PCR Not Detected     Influenza B PCR Not Detected    Narrative:      Fact sheet for providers: https://www.fda.gov/media/766497/download    Fact sheet for patients: https://www.fda.gov/media/459539/download    Test performed by PCR.  Influenza A and Influenza B negative results should be considered presumptive in samples that have a positive SARS-CoV-2 result.    Competitive inhibition studies showed that SARS-CoV-2 virus, when present at concentrations above 3.6E+04 copies/mL, can inhibit the detection and amplification of influenza A and influenza B virus RNA if present at or below 1.8E+02 copies/mL or 4.9E+02 copies/mL, respectively, and may lead to false negative influenza virus results. If co-infection with influenza A or influenza B virus is suspected in samples with a positive SARS-CoV-2 result, the sample should be re-tested with another FDA cleared, approved, or authorized influenza test, if influenza virus detection would change clinical management.        XR Chest 1 View    Result Date: 10/13/2022   DATE OF EXAM: 10/13/2022 2:57 AM  PROCEDURE: XR CHEST 1 VW-  INDICATIONS: DYSPNEA, ON EXERTION  COMPARISON: 10/12/2022 and prior  TECHNIQUE: Portable Chest  FINDINGS:  Study is limited by overlying support and monitoring apparatus.  Heart size is stable. Pulmonary vascularity is  mildly congested and indistinct diffuse increased groundglass and interstitial opacities with a fine nodular appearance is noted with a bibasilar predominance. No significant pleural effusion or focal consolidation is noted. Osseous structures are grossly unremarkable in appearance.      IMPRESSION : Diffuse interstitial opacities suggesting acute on chronic change. Findings suggest underlying interstitial edema.  Accounting for differences in technique no great change from the comparison[  This report was finalized on 10/13/2022 6:41 AM by Adam Taylor.      XR Chest 1 View    Result Date: 10/12/2022  DATE OF EXAM: 10/12/2022 5:28 AM  PROCEDURE: XR CHEST 1 VW-  INDICATIONS: DYSPNEA, ON EXERTION  COMPARISON: 10/10/2022  TECHNIQUE: Single radiographic AP view of the chest was obtained.  FINDINGS: Unchanged diffuse interstitial prominence with appearance suggesting vascular congestion. There is no enlarging effusion or distinct pneumothorax. Unchanged cardiac enlargement. No new focal airspace consolidation.      Unchanged diffuse interstitial prominence with appearance suggesting vascular congestion. There is no enlarging effusion or distinct pneumothorax. Unchanged cardiac enlargement. No new focal airspace consolidation.  This report was finalized on 10/12/2022 8:26 AM by John Gonzalez.      XR Chest 1 View    Result Date: 10/10/2022   DATE OF EXAM: 10/10/2022 3:32 PM  PROCEDURE: XR CHEST 1 VW-  INDICATIONS: CHF/COPD protocol  COMPARISON: 11/18/2021 and prior  TECHNIQUE: Portable Chest  FINDINGS:  Study limited by body habitus and overlying support and monitoring apparatus  The heart size is mildly enlarged. Pulmonary vascularity is congested and indistinct. Increased groundglass and interstitial opacities noted with a perihilar and bibasilar predominance.      IMPRESSION : Cardiomegaly with mild pulmonary vascular congestion, pulmonary edema.[  This report was finalized on 10/10/2022 3:43 PM by Adam  Brandon.      Duplex Venous Upper Extremity - Left CAR    Result Date: 10/13/2022  •  Normal left upper extremity venous duplex scan.     CT Angiogram Chest    Result Date: 10/10/2022  DATE OF EXAM: 10/10/2022 7:25 PM  PROCEDURE: CT ANGIOGRAM CHEST-  INDICATIONS: Respiratory failure, positive D-dimer; J96.21-Acute and chronic respiratory failure with hypoxia; J96.22-Acute and chronic respiratory failure with hypercapnia; J45.901-Unspecified asthma with (acute) exacerbation; U07.1-COVID-19  COMPARISON: CTA chest 11/18/2021  TECHNIQUE: Contiguous axial imaging was obtained from the thoracic inlet through the upper abdomen following the intravenous administration of 85 mL of Isovue 370. Reconstructed coronal and sagittal images were also obtained. Automated exposure control and iterative reconstruction methods were used. Rotational 3-D MIP reformat of the pulmonary arterial vasculature was created at independent workstation.  The radiation dose reduction device was turned on for each scan per the ALARA (As Low as Reasonably Achievable) protocol.  FINDINGS:  Normal appearance of the pulmonary arteries without filling defect to suggest pulmonary embolism. Mild ectasia of the thoracic aorta. Unremarkable appearance of the cardiac chambers. No pericardial effusion. No significant coronary artery calcifications. There are shotty mediastinal lymph nodes without bulky or clearly pathologic mediastinal or hilar adenopathy. Redemonstration of heterogeneous multinodular thyroid gland. The chest wall soft tissues are normal. No axillary lymphadenopathy. The trachea and mainstem bronchi are patent. The smaller peripheral airways are unremarkable. There is moderate centrilobular and paraseptal emphysema, worst in the upper lobes. No focal consolidation or evidence of active infectious or inflammatory process. No lung mass. There is a subpleural 4 mm solid noncalcified nodule in the left lung base (series 6 image 82). No acute  osseous findings. Similar diffuse thickening of the left adrenal gland with otherwise unremarkable appearance of the partially imaged upper abdomen.      No evidence of pulmonary embolism. No acute intrathoracic findings. Redemonstration of emphysema.  Incidental note of 4 mm solid noncalcified subpleural nodule in the left lower lobe. Per Fleischner criteria, in a high risk patient, CT of the chest in one year can be performed for follow-up.  This report was finalized on 10/10/2022 8:08 PM by Kar Andrews MD.      Results for orders placed during the hospital encounter of 10/10/22    Duplex Venous Upper Extremity - Left CAR    Interpretation Summary  •  Normal left upper extremity venous duplex scan.    Results for orders placed during the hospital encounter of 11/18/21    Adult Transthoracic Echo Complete w/ Color, Spectral and Contrast if necessary per protocol    Interpretation Summary  · Left ventricular ejection fraction appears to be 61 - 65%. Left ventricular systolic function is normal.  · Left ventricular diastolic function was normal.  · The right ventricular cavity is mildly dilated.  · Moderate tricuspid valve regurgitation is present.  · Estimated right ventricular systolic pressure from tricuspid regurgitation is mildly elevated (35-45 mmHg).  · Mild pulmonary hypertension is present.    Discharge Details        Discharge Medications      New Medications      Instructions Start Date   dexamethasone 6 MG tablet  Commonly known as: DECADRON   6 mg, Oral, Daily, Start on Saturday 10/15/22   Start Date: October 15, 2022        Changes to Medications      Instructions Start Date   furosemide 40 MG tablet  Commonly known as: LASIX  What changed: how much to take   20 mg, Oral, Daily      potassium chloride 10 MEQ CR capsule  Commonly known as: MICRO-K  What changed:   · how much to take  · how to take this  · when to take this  · additional instructions   Take 1 capsule by mouth daily when you take  Furosemide (Lasix)      traZODone 50 MG tablet  Commonly known as: DESYREL  What changed:   · how much to take  · when to take this  · reasons to take this    mg, Oral, Nightly PRN         Continue These Medications      Instructions Start Date   albuterol (2.5 MG/3ML) 0.083% nebulizer solution  Commonly known as: PROVENTIL   2.5 mg, Nebulization, Every 4 Hours PRN      Anoro Ellipta 62.5-25 MCG/INH aerosol powder  inhaler  Generic drug: umeclidinium-vilanterol   1 puff, Inhalation, Daily - RT      fenofibrate 160 MG tablet   160 mg, Oral, Daily      metFORMIN 500 MG tablet  Commonly known as: GLUCOPHAGE   500 mg, Oral, Daily With Breakfast         Stop These Medications    lisinopril 20 MG tablet  Commonly known as: PRINIVIL,ZESTRIL          No Known Allergies    Discharge Disposition:  Home or Self Care    Diet:  Hospital:  Diet Order   Procedures   • Diet Regular; Consistent Carbohydrate     Activity: As tolerated      CODE STATUS:    Code Status and Medical Interventions:   Ordered at: 10/10/22 1840     Level Of Support Discussed With:    Patient     Code Status (Patient has no pulse and is not breathing):    CPR (Attempt to Resuscitate)     Medical Interventions (Patient has pulse or is breathing):    Full Support     Future Appointments   Date Time Provider Department Center   11/28/2022 11:00 AM [facility] LAB [facility]   11/28/2022 11:30 AM Veronica Rothman APRN [facility]     Additional Instructions for the Follow-ups that You Need to Schedule     Discharge Follow-up with PCP   As directed       Currently Documented PCP:    Lulu Love APRN    PCP Phone Number:    [phone]     Follow Up Details: Telemedicine in 1 week (if office does telemed) OR in person after 10/30/22             Mirela Jones PA-C  10/14/22    Time Spent on Discharge:  I spent 40 minutes on this discharge activity which included: face-to-face encounter with the patient, reviewing the  data in the system, coordination of the care with the nursing staff as well as consultants, documentation, and entering orders.            Electronically signed by Otilio Owens MD at 10/14/22 5383

## 2022-10-15 NOTE — OUTREACH NOTE
COVID-19 Week 1 Survey    Flowsheet Row Responses   [facility] patient discharged from? [place]   Does the patient have one of the following disease processes/diagnoses(primary or secondary)? COVID-19   COVID-19 underlying condition? None   Call Number Call 1   Week 1 Call successful? Yes   Call start time 0942   Call end time 0944   Discharge diagnosis Acute on chronic respiratory failure with hypoxia and hypercapnia,  COVID-19 virus detected      Is patient permission given to speak with other caregiver? Yes   List who call center can speak with Dtr   Person spoke with today (if not patient) and relationship Dtr   Meds reviewed with patient/caregiver? Yes   Is the patient having any side effects they believe may be caused by any medication additions or changes? No   Does the patient have all medications ordered at discharge? Yes   Is the patient taking all medications as directed (includes completed medication regime)? Yes   Does the patient have a primary care provider?  Yes   Does the patient have an appointment with their PCP or specialist within 7 days of discharge? No   What is preventing the patient from scheduling follow up appointments within 7 days of discharge? Haven't had time   Nursing Interventions Educated patient on importance of making appointment, Advised patient to make appointment   Has the patient kept scheduled appointments due by today? N/A   Has home health visited the patient within 72 hours of discharge? N/A   Has all DME been delivered? No   DME comments Home O2 prior to this admit   Psychosocial issues? No   Did the patient receive a copy of their discharge instructions? Yes   Did the patient receive a copy of COVID-19 specific instructions? Yes   Nursing interventions Reviewed instructions with patient   What is the patient's perception of their health status since discharge? Improving   Does the patient have any of the following symptoms? Cough, Shortness of breath   Nursing  Interventions Nurse provided patient education   Pulse Ox monitoring Intermittent   Pulse Ox device source Patient   O2 Sat comments Dtr not with patient, unsure of reading   O2 Sat: education provided Sat levels, Monitoring frequency, When to seek care   Is the patient/caregiver able to teach back the hierarchy of who to call/visit for symptoms/problems? PCP, Specialist, Home health nurse, Urgent Care, ED, 911 Yes   COVID-19 call completed? Yes   Wrap up additional comments Very brief call, bad reception.          BHUPINDER CANDELARIA - Registered Nurse

## 2022-10-15 NOTE — OUTREACH NOTE
Prep Survey    Flowsheet Row Responses   [facility] facility patient discharged from? [place]   Is LACE score < 7 ? No   Emergency Room discharge w/ pulse ox? No   Eligibility Readm Mgmt   Discharge diagnosis Acute on chronic respiratory failure with hypoxia and hypercapnia,  COVID-19 virus detected      Does the patient have one of the following disease processes/diagnoses(primary or secondary)? COVID-19   Does the patient have Home health ordered? No   Is there a DME ordered? Yes   What DME was ordered? neb machine [facility]   Prep survey completed? Yes          DIOGO CASAS - Registered Nurse

## 2022-10-17 ENCOUNTER — READMISSION MANAGEMENT (OUTPATIENT)
Dept: CALL CENTER | Facility: HOSPITAL | Age: 57
End: 2022-10-17

## 2022-10-17 NOTE — OUTREACH NOTE
COVID-19 Week 1 Survey    Flowsheet Row Responses   [facility] facility patient discharged from? [place]   Does the patient have one of the following disease processes/diagnoses(primary or secondary)? COVID-19   COVID-19 underlying condition? None   Call Number Call 2   Week 1 Call successful? No   Discharge diagnosis Acute on chronic respiratory failure with hypoxia and hypercapnia,  COVID-19 virus detected             CHIKA PORRAS - Registered Nurse

## 2022-10-24 ENCOUNTER — READMISSION MANAGEMENT (OUTPATIENT)
Dept: CALL CENTER | Facility: HOSPITAL | Age: 57
End: 2022-10-24

## 2022-10-24 NOTE — OUTREACH NOTE
COVID-19 Week 2 Survey    Flowsheet Row Responses   [facility] facility patient discharged from? [place]   Does the patient have one of the following disease processes/diagnoses(primary or secondary)? COVID-19   COVID-19 underlying condition? COPD   Call Number Call 1   COVID-19 Week 2: Call 1 attempt successful? No   Discharge diagnosis Acute on chronic respiratory failure with hypoxia and hypercapnia,  COVID-19 virus detected             ANDREA Jasmine Registered Nurse

## 2022-10-28 ENCOUNTER — APPOINTMENT (OUTPATIENT)
Dept: CT IMAGING | Facility: HOSPITAL | Age: 57
End: 2022-10-28

## 2022-10-28 ENCOUNTER — HOSPITAL ENCOUNTER (INPATIENT)
Facility: HOSPITAL | Age: 57
LOS: 3 days | Discharge: HOME | End: 2022-10-31
Attending: EMERGENCY MEDICINE | Admitting: INTERNAL MEDICINE

## 2022-10-28 ENCOUNTER — APPOINTMENT (OUTPATIENT)
Dept: CARDIOLOGY | Facility: HOSPITAL | Age: 57
End: 2022-10-28

## 2022-10-28 ENCOUNTER — APPOINTMENT (OUTPATIENT)
Dept: GENERAL RADIOLOGY | Facility: HOSPITAL | Age: 57
End: 2022-10-28

## 2022-10-28 DIAGNOSIS — R06.02 SHORTNESS OF BREATH: ICD-10-CM

## 2022-10-28 DIAGNOSIS — J96.01 ACUTE RESPIRATORY FAILURE WITH HYPOXIA: Primary | ICD-10-CM

## 2022-10-28 DIAGNOSIS — R60.0 LOWER EXTREMITY EDEMA: ICD-10-CM

## 2022-10-28 PROBLEM — D72.829 LEUKOCYTOSIS: Status: ACTIVE | Noted: 2022-10-28

## 2022-10-28 LAB
ALBUMIN SERPL-MCNC: 4 G/DL (ref 3.5–5.2)
ALBUMIN/GLOB SERPL: 1.1 G/DL
ALP SERPL-CCNC: 126 U/L (ref 39–117)
ALT SERPL W P-5'-P-CCNC: 23 U/L (ref 1–33)
ANION GAP SERPL CALCULATED.3IONS-SCNC: 8 MMOL/L (ref 5–15)
AST SERPL-CCNC: 17 U/L (ref 1–32)
BASOPHILS # BLD AUTO: 0.05 10*3/MM3 (ref 0–0.2)
BASOPHILS NFR BLD AUTO: 0.4 % (ref 0–1.5)
BH CV LOWER VASCULAR LEFT COMMON FEMORAL AUGMENT: NORMAL
BH CV LOWER VASCULAR LEFT COMMON FEMORAL COMPETENT: NORMAL
BH CV LOWER VASCULAR LEFT COMMON FEMORAL COMPRESS: NORMAL
BH CV LOWER VASCULAR LEFT COMMON FEMORAL PHASIC: NORMAL
BH CV LOWER VASCULAR LEFT COMMON FEMORAL SPONT: NORMAL
BH CV LOWER VASCULAR LEFT DISTAL FEMORAL COMPRESS: NORMAL
BH CV LOWER VASCULAR LEFT GASTRONEMIUS COMPRESS: NORMAL
BH CV LOWER VASCULAR LEFT GREATER SAPH AK COMPRESS: NORMAL
BH CV LOWER VASCULAR LEFT GREATER SAPH BK COMPRESS: NORMAL
BH CV LOWER VASCULAR LEFT LESSER SAPH COMPRESS: NORMAL
BH CV LOWER VASCULAR LEFT MID FEMORAL AUGMENT: NORMAL
BH CV LOWER VASCULAR LEFT MID FEMORAL COMPETENT: NORMAL
BH CV LOWER VASCULAR LEFT MID FEMORAL COMPRESS: NORMAL
BH CV LOWER VASCULAR LEFT MID FEMORAL PHASIC: NORMAL
BH CV LOWER VASCULAR LEFT MID FEMORAL SPONT: NORMAL
BH CV LOWER VASCULAR LEFT PERONEAL COMPRESS: NORMAL
BH CV LOWER VASCULAR LEFT POPLITEAL AUGMENT: NORMAL
BH CV LOWER VASCULAR LEFT POPLITEAL COMPETENT: NORMAL
BH CV LOWER VASCULAR LEFT POPLITEAL COMPRESS: NORMAL
BH CV LOWER VASCULAR LEFT POPLITEAL PHASIC: NORMAL
BH CV LOWER VASCULAR LEFT POPLITEAL SPONT: NORMAL
BH CV LOWER VASCULAR LEFT POSTERIOR TIBIAL COMPRESS: NORMAL
BH CV LOWER VASCULAR LEFT PROFUNDA FEMORAL COMPRESS: NORMAL
BH CV LOWER VASCULAR LEFT PROXIMAL FEMORAL COMPRESS: NORMAL
BH CV LOWER VASCULAR LEFT SAPHENOFEMORAL JUNCTION COMPRESS: NORMAL
BH CV LOWER VASCULAR RIGHT COMMON FEMORAL AUGMENT: NORMAL
BH CV LOWER VASCULAR RIGHT COMMON FEMORAL COMPETENT: NORMAL
BH CV LOWER VASCULAR RIGHT COMMON FEMORAL COMPRESS: NORMAL
BH CV LOWER VASCULAR RIGHT COMMON FEMORAL PHASIC: NORMAL
BH CV LOWER VASCULAR RIGHT COMMON FEMORAL SPONT: NORMAL
BH CV LOWER VASCULAR RIGHT DISTAL FEMORAL COMPRESS: NORMAL
BH CV LOWER VASCULAR RIGHT GASTRONEMIUS COMPRESS: NORMAL
BH CV LOWER VASCULAR RIGHT GREATER SAPH AK COMPRESS: NORMAL
BH CV LOWER VASCULAR RIGHT GREATER SAPH BK COMPRESS: NORMAL
BH CV LOWER VASCULAR RIGHT LESSER SAPH COMPRESS: NORMAL
BH CV LOWER VASCULAR RIGHT MID FEMORAL AUGMENT: NORMAL
BH CV LOWER VASCULAR RIGHT MID FEMORAL COMPETENT: NORMAL
BH CV LOWER VASCULAR RIGHT MID FEMORAL COMPRESS: NORMAL
BH CV LOWER VASCULAR RIGHT MID FEMORAL PHASIC: NORMAL
BH CV LOWER VASCULAR RIGHT MID FEMORAL SPONT: NORMAL
BH CV LOWER VASCULAR RIGHT PERONEAL COMPRESS: NORMAL
BH CV LOWER VASCULAR RIGHT POPLITEAL AUGMENT: NORMAL
BH CV LOWER VASCULAR RIGHT POPLITEAL COMPETENT: NORMAL
BH CV LOWER VASCULAR RIGHT POPLITEAL COMPRESS: NORMAL
BH CV LOWER VASCULAR RIGHT POPLITEAL PHASIC: NORMAL
BH CV LOWER VASCULAR RIGHT POPLITEAL SPONT: NORMAL
BH CV LOWER VASCULAR RIGHT POSTERIOR TIBIAL COMPRESS: NORMAL
BH CV LOWER VASCULAR RIGHT PROFUNDA FEMORAL COMPRESS: NORMAL
BH CV LOWER VASCULAR RIGHT PROXIMAL FEMORAL COMPRESS: NORMAL
BH CV LOWER VASCULAR RIGHT SAPHENOFEMORAL JUNCTION COMPRESS: NORMAL
BH CV VAS PRELIMINARY FINDINGS SCRIPTING: 1
BILIRUB SERPL-MCNC: 0.2 MG/DL (ref 0–1.2)
BUN SERPL-MCNC: 11 MG/DL (ref 6–20)
BUN/CREAT SERPL: 18 (ref 7–25)
CALCIUM SPEC-SCNC: 9.7 MG/DL (ref 8.6–10.5)
CHLORIDE SERPL-SCNC: 95 MMOL/L (ref 98–107)
CO2 SERPL-SCNC: 35 MMOL/L (ref 22–29)
CREAT SERPL-MCNC: 0.61 MG/DL (ref 0.57–1)
D-LACTATE SERPL-SCNC: 0.7 MMOL/L (ref 0.5–2)
DEPRECATED RDW RBC AUTO: 47 FL (ref 37–54)
EGFRCR SERPLBLD CKD-EPI 2021: 103.8 ML/MIN/1.73
EOSINOPHIL # BLD AUTO: 0.15 10*3/MM3 (ref 0–0.4)
EOSINOPHIL NFR BLD AUTO: 1.2 % (ref 0.3–6.2)
ERYTHROCYTE [DISTWIDTH] IN BLOOD BY AUTOMATED COUNT: 14.7 % (ref 12.3–15.4)
GLOBULIN UR ELPH-MCNC: 3.6 GM/DL
GLUCOSE SERPL-MCNC: 173 MG/DL (ref 65–99)
HCT VFR BLD AUTO: 45.6 % (ref 34–46.6)
HGB BLD-MCNC: 13.8 G/DL (ref 12–15.9)
IMM GRANULOCYTES # BLD AUTO: 0.06 10*3/MM3 (ref 0–0.05)
IMM GRANULOCYTES NFR BLD AUTO: 0.5 % (ref 0–0.5)
LYMPHOCYTES # BLD AUTO: 1.17 10*3/MM3 (ref 0.7–3.1)
LYMPHOCYTES NFR BLD AUTO: 9.4 % (ref 19.6–45.3)
MAXIMAL PREDICTED HEART RATE: 162 BPM
MCH RBC QN AUTO: 26.8 PG (ref 26.6–33)
MCHC RBC AUTO-ENTMCNC: 30.3 G/DL (ref 31.5–35.7)
MCV RBC AUTO: 88.5 FL (ref 79–97)
MONOCYTES # BLD AUTO: 0.69 10*3/MM3 (ref 0.1–0.9)
MONOCYTES NFR BLD AUTO: 5.6 % (ref 5–12)
NEUTROPHILS NFR BLD AUTO: 10.31 10*3/MM3 (ref 1.7–7)
NEUTROPHILS NFR BLD AUTO: 82.9 % (ref 42.7–76)
NRBC BLD AUTO-RTO: 0 /100 WBC (ref 0–0.2)
NT-PROBNP SERPL-MCNC: 39.7 PG/ML (ref 0–900)
PLATELET # BLD AUTO: 312 10*3/MM3 (ref 140–450)
PMV BLD AUTO: 9.8 FL (ref 6–12)
POTASSIUM SERPL-SCNC: 3.9 MMOL/L (ref 3.5–5.2)
PROCALCITONIN SERPL-MCNC: 0.04 NG/ML (ref 0–0.25)
PROT SERPL-MCNC: 7.6 G/DL (ref 6–8.5)
RBC # BLD AUTO: 5.15 10*6/MM3 (ref 3.77–5.28)
SODIUM SERPL-SCNC: 138 MMOL/L (ref 136–145)
STRESS TARGET HR: 138 BPM
TROPONIN T SERPL-MCNC: <0.01 NG/ML (ref 0–0.03)
WBC NRBC COR # BLD: 12.43 10*3/MM3 (ref 3.4–10.8)

## 2022-10-28 PROCEDURE — 25010000002 METHYLPREDNISOLONE PER 125 MG: Performed by: NURSE PRACTITIONER

## 2022-10-28 PROCEDURE — 84484 ASSAY OF TROPONIN QUANT: CPT | Performed by: NURSE PRACTITIONER

## 2022-10-28 PROCEDURE — 25010000002 CEFTRIAXONE PER 250 MG: Performed by: PHYSICIAN ASSISTANT

## 2022-10-28 PROCEDURE — 36415 COLL VENOUS BLD VENIPUNCTURE: CPT

## 2022-10-28 PROCEDURE — 71275 CT ANGIOGRAPHY CHEST: CPT

## 2022-10-28 PROCEDURE — 25010000002 HEPARIN (PORCINE) PER 1000 UNITS: Performed by: PHYSICIAN ASSISTANT

## 2022-10-28 PROCEDURE — 93970 EXTREMITY STUDY: CPT | Performed by: INTERNAL MEDICINE

## 2022-10-28 PROCEDURE — 25010000002 FUROSEMIDE PER 20 MG: Performed by: INTERNAL MEDICINE

## 2022-10-28 PROCEDURE — 71045 X-RAY EXAM CHEST 1 VIEW: CPT

## 2022-10-28 PROCEDURE — 83880 ASSAY OF NATRIURETIC PEPTIDE: CPT | Performed by: NURSE PRACTITIONER

## 2022-10-28 PROCEDURE — 94640 AIRWAY INHALATION TREATMENT: CPT

## 2022-10-28 PROCEDURE — 94799 UNLISTED PULMONARY SVC/PX: CPT

## 2022-10-28 PROCEDURE — 93970 EXTREMITY STUDY: CPT

## 2022-10-28 PROCEDURE — 0 IOPAMIDOL PER 1 ML: Performed by: EMERGENCY MEDICINE

## 2022-10-28 PROCEDURE — 85025 COMPLETE CBC W/AUTO DIFF WBC: CPT | Performed by: NURSE PRACTITIONER

## 2022-10-28 PROCEDURE — 99223 1ST HOSP IP/OBS HIGH 75: CPT | Performed by: INTERNAL MEDICINE

## 2022-10-28 PROCEDURE — 80053 COMPREHEN METABOLIC PANEL: CPT | Performed by: NURSE PRACTITIONER

## 2022-10-28 PROCEDURE — 99284 EMERGENCY DEPT VISIT MOD MDM: CPT

## 2022-10-28 PROCEDURE — 87040 BLOOD CULTURE FOR BACTERIA: CPT | Performed by: NURSE PRACTITIONER

## 2022-10-28 PROCEDURE — 83605 ASSAY OF LACTIC ACID: CPT | Performed by: NURSE PRACTITIONER

## 2022-10-28 PROCEDURE — 84145 PROCALCITONIN (PCT): CPT | Performed by: NURSE PRACTITIONER

## 2022-10-28 RX ORDER — HEPARIN SODIUM 5000 [USP'U]/ML
5000 INJECTION, SOLUTION INTRAVENOUS; SUBCUTANEOUS EVERY 8 HOURS SCHEDULED
Status: DISCONTINUED | OUTPATIENT
Start: 2022-10-28 | End: 2022-10-31 | Stop reason: HOSPADM

## 2022-10-28 RX ORDER — METHYLPREDNISOLONE SODIUM SUCCINATE 125 MG/2ML
40 INJECTION, POWDER, LYOPHILIZED, FOR SOLUTION INTRAMUSCULAR; INTRAVENOUS 2 TIMES DAILY
Status: DISCONTINUED | OUTPATIENT
Start: 2022-10-29 | End: 2022-10-28

## 2022-10-28 RX ORDER — ONDANSETRON 2 MG/ML
4 INJECTION INTRAMUSCULAR; INTRAVENOUS EVERY 6 HOURS PRN
Status: DISCONTINUED | OUTPATIENT
Start: 2022-10-28 | End: 2022-10-31 | Stop reason: HOSPADM

## 2022-10-28 RX ORDER — DEXTROSE MONOHYDRATE 25 G/50ML
25 INJECTION, SOLUTION INTRAVENOUS
Status: DISCONTINUED | OUTPATIENT
Start: 2022-10-28 | End: 2022-10-31 | Stop reason: HOSPADM

## 2022-10-28 RX ORDER — FUROSEMIDE 20 MG/1
20 TABLET ORAL DAILY
Status: DISCONTINUED | OUTPATIENT
Start: 2022-10-29 | End: 2022-10-29

## 2022-10-28 RX ORDER — ACETAMINOPHEN 325 MG/1
650 TABLET ORAL EVERY 4 HOURS PRN
Status: DISCONTINUED | OUTPATIENT
Start: 2022-10-28 | End: 2022-10-31 | Stop reason: HOSPADM

## 2022-10-28 RX ORDER — FENOFIBRATE 145 MG/1
145 TABLET, COATED ORAL DAILY
Status: DISCONTINUED | OUTPATIENT
Start: 2022-10-29 | End: 2022-10-31 | Stop reason: HOSPADM

## 2022-10-28 RX ORDER — CHOLECALCIFEROL (VITAMIN D3) 125 MCG
5 CAPSULE ORAL NIGHTLY PRN
Status: DISCONTINUED | OUTPATIENT
Start: 2022-10-28 | End: 2022-10-31 | Stop reason: HOSPADM

## 2022-10-28 RX ORDER — METHYLPREDNISOLONE SODIUM SUCCINATE 125 MG/2ML
125 INJECTION, POWDER, LYOPHILIZED, FOR SOLUTION INTRAMUSCULAR; INTRAVENOUS ONCE
Status: COMPLETED | OUTPATIENT
Start: 2022-10-28 | End: 2022-10-28

## 2022-10-28 RX ORDER — SODIUM CHLORIDE 0.9 % (FLUSH) 0.9 %
10 SYRINGE (ML) INJECTION AS NEEDED
Status: DISCONTINUED | OUTPATIENT
Start: 2022-10-28 | End: 2022-10-31 | Stop reason: HOSPADM

## 2022-10-28 RX ORDER — PREDNISONE 20 MG/1
40 TABLET ORAL DAILY
Status: DISCONTINUED | OUTPATIENT
Start: 2022-10-29 | End: 2022-10-31 | Stop reason: HOSPADM

## 2022-10-28 RX ORDER — IPRATROPIUM BROMIDE AND ALBUTEROL SULFATE 2.5; .5 MG/3ML; MG/3ML
3 SOLUTION RESPIRATORY (INHALATION)
Status: DISCONTINUED | OUTPATIENT
Start: 2022-10-28 | End: 2022-10-31 | Stop reason: HOSPADM

## 2022-10-28 RX ORDER — ONDANSETRON 4 MG/1
4 TABLET, FILM COATED ORAL EVERY 6 HOURS PRN
Status: DISCONTINUED | OUTPATIENT
Start: 2022-10-28 | End: 2022-10-31 | Stop reason: HOSPADM

## 2022-10-28 RX ORDER — NICOTINE POLACRILEX 4 MG
15 LOZENGE BUCCAL
Status: DISCONTINUED | OUTPATIENT
Start: 2022-10-28 | End: 2022-10-31 | Stop reason: HOSPADM

## 2022-10-28 RX ORDER — BUDESONIDE AND FORMOTEROL FUMARATE DIHYDRATE 80; 4.5 UG/1; UG/1
2 AEROSOL RESPIRATORY (INHALATION)
Status: DISCONTINUED | OUTPATIENT
Start: 2022-10-28 | End: 2022-10-31 | Stop reason: HOSPADM

## 2022-10-28 RX ORDER — SODIUM CHLORIDE 0.9 % (FLUSH) 0.9 %
10 SYRINGE (ML) INJECTION EVERY 12 HOURS SCHEDULED
Status: DISCONTINUED | OUTPATIENT
Start: 2022-10-28 | End: 2022-10-31 | Stop reason: HOSPADM

## 2022-10-28 RX ORDER — INSULIN LISPRO 100 [IU]/ML
0-9 INJECTION, SOLUTION INTRAVENOUS; SUBCUTANEOUS
Status: DISCONTINUED | OUTPATIENT
Start: 2022-10-29 | End: 2022-10-31 | Stop reason: HOSPADM

## 2022-10-28 RX ORDER — TRAZODONE HYDROCHLORIDE 50 MG/1
50 TABLET ORAL NIGHTLY PRN
Status: DISCONTINUED | OUTPATIENT
Start: 2022-10-28 | End: 2022-10-31 | Stop reason: HOSPADM

## 2022-10-28 RX ORDER — IPRATROPIUM BROMIDE AND ALBUTEROL SULFATE 2.5; .5 MG/3ML; MG/3ML
3 SOLUTION RESPIRATORY (INHALATION) ONCE
Status: COMPLETED | OUTPATIENT
Start: 2022-10-28 | End: 2022-10-28

## 2022-10-28 RX ORDER — FUROSEMIDE 10 MG/ML
20 INJECTION INTRAMUSCULAR; INTRAVENOUS ONCE
Status: COMPLETED | OUTPATIENT
Start: 2022-10-28 | End: 2022-10-28

## 2022-10-28 RX ADMIN — IOPAMIDOL 100 ML: 755 INJECTION, SOLUTION INTRAVENOUS at 17:38

## 2022-10-28 RX ADMIN — METHYLPREDNISOLONE SODIUM SUCCINATE 125 MG: 125 INJECTION, POWDER, FOR SOLUTION INTRAMUSCULAR; INTRAVENOUS at 18:56

## 2022-10-28 RX ADMIN — FUROSEMIDE 20 MG: 10 INJECTION INTRAMUSCULAR; INTRAVENOUS at 22:52

## 2022-10-28 RX ADMIN — HEPARIN SODIUM 5000 UNITS: 5000 INJECTION INTRAVENOUS; SUBCUTANEOUS at 21:52

## 2022-10-28 RX ADMIN — ACETAMINOPHEN 650 MG: 325 TABLET, FILM COATED ORAL at 21:12

## 2022-10-28 RX ADMIN — Medication 10 ML: at 20:39

## 2022-10-28 RX ADMIN — IPRATROPIUM BROMIDE AND ALBUTEROL SULFATE 3 ML: .5; 3 SOLUTION RESPIRATORY (INHALATION) at 23:34

## 2022-10-28 RX ADMIN — IPRATROPIUM BROMIDE AND ALBUTEROL SULFATE 3 ML: .5; 3 SOLUTION RESPIRATORY (INHALATION) at 18:38

## 2022-10-28 RX ADMIN — Medication 5 MG: at 21:12

## 2022-10-28 RX ADMIN — SODIUM CHLORIDE 1 G: 900 INJECTION INTRAVENOUS at 21:53

## 2022-10-28 RX ADMIN — DOXYCYCLINE 100 MG: 100 INJECTION, POWDER, LYOPHILIZED, FOR SOLUTION INTRAVENOUS at 20:37

## 2022-10-29 ENCOUNTER — READMISSION MANAGEMENT (OUTPATIENT)
Dept: CALL CENTER | Facility: HOSPITAL | Age: 57
End: 2022-10-29

## 2022-10-29 LAB
ANION GAP SERPL CALCULATED.3IONS-SCNC: 12 MMOL/L (ref 5–15)
BASOPHILS # BLD AUTO: 0.03 10*3/MM3 (ref 0–0.2)
BASOPHILS NFR BLD AUTO: 0.3 % (ref 0–1.5)
BUN SERPL-MCNC: 15 MG/DL (ref 6–20)
BUN/CREAT SERPL: 30 (ref 7–25)
CALCIUM SPEC-SCNC: 9.4 MG/DL (ref 8.6–10.5)
CHLORIDE SERPL-SCNC: 97 MMOL/L (ref 98–107)
CO2 SERPL-SCNC: 30 MMOL/L (ref 22–29)
CREAT SERPL-MCNC: 0.5 MG/DL (ref 0.57–1)
DEPRECATED RDW RBC AUTO: 46.3 FL (ref 37–54)
EGFRCR SERPLBLD CKD-EPI 2021: 108.9 ML/MIN/1.73
EOSINOPHIL # BLD AUTO: 0 10*3/MM3 (ref 0–0.4)
EOSINOPHIL NFR BLD AUTO: 0 % (ref 0.3–6.2)
ERYTHROCYTE [DISTWIDTH] IN BLOOD BY AUTOMATED COUNT: 14.6 % (ref 12.3–15.4)
GLUCOSE BLDC GLUCOMTR-MCNC: 129 MG/DL (ref 70–130)
GLUCOSE BLDC GLUCOMTR-MCNC: 183 MG/DL (ref 70–130)
GLUCOSE BLDC GLUCOMTR-MCNC: 250 MG/DL (ref 70–130)
GLUCOSE SERPL-MCNC: 172 MG/DL (ref 65–99)
HBA1C MFR BLD: 7.4 % (ref 4.8–5.6)
HCT VFR BLD AUTO: 44.5 % (ref 34–46.6)
HGB BLD-MCNC: 13.7 G/DL (ref 12–15.9)
IMM GRANULOCYTES # BLD AUTO: 0.08 10*3/MM3 (ref 0–0.05)
IMM GRANULOCYTES NFR BLD AUTO: 0.7 % (ref 0–0.5)
LYMPHOCYTES # BLD AUTO: 1.21 10*3/MM3 (ref 0.7–3.1)
LYMPHOCYTES NFR BLD AUTO: 10.9 % (ref 19.6–45.3)
MCH RBC QN AUTO: 26.8 PG (ref 26.6–33)
MCHC RBC AUTO-ENTMCNC: 30.8 G/DL (ref 31.5–35.7)
MCV RBC AUTO: 86.9 FL (ref 79–97)
MONOCYTES # BLD AUTO: 0.51 10*3/MM3 (ref 0.1–0.9)
MONOCYTES NFR BLD AUTO: 4.6 % (ref 5–12)
NEUTROPHILS NFR BLD AUTO: 83.5 % (ref 42.7–76)
NEUTROPHILS NFR BLD AUTO: 9.32 10*3/MM3 (ref 1.7–7)
NRBC BLD AUTO-RTO: 0 /100 WBC (ref 0–0.2)
PLATELET # BLD AUTO: 287 10*3/MM3 (ref 140–450)
PMV BLD AUTO: 9.9 FL (ref 6–12)
POTASSIUM SERPL-SCNC: 4.2 MMOL/L (ref 3.5–5.2)
RBC # BLD AUTO: 5.12 10*6/MM3 (ref 3.77–5.28)
SODIUM SERPL-SCNC: 139 MMOL/L (ref 136–145)
WBC NRBC COR # BLD: 11.15 10*3/MM3 (ref 3.4–10.8)

## 2022-10-29 PROCEDURE — 25010000002 HEPARIN (PORCINE) PER 1000 UNITS: Performed by: PHYSICIAN ASSISTANT

## 2022-10-29 PROCEDURE — 94799 UNLISTED PULMONARY SVC/PX: CPT

## 2022-10-29 PROCEDURE — 83036 HEMOGLOBIN GLYCOSYLATED A1C: CPT | Performed by: PHYSICIAN ASSISTANT

## 2022-10-29 PROCEDURE — 29580 STRAPPING UNNA BOOT: CPT

## 2022-10-29 PROCEDURE — 25010000002 CEFTRIAXONE PER 250 MG: Performed by: PHYSICIAN ASSISTANT

## 2022-10-29 PROCEDURE — 94761 N-INVAS EAR/PLS OXIMETRY MLT: CPT

## 2022-10-29 PROCEDURE — 80048 BASIC METABOLIC PNL TOTAL CA: CPT | Performed by: PHYSICIAN ASSISTANT

## 2022-10-29 PROCEDURE — 63710000001 PREDNISONE PER 1 MG: Performed by: INTERNAL MEDICINE

## 2022-10-29 PROCEDURE — 25010000002 FUROSEMIDE PER 20 MG: Performed by: INTERNAL MEDICINE

## 2022-10-29 PROCEDURE — 99233 SBSQ HOSP IP/OBS HIGH 50: CPT | Performed by: INTERNAL MEDICINE

## 2022-10-29 PROCEDURE — 82962 GLUCOSE BLOOD TEST: CPT

## 2022-10-29 PROCEDURE — 85025 COMPLETE CBC W/AUTO DIFF WBC: CPT | Performed by: PHYSICIAN ASSISTANT

## 2022-10-29 PROCEDURE — 63710000001 INSULIN LISPRO (HUMAN) PER 5 UNITS: Performed by: PHYSICIAN ASSISTANT

## 2022-10-29 PROCEDURE — 97162 PT EVAL MOD COMPLEX 30 MIN: CPT

## 2022-10-29 RX ORDER — FUROSEMIDE 10 MG/ML
40 INJECTION INTRAMUSCULAR; INTRAVENOUS ONCE
Status: COMPLETED | OUTPATIENT
Start: 2022-10-29 | End: 2022-10-29

## 2022-10-29 RX ADMIN — Medication 5 MG: at 20:14

## 2022-10-29 RX ADMIN — HEPARIN SODIUM 5000 UNITS: 5000 INJECTION INTRAVENOUS; SUBCUTANEOUS at 21:38

## 2022-10-29 RX ADMIN — INSULIN LISPRO 6 UNITS: 100 INJECTION, SOLUTION INTRAVENOUS; SUBCUTANEOUS at 18:05

## 2022-10-29 RX ADMIN — FUROSEMIDE 40 MG: 10 INJECTION, SOLUTION INTRAMUSCULAR; INTRAVENOUS at 13:20

## 2022-10-29 RX ADMIN — PREDNISONE 40 MG: 20 TABLET ORAL at 10:45

## 2022-10-29 RX ADMIN — DOXYCYCLINE 100 MG: 100 INJECTION, POWDER, LYOPHILIZED, FOR SOLUTION INTRAVENOUS at 20:13

## 2022-10-29 RX ADMIN — IPRATROPIUM BROMIDE AND ALBUTEROL SULFATE 3 ML: .5; 3 SOLUTION RESPIRATORY (INHALATION) at 11:33

## 2022-10-29 RX ADMIN — IPRATROPIUM BROMIDE AND ALBUTEROL SULFATE 3 ML: .5; 3 SOLUTION RESPIRATORY (INHALATION) at 23:00

## 2022-10-29 RX ADMIN — HEPARIN SODIUM 5000 UNITS: 5000 INJECTION INTRAVENOUS; SUBCUTANEOUS at 13:23

## 2022-10-29 RX ADMIN — FENOFIBRATE 145 MG: 145 TABLET ORAL at 10:45

## 2022-10-29 RX ADMIN — IPRATROPIUM BROMIDE AND ALBUTEROL SULFATE 3 ML: .5; 3 SOLUTION RESPIRATORY (INHALATION) at 19:34

## 2022-10-29 RX ADMIN — SODIUM CHLORIDE 1 G: 900 INJECTION INTRAVENOUS at 20:13

## 2022-10-29 RX ADMIN — BUDESONIDE AND FORMOTEROL FUMARATE DIHYDRATE 2 PUFF: 80; 4.5 AEROSOL RESPIRATORY (INHALATION) at 19:35

## 2022-10-29 RX ADMIN — ACETAMINOPHEN 650 MG: 325 TABLET, FILM COATED ORAL at 18:05

## 2022-10-29 RX ADMIN — IPRATROPIUM BROMIDE AND ALBUTEROL SULFATE 3 ML: .5; 3 SOLUTION RESPIRATORY (INHALATION) at 15:46

## 2022-10-29 RX ADMIN — HEPARIN SODIUM 5000 UNITS: 5000 INJECTION INTRAVENOUS; SUBCUTANEOUS at 05:24

## 2022-10-29 RX ADMIN — TRAZODONE HYDROCHLORIDE 50 MG: 50 TABLET ORAL at 22:33

## 2022-10-29 RX ADMIN — BUDESONIDE AND FORMOTEROL FUMARATE DIHYDRATE 2 PUFF: 80; 4.5 AEROSOL RESPIRATORY (INHALATION) at 08:12

## 2022-10-29 RX ADMIN — ACETAMINOPHEN 650 MG: 325 TABLET, FILM COATED ORAL at 02:40

## 2022-10-29 RX ADMIN — INSULIN LISPRO 2 UNITS: 100 INJECTION, SOLUTION INTRAVENOUS; SUBCUTANEOUS at 10:43

## 2022-10-29 RX ADMIN — Medication 10 ML: at 20:13

## 2022-10-29 RX ADMIN — ACETAMINOPHEN 650 MG: 325 TABLET, FILM COATED ORAL at 10:43

## 2022-10-29 RX ADMIN — FUROSEMIDE 20 MG: 20 TABLET ORAL at 10:45

## 2022-10-29 RX ADMIN — IPRATROPIUM BROMIDE AND ALBUTEROL SULFATE 3 ML: .5; 3 SOLUTION RESPIRATORY (INHALATION) at 08:12

## 2022-10-29 RX ADMIN — DOXYCYCLINE 100 MG: 100 INJECTION, POWDER, LYOPHILIZED, FOR SOLUTION INTRAVENOUS at 10:44

## 2022-10-29 NOTE — OUTREACH NOTE
COVID-19 Week 3 Survey    Flowsheet Row Responses   [facility] facility patient discharged from? [place]   Does the patient have one of the following disease processes/diagnoses(primary or secondary)? COVID-19   COVID-19 underlying condition? COPD   Call Number Call 1   COVID-19 Week 3: Call 1 attempt successful? No   Revoke Readmitted   Discharge diagnosis Acute on chronic respiratory failure with hypoxia and hypercapnia,  COVID-19 virus detected             SAAD CANDELARIA - Registered Nurse

## 2022-10-30 LAB
ANION GAP SERPL CALCULATED.3IONS-SCNC: 9 MMOL/L (ref 5–15)
BASOPHILS # BLD AUTO: 0.03 10*3/MM3 (ref 0–0.2)
BASOPHILS NFR BLD AUTO: 0.3 % (ref 0–1.5)
BUN SERPL-MCNC: 19 MG/DL (ref 6–20)
BUN/CREAT SERPL: 38.8 (ref 7–25)
CALCIUM SPEC-SCNC: 9.4 MG/DL (ref 8.6–10.5)
CHLORIDE SERPL-SCNC: 97 MMOL/L (ref 98–107)
CO2 SERPL-SCNC: 31 MMOL/L (ref 22–29)
CREAT SERPL-MCNC: 0.49 MG/DL (ref 0.57–1)
DEPRECATED RDW RBC AUTO: 47.9 FL (ref 37–54)
EGFRCR SERPLBLD CKD-EPI 2021: 109.4 ML/MIN/1.73
EOSINOPHIL # BLD AUTO: 0.1 10*3/MM3 (ref 0–0.4)
EOSINOPHIL NFR BLD AUTO: 0.9 % (ref 0.3–6.2)
ERYTHROCYTE [DISTWIDTH] IN BLOOD BY AUTOMATED COUNT: 14.8 % (ref 12.3–15.4)
GLUCOSE BLDC GLUCOMTR-MCNC: 194 MG/DL (ref 70–130)
GLUCOSE BLDC GLUCOMTR-MCNC: 198 MG/DL (ref 70–130)
GLUCOSE BLDC GLUCOMTR-MCNC: 270 MG/DL (ref 70–130)
GLUCOSE SERPL-MCNC: 198 MG/DL (ref 65–99)
HCT VFR BLD AUTO: 42.3 % (ref 34–46.6)
HGB BLD-MCNC: 12.7 G/DL (ref 12–15.9)
IMM GRANULOCYTES # BLD AUTO: 0.07 10*3/MM3 (ref 0–0.05)
IMM GRANULOCYTES NFR BLD AUTO: 0.6 % (ref 0–0.5)
LYMPHOCYTES # BLD AUTO: 1.96 10*3/MM3 (ref 0.7–3.1)
LYMPHOCYTES NFR BLD AUTO: 17.6 % (ref 19.6–45.3)
MCH RBC QN AUTO: 26.5 PG (ref 26.6–33)
MCHC RBC AUTO-ENTMCNC: 30 G/DL (ref 31.5–35.7)
MCV RBC AUTO: 88.1 FL (ref 79–97)
MONOCYTES # BLD AUTO: 0.89 10*3/MM3 (ref 0.1–0.9)
MONOCYTES NFR BLD AUTO: 8 % (ref 5–12)
NEUTROPHILS NFR BLD AUTO: 72.6 % (ref 42.7–76)
NEUTROPHILS NFR BLD AUTO: 8.11 10*3/MM3 (ref 1.7–7)
NRBC BLD AUTO-RTO: 0 /100 WBC (ref 0–0.2)
PLATELET # BLD AUTO: 275 10*3/MM3 (ref 140–450)
PMV BLD AUTO: 10 FL (ref 6–12)
POTASSIUM SERPL-SCNC: 4.2 MMOL/L (ref 3.5–5.2)
RBC # BLD AUTO: 4.8 10*6/MM3 (ref 3.77–5.28)
SODIUM SERPL-SCNC: 137 MMOL/L (ref 136–145)
WBC NRBC COR # BLD: 11.16 10*3/MM3 (ref 3.4–10.8)

## 2022-10-30 PROCEDURE — 94664 DEMO&/EVAL PT USE INHALER: CPT

## 2022-10-30 PROCEDURE — 63710000001 INSULIN LISPRO (HUMAN) PER 5 UNITS: Performed by: PHYSICIAN ASSISTANT

## 2022-10-30 PROCEDURE — 80048 BASIC METABOLIC PNL TOTAL CA: CPT | Performed by: PHYSICIAN ASSISTANT

## 2022-10-30 PROCEDURE — 94799 UNLISTED PULMONARY SVC/PX: CPT

## 2022-10-30 PROCEDURE — 97530 THERAPEUTIC ACTIVITIES: CPT

## 2022-10-30 PROCEDURE — 99232 SBSQ HOSP IP/OBS MODERATE 35: CPT | Performed by: INTERNAL MEDICINE

## 2022-10-30 PROCEDURE — 63710000001 PREDNISONE PER 1 MG: Performed by: INTERNAL MEDICINE

## 2022-10-30 PROCEDURE — 94761 N-INVAS EAR/PLS OXIMETRY MLT: CPT

## 2022-10-30 PROCEDURE — 82962 GLUCOSE BLOOD TEST: CPT

## 2022-10-30 PROCEDURE — 25010000002 CEFTRIAXONE PER 250 MG: Performed by: PHYSICIAN ASSISTANT

## 2022-10-30 PROCEDURE — 25010000002 HEPARIN (PORCINE) PER 1000 UNITS: Performed by: PHYSICIAN ASSISTANT

## 2022-10-30 PROCEDURE — 25010000002 FUROSEMIDE PER 20 MG: Performed by: INTERNAL MEDICINE

## 2022-10-30 PROCEDURE — 97164 PT RE-EVAL EST PLAN CARE: CPT

## 2022-10-30 PROCEDURE — 85025 COMPLETE CBC W/AUTO DIFF WBC: CPT | Performed by: PHYSICIAN ASSISTANT

## 2022-10-30 RX ORDER — FUROSEMIDE 10 MG/ML
40 INJECTION INTRAMUSCULAR; INTRAVENOUS EVERY 8 HOURS
Status: COMPLETED | OUTPATIENT
Start: 2022-10-30 | End: 2022-10-30

## 2022-10-30 RX ADMIN — IPRATROPIUM BROMIDE AND ALBUTEROL SULFATE 3 ML: .5; 3 SOLUTION RESPIRATORY (INHALATION) at 11:29

## 2022-10-30 RX ADMIN — IPRATROPIUM BROMIDE AND ALBUTEROL SULFATE 3 ML: .5; 3 SOLUTION RESPIRATORY (INHALATION) at 07:26

## 2022-10-30 RX ADMIN — SODIUM CHLORIDE 1 G: 900 INJECTION INTRAVENOUS at 20:05

## 2022-10-30 RX ADMIN — ACETAMINOPHEN 650 MG: 325 TABLET, FILM COATED ORAL at 10:23

## 2022-10-30 RX ADMIN — HEPARIN SODIUM 5000 UNITS: 5000 INJECTION INTRAVENOUS; SUBCUTANEOUS at 13:27

## 2022-10-30 RX ADMIN — DOXYCYCLINE 100 MG: 100 INJECTION, POWDER, LYOPHILIZED, FOR SOLUTION INTRAVENOUS at 10:23

## 2022-10-30 RX ADMIN — IPRATROPIUM BROMIDE AND ALBUTEROL SULFATE 3 ML: .5; 3 SOLUTION RESPIRATORY (INHALATION) at 15:20

## 2022-10-30 RX ADMIN — FUROSEMIDE 40 MG: 10 INJECTION, SOLUTION INTRAMUSCULAR; INTRAVENOUS at 10:53

## 2022-10-30 RX ADMIN — IPRATROPIUM BROMIDE AND ALBUTEROL SULFATE 3 ML: .5; 3 SOLUTION RESPIRATORY (INHALATION) at 19:15

## 2022-10-30 RX ADMIN — TRAZODONE HYDROCHLORIDE 50 MG: 50 TABLET ORAL at 20:06

## 2022-10-30 RX ADMIN — Medication 5 MG: at 20:06

## 2022-10-30 RX ADMIN — PREDNISONE 40 MG: 20 TABLET ORAL at 10:23

## 2022-10-30 RX ADMIN — IPRATROPIUM BROMIDE AND ALBUTEROL SULFATE 3 ML: .5; 3 SOLUTION RESPIRATORY (INHALATION) at 23:27

## 2022-10-30 RX ADMIN — BUDESONIDE AND FORMOTEROL FUMARATE DIHYDRATE 2 PUFF: 80; 4.5 AEROSOL RESPIRATORY (INHALATION) at 07:26

## 2022-10-30 RX ADMIN — HEPARIN SODIUM 5000 UNITS: 5000 INJECTION INTRAVENOUS; SUBCUTANEOUS at 05:11

## 2022-10-30 RX ADMIN — INSULIN LISPRO 6 UNITS: 100 INJECTION, SOLUTION INTRAVENOUS; SUBCUTANEOUS at 18:26

## 2022-10-30 RX ADMIN — Medication 10 ML: at 10:53

## 2022-10-30 RX ADMIN — ACETAMINOPHEN 650 MG: 325 TABLET, FILM COATED ORAL at 15:13

## 2022-10-30 RX ADMIN — ACETAMINOPHEN 650 MG: 325 TABLET, FILM COATED ORAL at 20:10

## 2022-10-30 RX ADMIN — DOXYCYCLINE 100 MG: 100 INJECTION, POWDER, LYOPHILIZED, FOR SOLUTION INTRAVENOUS at 20:06

## 2022-10-30 RX ADMIN — INSULIN LISPRO 2 UNITS: 100 INJECTION, SOLUTION INTRAVENOUS; SUBCUTANEOUS at 10:24

## 2022-10-30 RX ADMIN — Medication 10 ML: at 20:06

## 2022-10-30 RX ADMIN — FUROSEMIDE 40 MG: 10 INJECTION, SOLUTION INTRAMUSCULAR; INTRAVENOUS at 18:25

## 2022-10-30 RX ADMIN — FENOFIBRATE 145 MG: 145 TABLET ORAL at 10:53

## 2022-10-30 RX ADMIN — BUDESONIDE AND FORMOTEROL FUMARATE DIHYDRATE 2 PUFF: 80; 4.5 AEROSOL RESPIRATORY (INHALATION) at 19:15

## 2022-10-31 ENCOUNTER — READMISSION MANAGEMENT (OUTPATIENT)
Dept: CALL CENTER | Facility: HOSPITAL | Age: 57
End: 2022-10-31

## 2022-10-31 VITALS
DIASTOLIC BLOOD PRESSURE: 80 MMHG | RESPIRATION RATE: 18 BRPM | HEART RATE: 94 BPM | HEIGHT: 67 IN | OXYGEN SATURATION: 91 % | BODY MASS INDEX: 44.32 KG/M2 | TEMPERATURE: 98.2 F | WEIGHT: 282.4 LBS | SYSTOLIC BLOOD PRESSURE: 131 MMHG

## 2022-10-31 PROBLEM — J96.01 ACUTE RESPIRATORY FAILURE WITH HYPOXIA (HCC): Status: RESOLVED | Noted: 2022-10-28 | Resolved: 2022-10-31

## 2022-10-31 PROBLEM — J44.1 COPD WITH ACUTE EXACERBATION (HCC): Status: RESOLVED | Noted: 2021-11-18 | Resolved: 2022-10-31

## 2022-10-31 LAB
ANION GAP SERPL CALCULATED.3IONS-SCNC: 12 MMOL/L (ref 5–15)
BASOPHILS # BLD AUTO: 0.05 10*3/MM3 (ref 0–0.2)
BASOPHILS NFR BLD AUTO: 0.5 % (ref 0–1.5)
BUN SERPL-MCNC: 19 MG/DL (ref 6–20)
BUN/CREAT SERPL: 33.9 (ref 7–25)
CALCIUM SPEC-SCNC: 8.9 MG/DL (ref 8.6–10.5)
CHLORIDE SERPL-SCNC: 96 MMOL/L (ref 98–107)
CO2 SERPL-SCNC: 31 MMOL/L (ref 22–29)
CREAT SERPL-MCNC: 0.56 MG/DL (ref 0.57–1)
DEPRECATED RDW RBC AUTO: 49.8 FL (ref 37–54)
EGFRCR SERPLBLD CKD-EPI 2021: 105.9 ML/MIN/1.73
EOSINOPHIL # BLD AUTO: 0.13 10*3/MM3 (ref 0–0.4)
EOSINOPHIL NFR BLD AUTO: 1.2 % (ref 0.3–6.2)
ERYTHROCYTE [DISTWIDTH] IN BLOOD BY AUTOMATED COUNT: 15 % (ref 12.3–15.4)
GLUCOSE BLDC GLUCOMTR-MCNC: 163 MG/DL (ref 70–130)
GLUCOSE BLDC GLUCOMTR-MCNC: 175 MG/DL (ref 70–130)
GLUCOSE SERPL-MCNC: 199 MG/DL (ref 65–99)
HCT VFR BLD AUTO: 43.3 % (ref 34–46.6)
HGB BLD-MCNC: 12.9 G/DL (ref 12–15.9)
IMM GRANULOCYTES # BLD AUTO: 0.05 10*3/MM3 (ref 0–0.05)
IMM GRANULOCYTES NFR BLD AUTO: 0.5 % (ref 0–0.5)
LYMPHOCYTES # BLD AUTO: 2.06 10*3/MM3 (ref 0.7–3.1)
LYMPHOCYTES NFR BLD AUTO: 19.3 % (ref 19.6–45.3)
MCH RBC QN AUTO: 26.9 PG (ref 26.6–33)
MCHC RBC AUTO-ENTMCNC: 29.8 G/DL (ref 31.5–35.7)
MCV RBC AUTO: 90.4 FL (ref 79–97)
MONOCYTES # BLD AUTO: 0.69 10*3/MM3 (ref 0.1–0.9)
MONOCYTES NFR BLD AUTO: 6.5 % (ref 5–12)
NEUTROPHILS NFR BLD AUTO: 7.67 10*3/MM3 (ref 1.7–7)
NEUTROPHILS NFR BLD AUTO: 72 % (ref 42.7–76)
NRBC BLD AUTO-RTO: 0 /100 WBC (ref 0–0.2)
PLAT MORPH BLD: NORMAL
PLATELET # BLD AUTO: 256 10*3/MM3 (ref 140–450)
PMV BLD AUTO: 10.7 FL (ref 6–12)
POTASSIUM SERPL-SCNC: 4.1 MMOL/L (ref 3.5–5.2)
RBC # BLD AUTO: 4.79 10*6/MM3 (ref 3.77–5.28)
RBC MORPH BLD: NORMAL
SODIUM SERPL-SCNC: 139 MMOL/L (ref 136–145)
WBC MORPH BLD: NORMAL
WBC NRBC COR # BLD: 10.65 10*3/MM3 (ref 3.4–10.8)

## 2022-10-31 PROCEDURE — 25010000002 HEPARIN (PORCINE) PER 1000 UNITS: Performed by: PHYSICIAN ASSISTANT

## 2022-10-31 PROCEDURE — 90686 IIV4 VACC NO PRSV 0.5 ML IM: CPT | Performed by: INTERNAL MEDICINE

## 2022-10-31 PROCEDURE — 82962 GLUCOSE BLOOD TEST: CPT

## 2022-10-31 PROCEDURE — 63710000001 INSULIN LISPRO (HUMAN) PER 5 UNITS: Performed by: PHYSICIAN ASSISTANT

## 2022-10-31 PROCEDURE — 63710000001 PREDNISONE PER 1 MG: Performed by: INTERNAL MEDICINE

## 2022-10-31 PROCEDURE — 99239 HOSP IP/OBS DSCHRG MGMT >30: CPT | Performed by: INTERNAL MEDICINE

## 2022-10-31 PROCEDURE — 80048 BASIC METABOLIC PNL TOTAL CA: CPT | Performed by: PHYSICIAN ASSISTANT

## 2022-10-31 PROCEDURE — 85007 BL SMEAR W/DIFF WBC COUNT: CPT | Performed by: PHYSICIAN ASSISTANT

## 2022-10-31 PROCEDURE — G0008 ADMIN INFLUENZA VIRUS VAC: HCPCS | Performed by: INTERNAL MEDICINE

## 2022-10-31 PROCEDURE — 25010000002 INFLUENZA VAC SPLIT QUAD 0.5 ML SUSPENSION PREFILLED SYRINGE: Performed by: INTERNAL MEDICINE

## 2022-10-31 PROCEDURE — 94799 UNLISTED PULMONARY SVC/PX: CPT

## 2022-10-31 PROCEDURE — 29580 STRAPPING UNNA BOOT: CPT

## 2022-10-31 PROCEDURE — 85025 COMPLETE CBC W/AUTO DIFF WBC: CPT | Performed by: PHYSICIAN ASSISTANT

## 2022-10-31 RX ORDER — PREDNISONE 20 MG/1
40 TABLET ORAL DAILY
Qty: 2 TABLET | Refills: 0 | Status: SHIPPED | OUTPATIENT
Start: 2022-11-01 | End: 2022-11-23

## 2022-10-31 RX ORDER — DOXYCYCLINE HYCLATE 100 MG/1
100 CAPSULE ORAL 2 TIMES DAILY
Qty: 4 CAPSULE | Refills: 0 | Status: SHIPPED | OUTPATIENT
Start: 2022-10-31 | End: 2022-11-23

## 2022-10-31 RX ORDER — BUDESONIDE AND FORMOTEROL FUMARATE DIHYDRATE 80; 4.5 UG/1; UG/1
2 AEROSOL RESPIRATORY (INHALATION)
Qty: 1 EACH | Refills: 12 | Status: CANCELLED | OUTPATIENT
Start: 2022-10-31

## 2022-10-31 RX ORDER — FUROSEMIDE 40 MG/1
40 TABLET ORAL DAILY
Qty: 30 TABLET | Refills: 0 | Status: SHIPPED | OUTPATIENT
Start: 2022-10-31

## 2022-10-31 RX ORDER — IPRATROPIUM BROMIDE AND ALBUTEROL SULFATE 2.5; .5 MG/3ML; MG/3ML
3 SOLUTION RESPIRATORY (INHALATION) EVERY 8 HOURS PRN
Qty: 360 ML | Refills: 0 | Status: CANCELLED | OUTPATIENT
Start: 2022-10-31

## 2022-10-31 RX ADMIN — ACETAMINOPHEN 650 MG: 325 TABLET, FILM COATED ORAL at 05:30

## 2022-10-31 RX ADMIN — Medication 10 ML: at 09:36

## 2022-10-31 RX ADMIN — HEPARIN SODIUM 5000 UNITS: 5000 INJECTION INTRAVENOUS; SUBCUTANEOUS at 05:30

## 2022-10-31 RX ADMIN — INSULIN LISPRO 2 UNITS: 100 INJECTION, SOLUTION INTRAVENOUS; SUBCUTANEOUS at 09:37

## 2022-10-31 RX ADMIN — IPRATROPIUM BROMIDE AND ALBUTEROL SULFATE 3 ML: .5; 3 SOLUTION RESPIRATORY (INHALATION) at 07:18

## 2022-10-31 RX ADMIN — PREDNISONE 40 MG: 20 TABLET ORAL at 09:37

## 2022-10-31 RX ADMIN — BUDESONIDE AND FORMOTEROL FUMARATE DIHYDRATE 2 PUFF: 80; 4.5 AEROSOL RESPIRATORY (INHALATION) at 07:18

## 2022-10-31 RX ADMIN — FENOFIBRATE 145 MG: 145 TABLET ORAL at 09:37

## 2022-10-31 RX ADMIN — INFLUENZA VIRUS VACCINE 0.5 ML: 15; 15; 15; 15 SUSPENSION INTRAMUSCULAR at 13:15

## 2022-10-31 RX ADMIN — INSULIN LISPRO 2 UNITS: 100 INJECTION, SOLUTION INTRAVENOUS; SUBCUTANEOUS at 13:14

## 2022-10-31 RX ADMIN — DOXYCYCLINE 100 MG: 100 INJECTION, POWDER, LYOPHILIZED, FOR SOLUTION INTRAVENOUS at 09:36

## 2022-11-02 LAB
BACTERIA SPEC AEROBE CULT: NORMAL
BACTERIA SPEC AEROBE CULT: NORMAL

## 2022-11-02 NOTE — PAYOR COMM NOTE
"Addy Lee (56 y.o. Female)     [id number]    Tiera Washburn RN  Utilization Review  Illbd-[phone]  Ccw-[phone]      Date of Birth   1965    Social Security Number       Address   [address] [place] [address]    Home Phone   [phone]    MRN   [id number]       Jainism   Adventism    Marital Status                               Admission Date   10/28/22    Admission Type   Emergency    Admitting Provider   Caitlin Galdamez MD    Attending Provider       Department, Room/Bed   [address], S477/1       Discharge Date   10/31/2022    Discharge Disposition   Home or Self Care    Discharge Destination                               Attending Provider: (none)   Allergies: No Known Allergies    Isolation: None   Infection: COVID (History) (10/31/22)   Code Status: Prior    Ht: 170 cm (66.93\")   Wt: 128 kg (282 lb 6.4 oz)    Admission Cmt: None   Principal Problem: Acute respiratory failure with hypoxia (HCC) [J96.01]                 Active Insurance as of 10/28/2022     Primary Coverage     Payor Plan Insurance Group Employer/Plan Group    [employer] [place] MEDICARE REPLACEMENT WELL[employer]ARE MEDICARE REPLACEMENT      Payor Plan Address Payor Plan Phone Number Payor Plan Fax Number Effective Dates    [address] [address] [phone]  9/1/2020 - None Entered    [facility] [address]       Subscriber Name Subscriber Birth Date Member ID       ADDY LEE 1965 [id number]           Secondary Coverage     Payor Plan Insurance Group Employer/Plan Group    [place]      Payor Plan Address Payor Plan Phone Number Payor Plan Fax Number Effective Dates    [address] [phone]  1/1/2020 - None Entered    [facility] [id number]       Subscriber Name Subscriber Birth Date Member ID       ADDY LEE 1965 [id number]                 Emergency Contacts      (Rel.) Home Phone Work Phone Mobile Phone    ANDREA AIKEN (Daughter) " [phone] -- [phone]               Discharge Summary      Caitlin Galdamez MD at 10/31/22 1107              [facility] Medicine Services  DISCHARGE SUMMARY    Patient Name: Jovanna Ching  : 1965  MRN: [id number]    Date of Admission: 10/28/2022  1:28 PM  Date of Discharge: 10/31/2022  Primary Care Physician: Lulu Love APRN    Consults     No orders found from 2022 to 10/29/2022.          Hospital Course     Presenting Problem:   Acute respiratory failure with hypoxia (HCC) [J96.01]    Active Hospital Problems    Diagnosis  POA   • Leukocytosis [D72.829]  Yes   • COPD (chronic obstructive pulmonary disease) (HCC) [J44.9]  Yes   • Chronic respiratory failure with hypoxia (on 3 L nasal cannula) [J96.11]  Yes   • Essential hypertension [I10]  Yes   • Type 2 diabetes mellitus (HCC) [E11.9]  Yes      Resolved Hospital Problems    Diagnosis Date Resolved POA   • **Acute respiratory failure with hypoxia (HCC) [J96.01] 10/31/2022 Yes   • COPD with acute exacerbation (HCC) [J44.1] 10/31/2022 Yes          Hospital Course:  Jovanna Ching is a 56 y.o. female with a history of Chronic Resp failure 2ry to COPD (3L Nc at home), HTN, HLD, T2DM, and morbid obesity who presents to [facility] ED for complaint of pain and redness of the lower extremities. CTA chest negative for PE or pneumonia. Her last echo on 2021 shows EF 61-65%, moderate TR, mild pulm HTN noted. Venous duplex negative for DVT.  Patient was noted to require up to 5 L during admission.  She was started on prednisone for COPD exacerbation.  She was given Solu-Medrol and duo nebs.  She also had pretty significant lower extremity edema so was given IV Lasix.  There is also concern for bilateral cellulitis that she also has a venous stasis ulcer.  She had a mild leukocytosis on presentation.  She was started on IV Rocephin and doxycycline.    Patient will be discharged with an additional 2 days of doxycycline to complete  a 5 day course of antibiotics for her cellulitis. She did receive IV rocephin for 3 days in the hospital as well. Suspect her underlying peripheral edema contributing to her LE cellulitis. Unna boots were placed. I have made a referral to wound clinic for continued management.     Lasix dose was increased to 40mg daily from 20mg daily for better control of edema. Recommend PCP monitor creatinine in 1-2 weeks.     Patient had a COPD exacerbation with acute hypoxic respiratory failure on presentation. Required oxygen up to 6L during admission. Improved with steroids. She is back to her baseline oxygen requirements on discharge. She will continue to use her Anoro Elipta inhailer, prn albuterol. She will have an additional 2 days of prednisone to complete 5 days of prednisone. She will also have an additional 2 days of doxycycline.     Patient states that she has a follow-up with her PCP on 11/1/2022    Discharge Follow Up Recommendations for outpatient labs/diagnostics:  Follow-up with PCP on 11/1/2022, recommend checking BMP in approximately 2 weeks for creatinine as we have increased her diuretic dose    Day of Discharge     HPI:   Patient is feeling like she is back to her normal status, denies any shortness of breath.  On 3 L.  Denies any coughing.  Edema much better with Unna boots.  Feels ready to go home.    Review of Systems  General: denies fevers or chills  CV: denies chest pain  Resp: denies shortness of breath  Abd: denies abd pain, nausea      Vital Signs:   Temp:  [97.7 °F (36.5 °C)-98.7 °F (37.1 °C)] 98.2 °F (36.8 °C)  Heart Rate:  [] 81  Resp:  [16-20] 20  BP: (120-131)/(68-80) 131/80  Flow (L/min):  [3] 3      Physical Exam:  Constitutional: No acute distress, awake, alert  Respiratory: Decreased breath sounds bilaterally, respiratory effort normal on 3 L  Cardiovascular: RRR, no murmurs, rubs, or gallops  Gastrointestinal: Positive bowel sounds, soft, nontender, nondistended  Musculoskeletal:  Bilateral lower extremities and Unna boots  Psychiatric: Appropriate affect, cooperative  Neurologic: Oriented x 3, no focal neurological deficits      Pertinent  and/or Most Recent Results     LAB RESULTS:      Lab 10/31/22  0602 10/30/22  0755 10/29/22  0906 10/28/22  1524   WBC 10.65 11.16* 11.15* 12.43*   HEMOGLOBIN 12.9 12.7 13.7 13.8   HEMATOCRIT 43.3 42.3 44.5 45.6   PLATELETS 256 275 287 312   NEUTROS ABS 7.67* 8.11* 9.32* 10.31*   IMMATURE GRANS (ABS) 0.05 0.07* 0.08* 0.06*   LYMPHS ABS 2.06 1.96 1.21 1.17   MONOS ABS 0.69 0.89 0.51 0.69   EOS ABS 0.13 0.10 0.00 0.15   MCV 90.4 88.1 86.9 88.5   PROCALCITONIN  --   --   --  0.04   LACTATE  --   --   --  0.7         Lab 10/31/22  0602 10/30/22  0755 10/29/22  0906 10/28/22  1524   SODIUM 139 137 139 138   POTASSIUM 4.1 4.2 4.2 3.9   CHLORIDE 96* 97* 97* 95*   CO2 31.0* 31.0* 30.0* 35.0*   ANION GAP 12.0 9.0 12.0 8.0   BUN 19 19 15 11   CREATININE 0.56* 0.49* 0.50* 0.61   EGFR 105.9 109.4 108.9 103.8   GLUCOSE 199* 198* 172* 173*   CALCIUM 8.9 9.4 9.4 9.7   HEMOGLOBIN A1C  --   --  7.40*  --          Lab 10/28/22  1524   TOTAL PROTEIN 7.6   ALBUMIN 4.00   GLOBULIN 3.6   ALT (SGPT) 23   AST (SGOT) 17   BILIRUBIN 0.2   ALK PHOS 126*         Lab 10/28/22  1524   PROBNP 39.7   TROPONIN T <0.010                 Brief Urine Lab Results     None        Microbiology Results (last 10 days)     Procedure Component Value - Date/Time    Blood Culture - Blood, Hand, Left [[id number]]  (Normal) Collected: 10/28/22 1555    Lab Status: Preliminary result Specimen: Blood from Hand, Left Updated: 10/30/22 1616     Blood Culture No growth at 2 days    Blood Culture - Blood, Arm, Left [[id number]]  (Normal) Collected: 10/28/22 1549    Lab Status: Preliminary result Specimen: Blood from Arm, Left Updated: 10/30/22 1616     Blood Culture No growth at 2 days          XR Chest 1 View    Result Date: 10/28/2022  DATE OF EXAM: 10/28/2022 2:17 PM  PROCEDURE: XR CHEST 1 VW-  INDICATIONS:  soa. Cough covid. chf?  COMPARISON: 10/13/2020  TECHNIQUE: Single radiographic view of the chest was obtained.  FINDINGS: There is prominence of the pulmonary vasculature as well as interstitial and some groundglass alveolar opacities. Cardiac and mediastinal contours are within normal limits. No evidence of pneumothorax. No significant effusions.       1. Interstitial and alveolar opacities suggestive of pulmonary edema, or less likely atypical infection. Slightly progressed compared to prior.  This report was finalized on 10/28/2022 2:25 PM by Joaquin Montero MD.      Duplex Venous Lower Extremity - Bilateral    Result Date: 10/28/2022  •  Normal bilateral lower extremity venous duplex scan.     CT Angiogram Chest    Result Date: 10/28/2022  DATE OF EXAM: 10/28/2022 5:19 PM  PROCEDURE: CT ANGIOGRAM CHEST-  INDICATIONS: pe. hypoxia.  COMPARISON: Chest radiograph from earlier today and CT chest from October 10, 2022  TECHNIQUE: Contiguous axial imaging was obtained from the thoracic inlet through the upper abdomen following the intravenous administration of 90 mL of Isovue 370. Reconstructed coronal and sagittal images were also obtained. Automated exposure control and iterative reconstruction methods were used.  The radiation dose reduction device was turned on for each scan per the ALARA (As Low as Reasonably Achievable) protocol.  FINDINGS:  The central tracheobronchial tree is clear. There is mild emphysema. There is no focal consolidation. There is no pleural effusion.  The heart size appears normal. The great vessels are normal in caliber. There is no evidence of pulmonary embolus. No abnormally enlarged lymph nodes are identified. The right thyroid gland appears enlarged, which is nonspecific.  Partial evaluation of the upper abdomen is unremarkable.  No aggressive osseous lesions are identified.      1.  Negative for pulmonary embolus. 2.  No acute cardiopulmonary process. 3.  Mild emphysema.  This report was  finalized on 10/28/2022 5:57 PM by Usman Londono MD.        Results for orders placed during the hospital encounter of 10/28/22    Duplex Venous Lower Extremity - Bilateral    Interpretation Summary  •  Normal bilateral lower extremity venous duplex scan.      Results for orders placed during the hospital encounter of 10/28/22    Duplex Venous Lower Extremity - Bilateral    Interpretation Summary  •  Normal bilateral lower extremity venous duplex scan.      Results for orders placed during the hospital encounter of 11/18/21    Adult Transthoracic Echo Complete w/ Color, Spectral and Contrast if necessary per protocol    Interpretation Summary  · Left ventricular ejection fraction appears to be 61 - 65%. Left ventricular systolic function is normal.  · Left ventricular diastolic function was normal.  · The right ventricular cavity is mildly dilated.  · Moderate tricuspid valve regurgitation is present.  · Estimated right ventricular systolic pressure from tricuspid regurgitation is mildly elevated (35-45 mmHg).  · Mild pulmonary hypertension is present.      Plan for Follow-up of Pending Labs/Results:   Pending Labs     Order Current Status    Blood Culture - Blood, Arm, Left Preliminary result    Blood Culture - Blood, Hand, Left Preliminary result        Discharge Details        Discharge Medications      New Medications      Instructions Start Date   doxycycline 100 MG capsule  Commonly known as: VIBRAMYCIN   100 mg, Oral, 2 Times Daily      predniSONE 20 MG tablet  Commonly known as: DELTASONE   40 mg, Oral, Daily   Start Date: November 1, 2022        Changes to Medications      Instructions Start Date   furosemide 40 MG tablet  Commonly known as: LASIX  What changed: how much to take   40 mg, Oral, Daily         Continue These Medications      Instructions Start Date   albuterol (2.5 MG/3ML) 0.083% nebulizer solution  Commonly known as: PROVENTIL   2.5 mg, Nebulization, Every 4 Hours PRN      Anoro Ellipta  62.5-25 MCG/ACT aerosol powder  inhaler  Generic drug: Umeclidinium-Vilanterol   1 puff, Inhalation, Daily - RT      fenofibrate 160 MG tablet   160 mg, Oral, Daily      HYDROcodone-acetaminophen 5-325 MG per tablet  Commonly known as: NORCO   1 tablet, Oral, Every 8 Hours PRN      metFORMIN 500 MG tablet  Commonly known as: GLUCOPHAGE   500 mg, Oral, Daily With Breakfast      potassium chloride 10 MEQ CR capsule  Commonly known as: MICRO-K   Take 1 capsule by mouth daily when you take Furosemide (Lasix)      traZODone 50 MG tablet  Commonly known as: DESYREL    mg, Oral, Nightly PRN             No Known Allergies      Discharge Disposition:  Home or Self Care    Diet:  Hospital:  Diet Order   Procedures   • Diet Regular; Consistent Carbohydrate       Activity:  Activity Instructions     Activity as Tolerated            Restrictions or Other Recommendations:         CODE STATUS:    Code Status and Medical Interventions:   Ordered at: 10/28/22 2000     Code Status (Patient has no pulse and is not breathing):    CPR (Attempt to Resuscitate)     Medical Interventions (Patient has pulse or is breathing):    Full Support       Future Appointments   Date Time Provider Department Center   11/28/2022 11:00 AM [facility], P[facility] LAB 1 [facility]   11/28/2022 11:30 AM Veronica Rothman APRN [facility]       Additional Instructions for the Follow-ups that You Need to Schedule     Ambulatory Referral to Wound Clinic   As directed                  Caitlin Galdamez MD  10/31/22      Time Spent on Discharge:  I spent  40  minutes on this discharge activity which included: face-to-face encounter with the patient, reviewing the data in the system, coordination of the care with the nursing staff as well as consultants, documentation, and entering orders.            Electronically signed by Caitlin Galdamez MD at 10/31/22 7907

## 2022-11-04 ENCOUNTER — READMISSION MANAGEMENT (OUTPATIENT)
Dept: CALL CENTER | Facility: HOSPITAL | Age: 57
End: 2022-11-04

## 2022-11-07 ENCOUNTER — APPOINTMENT (OUTPATIENT)
Dept: PHYSICAL THERAPY | Facility: HOSPITAL | Age: 57
End: 2022-11-07

## 2022-11-08 ENCOUNTER — READMISSION MANAGEMENT (OUTPATIENT)
Dept: CALL CENTER | Facility: HOSPITAL | Age: 57
End: 2022-11-08

## 2022-11-08 NOTE — OUTREACH NOTE
COPD/PN Week 1 Survey    Flowsheet Row Responses   [facility] patient discharged from? [place]   Does the patient have one of the following disease processes/diagnoses(primary or secondary)? COPD   Week 1 attempt successful? Yes   Call start time 0832   Call end time 0840   Discharge diagnosis Acute hypoxic resp failure, Acute COPD exacerbation, T2DM   Is patient permission given to speak with other caregiver? Yes   List who call center can speak with Amanda bedoya   Person spoke with today (if not patient) and relationship daughterAmanda   Meds reviewed with patient/caregiver? Yes   Does the patient have all medications ordered at discharge? Yes   Is the patient taking all medications as directed (includes completed medication regime)? Yes   Does the patient have a primary care provider?  Yes   Does the patient have an appointment with their PCP or specialist within 7 days of discharge? Greater than 7 days   Comments regarding PCP GUILLERMO Merrill PCP. Daughter reports that she thinks patient has PCP appt next week.    Has the patient kept scheduled appointments due by today? N/A   Comments PULMONARY Follow Up with GUILLERMO Trevizo Monday Nov 28, 2022 11:30 AM   What is the Home health agency?  Daughter reports that patient has a HH nurse now that comes once a week.    Home health comments Daughter states HH changes the Unna boots.    DME comments Patient has home O2 and a home nebulizer.    Pulse Ox monitoring Intermittent   Pulse Ox device source Patient   O2 Sat comments Dtr not with patient   Psychosocial issues? No   What is the patient's perception of their health status since discharge? Improving   If the patient is a current smoker, are they able to teach back resources for cessation? --  [Daughter reports patient current smoker. Discussed fire hazards with O2 and smoking. Daughter reports patient goes outside to smoke. ]   Is the patient able to teach back COPD zones? --  [Unable to  review zones with patient today. Spoke with daughter. ]   Week 1 call completed? Yes          HECTOR MINA - Registered Nurse

## 2022-11-12 NOTE — PROGRESS NOTES
"Enter Query Response Below      Query Response:     - Acute on Chronic Diastolic Congestive heart failure  Electronically signed by Caitlin Galdamez MD, 22, 1:10 PM EST.           If applicable, please update the problem list.           Patient: Jovanna Ching        : 1965  Account: [id number]           Admit Date: 10/28/2022        How to Respond to this query:       a. Click New Note     b. Answer query within the yellow box.                c. Update the Problem List, if applicable.        ,     56 year old patient with a history of diastolic CHF, COPD ( home 3L NC), type 2 DM,  HTN is admitted for COPD exacerbation, A/C respiratory failure, lower extremity edema and bilateral LE Cellulitis.  Patient takes Lasix at home. She is not on a Beta blocker.     Admit CXR \"Interstitial and alveolar opacities suggestive of pulmonary edema, or less likely atypical infection.\"  Admit proBNP 39.7.    H&P \"Acute on chronic HFpEF-She has a dx of CHF in her problem list. Her last echo on 2021 shows EF 61-65%, moderate TR, mild pulm HTN noted-Extra lasix 20mg iv x 1 now; continue home lasix.\"    Patient was treated with Lasix IV and PO.    Discharge summary does not include Congestive heart failure, but does state \"Lasix dose was increased to 40mg daily from 20mg daily for better control of edema.\"    Please further clarify this patients condition as:    - Acute on Chronic Diastolic Congestive heart failure  - Other____________( please specify)  - Unable to determine    By submitting this query, we are merely seeking further clarification of documentation to accurately reflect all conditions that you are monitoring, evaluating, treating or that extend the hospitalization or utilize additional resources of care. Please utilize your independent clinical judgment when addressing the question(s) above.     This query and your response, once completed, will be entered into the legal medical " record.    Sincerely,  Kumar Rice RN CDI  Clinical Documentation Integrity Program   [email or web address]

## 2022-11-12 NOTE — PROGRESS NOTES
Enter Query Response Below      Query Response:   - Due to type 2 Diabetes Mellitus  Electronically signed by Caitlin Galdamez MD, 22, 1:09 PM EST.               If applicable, please update the problem list.           Patient: Jovanna Ching        : 1965  Account: [id number]           Admit Date: 10/28/2022        How to Respond to this query:       a. Click New Note     b. Answer query within the yellow box.                c. Update the Problem List, if applicable.    ,     56 year old patient with COPD ( home 3L NC), type 2 DM, HTN and current smoker is admitted for COPD exacerbation, A/C respiratory failure and bilateral LE Cellulitis.  Patient takes Metformin at home.  Admit A1c 7.40  Admit glucose 173. Subsequent Glucose levels were between 163-270.  Patient was treated with Sliding scale insulin, Vibramycin, Rocephin and Unna boots.    Please further clarify the Cellulitis as:    - Due to type 2 Diabetes Mellitus  - Unrelated to Type 2 Diabetes Mellitus  - Other_______________( please specify)  - Unable to determine    By submitting this query, we are merely seeking further clarification of documentation to accurately reflect all conditions that you are monitoring, evaluating, treating or that extend the hospitalization or utilize additional resources of care. Please utilize your independent clinical judgment when addressing the question(s) above.     This query and your response, once completed, will be entered into the legal medical record.    Sincerely,  Kumar Rice RN CDI  Clinical Documentation Integrity Program   [email or web address]

## 2022-11-17 ENCOUNTER — READMISSION MANAGEMENT (OUTPATIENT)
Dept: CALL CENTER | Facility: HOSPITAL | Age: 57
End: 2022-11-17

## 2022-11-17 NOTE — OUTREACH NOTE
COPD/PN Week 2 Survey    Flowsheet Row Responses   [facility] facility patient discharged from? [place]   Does the patient have one of the following disease processes/diagnoses(primary or secondary)? COPD   Week 2 attempt successful? No   Unsuccessful attempts Attempt 1          SOHAN Jasmine Licensed Nurse

## 2022-11-23 ENCOUNTER — READMISSION MANAGEMENT (OUTPATIENT)
Dept: CALL CENTER | Facility: HOSPITAL | Age: 57
End: 2022-11-23

## 2022-11-23 ENCOUNTER — HOSPITAL ENCOUNTER (INPATIENT)
Facility: HOSPITAL | Age: 57
LOS: 3 days | Discharge: HOME | End: 2022-11-26
Attending: EMERGENCY MEDICINE | Admitting: INTERNAL MEDICINE

## 2022-11-23 ENCOUNTER — APPOINTMENT (OUTPATIENT)
Dept: GENERAL RADIOLOGY | Facility: HOSPITAL | Age: 57
End: 2022-11-23

## 2022-11-23 DIAGNOSIS — J96.22 ACUTE ON CHRONIC RESPIRATORY FAILURE WITH HYPOXIA AND HYPERCAPNIA: ICD-10-CM

## 2022-11-23 DIAGNOSIS — J96.21 ACUTE ON CHRONIC RESPIRATORY FAILURE WITH HYPOXIA AND HYPERCAPNIA: ICD-10-CM

## 2022-11-23 DIAGNOSIS — J44.1 COPD WITH ACUTE EXACERBATION: Primary | ICD-10-CM

## 2022-11-23 PROBLEM — J96.20 ACUTE-ON-CHRONIC RESPIRATORY FAILURE: Status: ACTIVE | Noted: 2022-11-23

## 2022-11-23 LAB
ALBUMIN SERPL-MCNC: 3.8 G/DL (ref 3.5–5.2)
ALBUMIN/GLOB SERPL: 1.2 G/DL
ALP SERPL-CCNC: 135 U/L (ref 39–117)
ALT SERPL W P-5'-P-CCNC: 27 U/L (ref 1–33)
ANION GAP SERPL CALCULATED.3IONS-SCNC: 7 MMOL/L (ref 5–15)
ARTERIAL PATENCY WRIST A: ABNORMAL
AST SERPL-CCNC: 24 U/L (ref 1–32)
ATMOSPHERIC PRESS: ABNORMAL MM[HG]
B PARAPERT DNA SPEC QL NAA+PROBE: NOT DETECTED
B PERT DNA SPEC QL NAA+PROBE: NOT DETECTED
BASE EXCESS BLDA CALC-SCNC: 7.6 MMOL/L (ref 0–2)
BASOPHILS # BLD AUTO: 0.04 10*3/MM3 (ref 0–0.2)
BASOPHILS NFR BLD AUTO: 0.3 % (ref 0–1.5)
BDY SITE: ABNORMAL
BILIRUB SERPL-MCNC: 0.2 MG/DL (ref 0–1.2)
BODY TEMPERATURE: 37 C
BUN SERPL-MCNC: 13 MG/DL (ref 6–20)
BUN/CREAT SERPL: 29.5 (ref 7–25)
C PNEUM DNA NPH QL NAA+NON-PROBE: NOT DETECTED
CALCIUM SPEC-SCNC: 8.8 MG/DL (ref 8.6–10.5)
CHLORIDE SERPL-SCNC: 99 MMOL/L (ref 98–107)
CO2 BLDA-SCNC: 38.6 MMOL/L (ref 22–33)
CO2 SERPL-SCNC: 33 MMOL/L (ref 22–29)
COHGB MFR BLD: 3.7 % (ref 0–2)
CREAT SERPL-MCNC: 0.44 MG/DL (ref 0.57–1)
D-LACTATE SERPL-SCNC: 0.6 MMOL/L (ref 0.5–2)
DEPRECATED RDW RBC AUTO: 53.3 FL (ref 37–54)
EGFRCR SERPLBLD CKD-EPI 2021: 111.6 ML/MIN/1.73
EOSINOPHIL # BLD AUTO: 0.09 10*3/MM3 (ref 0–0.4)
EOSINOPHIL NFR BLD AUTO: 0.8 % (ref 0.3–6.2)
EPAP: 0
ERYTHROCYTE [DISTWIDTH] IN BLOOD BY AUTOMATED COUNT: 15.6 % (ref 12.3–15.4)
FLUAV SUBTYP SPEC NAA+PROBE: NOT DETECTED
FLUAV SUBTYP SPEC NAA+PROBE: NOT DETECTED
FLUBV RNA ISLT QL NAA+PROBE: NOT DETECTED
FLUBV RNA ISLT QL NAA+PROBE: NOT DETECTED
GLOBULIN UR ELPH-MCNC: 3.2 GM/DL
GLUCOSE SERPL-MCNC: 148 MG/DL (ref 65–99)
HADV DNA SPEC NAA+PROBE: NOT DETECTED
HCO3 BLDA-SCNC: 36.4 MMOL/L (ref 20–26)
HCOV 229E RNA SPEC QL NAA+PROBE: NOT DETECTED
HCOV HKU1 RNA SPEC QL NAA+PROBE: NOT DETECTED
HCOV NL63 RNA SPEC QL NAA+PROBE: NOT DETECTED
HCOV OC43 RNA SPEC QL NAA+PROBE: NOT DETECTED
HCT VFR BLD AUTO: 41.1 % (ref 34–46.6)
HCT VFR BLD CALC: 39.8 % (ref 38–51)
HGB BLD-MCNC: 11.8 G/DL (ref 12–15.9)
HGB BLDA-MCNC: 13 G/DL (ref 14–18)
HMPV RNA NPH QL NAA+NON-PROBE: NOT DETECTED
HOLD SPECIMEN: NORMAL
HPIV1 RNA ISLT QL NAA+PROBE: NOT DETECTED
HPIV2 RNA SPEC QL NAA+PROBE: NOT DETECTED
HPIV3 RNA NPH QL NAA+PROBE: NOT DETECTED
HPIV4 P GENE NPH QL NAA+PROBE: NOT DETECTED
IMM GRANULOCYTES # BLD AUTO: 0.1 10*3/MM3 (ref 0–0.05)
IMM GRANULOCYTES NFR BLD AUTO: 0.9 % (ref 0–0.5)
INHALED O2 CONCENTRATION: 42 %
IPAP: 0
LYMPHOCYTES # BLD AUTO: 1.69 10*3/MM3 (ref 0.7–3.1)
LYMPHOCYTES NFR BLD AUTO: 14.5 % (ref 19.6–45.3)
Lab: ABNORMAL
M PNEUMO IGG SER IA-ACNC: NOT DETECTED
MCH RBC QN AUTO: 26.6 PG (ref 26.6–33)
MCHC RBC AUTO-ENTMCNC: 28.7 G/DL (ref 31.5–35.7)
MCV RBC AUTO: 92.8 FL (ref 79–97)
METHGB BLD QL: 0.4 % (ref 0–1.5)
MODALITY: ABNORMAL
MONOCYTES # BLD AUTO: 0.99 10*3/MM3 (ref 0.1–0.9)
MONOCYTES NFR BLD AUTO: 8.5 % (ref 5–12)
NEUTROPHILS NFR BLD AUTO: 75 % (ref 42.7–76)
NEUTROPHILS NFR BLD AUTO: 8.76 10*3/MM3 (ref 1.7–7)
NOTE: ABNORMAL
NOTIFIED BY: ABNORMAL
NOTIFIED WHO: ABNORMAL
NRBC BLD AUTO-RTO: 0 /100 WBC (ref 0–0.2)
NT-PROBNP SERPL-MCNC: 93.6 PG/ML (ref 0–900)
OXYHGB MFR BLDV: 89.9 % (ref 94–99)
PAW @ PEAK INSP FLOW SETTING VENT: 0 CMH2O
PCO2 BLDA: 71.9 MM HG (ref 35–45)
PCO2 TEMP ADJ BLD: 71.9 MM HG (ref 35–45)
PH BLDA: 7.31 PH UNITS (ref 7.35–7.45)
PH, TEMP CORRECTED: 7.31 PH UNITS
PLATELET # BLD AUTO: 306 10*3/MM3 (ref 140–450)
PMV BLD AUTO: 9.7 FL (ref 6–12)
PO2 BLDA: 74.5 MM HG (ref 83–108)
PO2 TEMP ADJ BLD: 74.5 MM HG (ref 83–108)
POTASSIUM SERPL-SCNC: 4.4 MMOL/L (ref 3.5–5.2)
PROCALCITONIN SERPL-MCNC: <0.02 NG/ML (ref 0–0.25)
PROT SERPL-MCNC: 7 G/DL (ref 6–8.5)
RBC # BLD AUTO: 4.43 10*6/MM3 (ref 3.77–5.28)
RHINOVIRUS RNA SPEC NAA+PROBE: NOT DETECTED
RSV RNA NPH QL NAA+NON-PROBE: NOT DETECTED
SARS-COV-2 RNA NPH QL NAA+NON-PROBE: NOT DETECTED
SARS-COV-2 RNA PNL SPEC NAA+PROBE: NOT DETECTED
SODIUM SERPL-SCNC: 139 MMOL/L (ref 136–145)
TOTAL RATE: 0 BREATHS/MINUTE
TROPONIN T SERPL-MCNC: <0.01 NG/ML (ref 0–0.03)
WBC NRBC COR # BLD: 11.67 10*3/MM3 (ref 3.4–10.8)
WHOLE BLOOD HOLD COAG: NORMAL
WHOLE BLOOD HOLD SPECIMEN: NORMAL

## 2022-11-23 PROCEDURE — 83605 ASSAY OF LACTIC ACID: CPT | Performed by: EMERGENCY MEDICINE

## 2022-11-23 PROCEDURE — 71045 X-RAY EXAM CHEST 1 VIEW: CPT

## 2022-11-23 PROCEDURE — 94799 UNLISTED PULMONARY SVC/PX: CPT

## 2022-11-23 PROCEDURE — 87636 SARSCOV2 & INF A&B AMP PRB: CPT | Performed by: EMERGENCY MEDICINE

## 2022-11-23 PROCEDURE — 82375 ASSAY CARBOXYHB QUANT: CPT

## 2022-11-23 PROCEDURE — 0202U NFCT DS 22 TRGT SARS-COV-2: CPT | Performed by: HOSPITALIST

## 2022-11-23 PROCEDURE — 84484 ASSAY OF TROPONIN QUANT: CPT | Performed by: EMERGENCY MEDICINE

## 2022-11-23 PROCEDURE — 99223 1ST HOSP IP/OBS HIGH 75: CPT | Performed by: HOSPITALIST

## 2022-11-23 PROCEDURE — 80053 COMPREHEN METABOLIC PANEL: CPT | Performed by: EMERGENCY MEDICINE

## 2022-11-23 PROCEDURE — 36600 WITHDRAWAL OF ARTERIAL BLOOD: CPT

## 2022-11-23 PROCEDURE — 94761 N-INVAS EAR/PLS OXIMETRY MLT: CPT

## 2022-11-23 PROCEDURE — 85025 COMPLETE CBC W/AUTO DIFF WBC: CPT | Performed by: EMERGENCY MEDICINE

## 2022-11-23 PROCEDURE — 93005 ELECTROCARDIOGRAM TRACING: CPT | Performed by: EMERGENCY MEDICINE

## 2022-11-23 PROCEDURE — 25010000002 HEPARIN (PORCINE) PER 1000 UNITS: Performed by: HOSPITALIST

## 2022-11-23 PROCEDURE — 94660 CPAP INITIATION&MGMT: CPT

## 2022-11-23 PROCEDURE — 83880 ASSAY OF NATRIURETIC PEPTIDE: CPT | Performed by: EMERGENCY MEDICINE

## 2022-11-23 PROCEDURE — 82805 BLOOD GASES W/O2 SATURATION: CPT

## 2022-11-23 PROCEDURE — 36415 COLL VENOUS BLD VENIPUNCTURE: CPT

## 2022-11-23 PROCEDURE — 83050 HGB METHEMOGLOBIN QUAN: CPT

## 2022-11-23 PROCEDURE — 84145 PROCALCITONIN (PCT): CPT | Performed by: EMERGENCY MEDICINE

## 2022-11-23 PROCEDURE — 99284 EMERGENCY DEPT VISIT MOD MDM: CPT

## 2022-11-23 PROCEDURE — 25010000002 METHYLPREDNISOLONE PER 125 MG: Performed by: EMERGENCY MEDICINE

## 2022-11-23 PROCEDURE — 94640 AIRWAY INHALATION TREATMENT: CPT

## 2022-11-23 RX ORDER — HYDROCODONE BITARTRATE AND ACETAMINOPHEN 5; 325 MG/1; MG/1
1 TABLET ORAL EVERY 6 HOURS PRN
Status: DISCONTINUED | OUTPATIENT
Start: 2022-11-23 | End: 2022-11-26 | Stop reason: HOSPADM

## 2022-11-23 RX ORDER — IPRATROPIUM BROMIDE AND ALBUTEROL SULFATE 2.5; .5 MG/3ML; MG/3ML
3 SOLUTION RESPIRATORY (INHALATION)
Status: DISCONTINUED | OUTPATIENT
Start: 2022-11-23 | End: 2022-11-26 | Stop reason: HOSPADM

## 2022-11-23 RX ORDER — DOXYCYCLINE 100 MG/1
100 CAPSULE ORAL EVERY 12 HOURS SCHEDULED
Status: DISCONTINUED | OUTPATIENT
Start: 2022-11-23 | End: 2022-11-26 | Stop reason: HOSPADM

## 2022-11-23 RX ORDER — ACETAMINOPHEN 650 MG/1
650 SUPPOSITORY RECTAL EVERY 4 HOURS PRN
Status: DISCONTINUED | OUTPATIENT
Start: 2022-11-23 | End: 2022-11-26 | Stop reason: HOSPADM

## 2022-11-23 RX ORDER — HEPARIN SODIUM 5000 [USP'U]/ML
5000 INJECTION, SOLUTION INTRAVENOUS; SUBCUTANEOUS EVERY 8 HOURS SCHEDULED
Status: DISCONTINUED | OUTPATIENT
Start: 2022-11-23 | End: 2022-11-26 | Stop reason: HOSPADM

## 2022-11-23 RX ORDER — HYDROCODONE BITARTRATE AND ACETAMINOPHEN 5; 325 MG/1; MG/1
1 TABLET ORAL EVERY 8 HOURS PRN
Status: DISCONTINUED | OUTPATIENT
Start: 2022-11-23 | End: 2022-11-23

## 2022-11-23 RX ORDER — METHYLPREDNISOLONE SODIUM SUCCINATE 125 MG/2ML
125 INJECTION, POWDER, LYOPHILIZED, FOR SOLUTION INTRAMUSCULAR; INTRAVENOUS ONCE
Status: COMPLETED | OUTPATIENT
Start: 2022-11-23 | End: 2022-11-23

## 2022-11-23 RX ORDER — BUMETANIDE 0.25 MG/ML
2 INJECTION INTRAMUSCULAR; INTRAVENOUS ONCE
Status: COMPLETED | OUTPATIENT
Start: 2022-11-23 | End: 2022-11-23

## 2022-11-23 RX ORDER — AMOXICILLIN 250 MG
2 CAPSULE ORAL 2 TIMES DAILY
Status: DISCONTINUED | OUTPATIENT
Start: 2022-11-23 | End: 2022-11-26 | Stop reason: HOSPADM

## 2022-11-23 RX ORDER — IPRATROPIUM BROMIDE AND ALBUTEROL SULFATE 2.5; .5 MG/3ML; MG/3ML
3 SOLUTION RESPIRATORY (INHALATION) ONCE
Status: COMPLETED | OUTPATIENT
Start: 2022-11-23 | End: 2022-11-23

## 2022-11-23 RX ORDER — BISACODYL 10 MG
10 SUPPOSITORY, RECTAL RECTAL DAILY PRN
Status: DISCONTINUED | OUTPATIENT
Start: 2022-11-23 | End: 2022-11-26 | Stop reason: HOSPADM

## 2022-11-23 RX ORDER — ONDANSETRON 2 MG/ML
4 INJECTION INTRAMUSCULAR; INTRAVENOUS EVERY 6 HOURS PRN
Status: DISCONTINUED | OUTPATIENT
Start: 2022-11-23 | End: 2022-11-26 | Stop reason: HOSPADM

## 2022-11-23 RX ORDER — SODIUM CHLORIDE 0.9 % (FLUSH) 0.9 %
10 SYRINGE (ML) INJECTION EVERY 12 HOURS SCHEDULED
Status: DISCONTINUED | OUTPATIENT
Start: 2022-11-23 | End: 2022-11-26 | Stop reason: HOSPADM

## 2022-11-23 RX ORDER — SODIUM CHLORIDE 0.9 % (FLUSH) 0.9 %
10 SYRINGE (ML) INJECTION AS NEEDED
Status: DISCONTINUED | OUTPATIENT
Start: 2022-11-23 | End: 2022-11-26 | Stop reason: HOSPADM

## 2022-11-23 RX ORDER — POLYETHYLENE GLYCOL 3350 17 G/17G
17 POWDER, FOR SOLUTION ORAL DAILY PRN
Status: DISCONTINUED | OUTPATIENT
Start: 2022-11-23 | End: 2022-11-26 | Stop reason: HOSPADM

## 2022-11-23 RX ORDER — METHYLPREDNISOLONE SODIUM SUCCINATE 125 MG/2ML
60 INJECTION, POWDER, LYOPHILIZED, FOR SOLUTION INTRAMUSCULAR; INTRAVENOUS EVERY 12 HOURS
Status: DISCONTINUED | OUTPATIENT
Start: 2022-11-23 | End: 2022-11-25

## 2022-11-23 RX ORDER — CHOLECALCIFEROL (VITAMIN D3) 125 MCG
5 CAPSULE ORAL NIGHTLY
Status: DISCONTINUED | OUTPATIENT
Start: 2022-11-23 | End: 2022-11-26 | Stop reason: HOSPADM

## 2022-11-23 RX ORDER — ACETAMINOPHEN 325 MG/1
650 TABLET ORAL EVERY 4 HOURS PRN
Status: DISCONTINUED | OUTPATIENT
Start: 2022-11-23 | End: 2022-11-26 | Stop reason: HOSPADM

## 2022-11-23 RX ORDER — DEXTROSE MONOHYDRATE 25 G/50ML
25 INJECTION, SOLUTION INTRAVENOUS
Status: DISCONTINUED | OUTPATIENT
Start: 2022-11-23 | End: 2022-11-26 | Stop reason: HOSPADM

## 2022-11-23 RX ORDER — IPRATROPIUM BROMIDE AND ALBUTEROL SULFATE 2.5; .5 MG/3ML; MG/3ML
3 SOLUTION RESPIRATORY (INHALATION) EVERY 4 HOURS PRN
Status: DISCONTINUED | OUTPATIENT
Start: 2022-11-23 | End: 2022-11-26 | Stop reason: HOSPADM

## 2022-11-23 RX ORDER — TRAZODONE HYDROCHLORIDE 50 MG/1
50 TABLET ORAL NIGHTLY PRN
Status: DISCONTINUED | OUTPATIENT
Start: 2022-11-23 | End: 2022-11-26 | Stop reason: HOSPADM

## 2022-11-23 RX ORDER — ONDANSETRON 4 MG/1
4 TABLET, FILM COATED ORAL EVERY 6 HOURS PRN
Status: DISCONTINUED | OUTPATIENT
Start: 2022-11-23 | End: 2022-11-26 | Stop reason: HOSPADM

## 2022-11-23 RX ORDER — ACETAMINOPHEN 160 MG/5ML
650 SOLUTION ORAL EVERY 4 HOURS PRN
Status: DISCONTINUED | OUTPATIENT
Start: 2022-11-23 | End: 2022-11-26 | Stop reason: HOSPADM

## 2022-11-23 RX ORDER — NICOTINE POLACRILEX 4 MG
15 LOZENGE BUCCAL
Status: DISCONTINUED | OUTPATIENT
Start: 2022-11-23 | End: 2022-11-26 | Stop reason: HOSPADM

## 2022-11-23 RX ORDER — SODIUM CHLORIDE 9 MG/ML
40 INJECTION, SOLUTION INTRAVENOUS AS NEEDED
Status: DISCONTINUED | OUTPATIENT
Start: 2022-11-23 | End: 2022-11-26 | Stop reason: HOSPADM

## 2022-11-23 RX ORDER — INSULIN LISPRO 100 [IU]/ML
0-7 INJECTION, SOLUTION INTRAVENOUS; SUBCUTANEOUS
Status: DISCONTINUED | OUTPATIENT
Start: 2022-11-24 | End: 2022-11-26 | Stop reason: HOSPADM

## 2022-11-23 RX ORDER — BISACODYL 5 MG/1
5 TABLET, DELAYED RELEASE ORAL DAILY PRN
Status: DISCONTINUED | OUTPATIENT
Start: 2022-11-23 | End: 2022-11-26 | Stop reason: HOSPADM

## 2022-11-23 RX ADMIN — IPRATROPIUM BROMIDE AND ALBUTEROL SULFATE 3 ML: .5; 3 SOLUTION RESPIRATORY (INHALATION) at 21:11

## 2022-11-23 RX ADMIN — METHYLPREDNISOLONE SODIUM SUCCINATE 125 MG: 125 INJECTION, POWDER, FOR SOLUTION INTRAMUSCULAR; INTRAVENOUS at 19:06

## 2022-11-23 RX ADMIN — HYDROCODONE BITARTRATE AND ACETAMINOPHEN 1 TABLET: 5; 325 TABLET ORAL at 21:28

## 2022-11-23 RX ADMIN — Medication 10 ML: at 21:05

## 2022-11-23 RX ADMIN — HEPARIN SODIUM 5000 UNITS: 5000 INJECTION INTRAVENOUS; SUBCUTANEOUS at 21:04

## 2022-11-23 RX ADMIN — SENNOSIDES AND DOCUSATE SODIUM 1 TABLET: 50; 8.6 TABLET ORAL at 21:04

## 2022-11-23 RX ADMIN — Medication 5 MG: at 21:28

## 2022-11-23 RX ADMIN — IPRATROPIUM BROMIDE AND ALBUTEROL SULFATE 3 ML: .5; 2.5 SOLUTION RESPIRATORY (INHALATION) at 17:10

## 2022-11-23 RX ADMIN — BUMETANIDE 2 MG: 0.25 INJECTION INTRAMUSCULAR; INTRAVENOUS at 18:00

## 2022-11-23 RX ADMIN — DOXYCYCLINE 100 MG: 100 CAPSULE ORAL at 21:04

## 2022-11-23 NOTE — OUTREACH NOTE
COPD/PN Week 2 Survey    Flowsheet Row Responses   [facility] facility patient discharged from? [place]   Does the patient have one of the following disease processes/diagnoses(primary or secondary)? COPD   Week 2 attempt successful? No   Unsuccessful attempts Attempt 2  [All numbers listed were attempted-no answer]          SLAVA H - Registered Nurse

## 2022-11-24 ENCOUNTER — APPOINTMENT (OUTPATIENT)
Dept: GENERAL RADIOLOGY | Facility: HOSPITAL | Age: 57
End: 2022-11-24

## 2022-11-24 LAB
ALBUMIN SERPL-MCNC: 3.8 G/DL (ref 3.5–5.2)
ALBUMIN/GLOB SERPL: 1.3 G/DL
ALP SERPL-CCNC: 105 U/L (ref 39–117)
ALT SERPL W P-5'-P-CCNC: 29 U/L (ref 1–33)
ANION GAP SERPL CALCULATED.3IONS-SCNC: 7 MMOL/L (ref 5–15)
AST SERPL-CCNC: 21 U/L (ref 1–32)
BILIRUB SERPL-MCNC: 0.2 MG/DL (ref 0–1.2)
BUN SERPL-MCNC: 18 MG/DL (ref 6–20)
BUN/CREAT SERPL: 38.3 (ref 7–25)
CALCIUM SPEC-SCNC: 9.5 MG/DL (ref 8.6–10.5)
CHLORIDE SERPL-SCNC: 93 MMOL/L (ref 98–107)
CO2 SERPL-SCNC: 35 MMOL/L (ref 22–29)
CREAT SERPL-MCNC: 0.47 MG/DL (ref 0.57–1)
DEPRECATED RDW RBC AUTO: 50.5 FL (ref 37–54)
EGFRCR SERPLBLD CKD-EPI 2021: 109.8 ML/MIN/1.73
ERYTHROCYTE [DISTWIDTH] IN BLOOD BY AUTOMATED COUNT: 15 % (ref 12.3–15.4)
GLOBULIN UR ELPH-MCNC: 2.9 GM/DL
GLUCOSE BLDC GLUCOMTR-MCNC: 138 MG/DL (ref 70–130)
GLUCOSE BLDC GLUCOMTR-MCNC: 140 MG/DL (ref 70–130)
GLUCOSE BLDC GLUCOMTR-MCNC: 276 MG/DL (ref 70–130)
GLUCOSE SERPL-MCNC: 295 MG/DL (ref 65–99)
HCT VFR BLD AUTO: 40.7 % (ref 34–46.6)
HGB BLD-MCNC: 11.6 G/DL (ref 12–15.9)
MCH RBC QN AUTO: 26.2 PG (ref 26.6–33)
MCHC RBC AUTO-ENTMCNC: 28.5 G/DL (ref 31.5–35.7)
MCV RBC AUTO: 91.9 FL (ref 79–97)
PLATELET # BLD AUTO: 298 10*3/MM3 (ref 140–450)
PMV BLD AUTO: 10.4 FL (ref 6–12)
POTASSIUM SERPL-SCNC: 4.9 MMOL/L (ref 3.5–5.2)
PROT SERPL-MCNC: 6.7 G/DL (ref 6–8.5)
RBC # BLD AUTO: 4.43 10*6/MM3 (ref 3.77–5.28)
SODIUM SERPL-SCNC: 135 MMOL/L (ref 136–145)
WBC NRBC COR # BLD: 12.2 10*3/MM3 (ref 3.4–10.8)

## 2022-11-24 PROCEDURE — 82962 GLUCOSE BLOOD TEST: CPT

## 2022-11-24 PROCEDURE — 71045 X-RAY EXAM CHEST 1 VIEW: CPT

## 2022-11-24 PROCEDURE — 94660 CPAP INITIATION&MGMT: CPT

## 2022-11-24 PROCEDURE — 94799 UNLISTED PULMONARY SVC/PX: CPT

## 2022-11-24 PROCEDURE — 94761 N-INVAS EAR/PLS OXIMETRY MLT: CPT

## 2022-11-24 PROCEDURE — 25010000002 METHYLPREDNISOLONE PER 125 MG: Performed by: HOSPITALIST

## 2022-11-24 PROCEDURE — 94664 DEMO&/EVAL PT USE INHALER: CPT

## 2022-11-24 PROCEDURE — 63710000001 INSULIN LISPRO (HUMAN) PER 5 UNITS: Performed by: HOSPITALIST

## 2022-11-24 PROCEDURE — 85027 COMPLETE CBC AUTOMATED: CPT | Performed by: HOSPITALIST

## 2022-11-24 PROCEDURE — 25010000002 HEPARIN (PORCINE) PER 1000 UNITS: Performed by: HOSPITALIST

## 2022-11-24 PROCEDURE — 80053 COMPREHEN METABOLIC PANEL: CPT | Performed by: HOSPITALIST

## 2022-11-24 PROCEDURE — 97161 PT EVAL LOW COMPLEX 20 MIN: CPT | Performed by: PHYSICAL THERAPIST

## 2022-11-24 PROCEDURE — 99232 SBSQ HOSP IP/OBS MODERATE 35: CPT | Performed by: INTERNAL MEDICINE

## 2022-11-24 RX ORDER — FENOFIBRATE 145 MG/1
145 TABLET, COATED ORAL DAILY
Status: DISCONTINUED | OUTPATIENT
Start: 2022-11-24 | End: 2022-11-26 | Stop reason: HOSPADM

## 2022-11-24 RX ORDER — NICOTINE 21 MG/24HR
1 PATCH, TRANSDERMAL 24 HOURS TRANSDERMAL
Status: DISCONTINUED | OUTPATIENT
Start: 2022-11-24 | End: 2022-11-26 | Stop reason: HOSPADM

## 2022-11-24 RX ADMIN — IPRATROPIUM BROMIDE AND ALBUTEROL SULFATE 3 ML: .5; 3 SOLUTION RESPIRATORY (INHALATION) at 02:36

## 2022-11-24 RX ADMIN — IPRATROPIUM BROMIDE AND ALBUTEROL SULFATE 3 ML: .5; 3 SOLUTION RESPIRATORY (INHALATION) at 23:29

## 2022-11-24 RX ADMIN — HYDROCODONE BITARTRATE AND ACETAMINOPHEN 1 TABLET: 5; 325 TABLET ORAL at 15:32

## 2022-11-24 RX ADMIN — HYDROCODONE BITARTRATE AND ACETAMINOPHEN 1 TABLET: 5; 325 TABLET ORAL at 22:35

## 2022-11-24 RX ADMIN — SENNOSIDES AND DOCUSATE SODIUM 2 TABLET: 50; 8.6 TABLET ORAL at 08:39

## 2022-11-24 RX ADMIN — HEPARIN SODIUM 5000 UNITS: 5000 INJECTION INTRAVENOUS; SUBCUTANEOUS at 05:11

## 2022-11-24 RX ADMIN — METHYLPREDNISOLONE SODIUM SUCCINATE 60 MG: 125 INJECTION, POWDER, FOR SOLUTION INTRAMUSCULAR; INTRAVENOUS at 22:34

## 2022-11-24 RX ADMIN — DOXYCYCLINE 100 MG: 100 CAPSULE ORAL at 22:34

## 2022-11-24 RX ADMIN — HEPARIN SODIUM 5000 UNITS: 5000 INJECTION INTRAVENOUS; SUBCUTANEOUS at 22:34

## 2022-11-24 RX ADMIN — IPRATROPIUM BROMIDE AND ALBUTEROL SULFATE 3 ML: .5; 3 SOLUTION RESPIRATORY (INHALATION) at 19:48

## 2022-11-24 RX ADMIN — IPRATROPIUM BROMIDE AND ALBUTEROL SULFATE 3 ML: .5; 3 SOLUTION RESPIRATORY (INHALATION) at 12:15

## 2022-11-24 RX ADMIN — INSULIN LISPRO 4 UNITS: 100 INJECTION, SOLUTION INTRAVENOUS; SUBCUTANEOUS at 17:19

## 2022-11-24 RX ADMIN — Medication 1 PATCH: at 08:40

## 2022-11-24 RX ADMIN — METHYLPREDNISOLONE SODIUM SUCCINATE 60 MG: 125 INJECTION, POWDER, FOR SOLUTION INTRAMUSCULAR; INTRAVENOUS at 08:39

## 2022-11-24 RX ADMIN — SENNOSIDES AND DOCUSATE SODIUM 2 TABLET: 50; 8.6 TABLET ORAL at 22:35

## 2022-11-24 RX ADMIN — DOXYCYCLINE 100 MG: 100 CAPSULE ORAL at 08:40

## 2022-11-24 RX ADMIN — HYDROCODONE BITARTRATE AND ACETAMINOPHEN 1 TABLET: 5; 325 TABLET ORAL at 08:49

## 2022-11-24 RX ADMIN — Medication 5 MG: at 22:35

## 2022-11-24 RX ADMIN — Medication 10 ML: at 22:35

## 2022-11-24 RX ADMIN — HEPARIN SODIUM 5000 UNITS: 5000 INJECTION INTRAVENOUS; SUBCUTANEOUS at 14:02

## 2022-11-24 RX ADMIN — TRAZODONE HYDROCHLORIDE 50 MG: 50 TABLET ORAL at 22:35

## 2022-11-24 RX ADMIN — FENOFIBRATE 145 MG: 145 TABLET ORAL at 08:43

## 2022-11-24 RX ADMIN — Medication 10 ML: at 08:40

## 2022-11-24 NOTE — OUTREACH NOTE
COPD/PN Week 3 Survey    Flowsheet Row Responses   [facility] facility patient discharged from? [place]   Does the patient have one of the following disease processes/diagnoses(primary or secondary)? COPD   Week 3 attempt successful? No   Unsuccessful attempts Attempt 1   Revoke Readmitted          DOIGO CASAS - Registered Nurse

## 2022-11-25 LAB
ANION GAP SERPL CALCULATED.3IONS-SCNC: 8 MMOL/L (ref 5–15)
BASOPHILS # BLD AUTO: 0.03 10*3/MM3 (ref 0–0.2)
BASOPHILS NFR BLD AUTO: 0.3 % (ref 0–1.5)
BUN SERPL-MCNC: 26 MG/DL (ref 6–20)
BUN/CREAT SERPL: 46.4 (ref 7–25)
CALCIUM SPEC-SCNC: 9.2 MG/DL (ref 8.6–10.5)
CHLORIDE SERPL-SCNC: 96 MMOL/L (ref 98–107)
CO2 SERPL-SCNC: 33 MMOL/L (ref 22–29)
CREAT SERPL-MCNC: 0.56 MG/DL (ref 0.57–1)
DEPRECATED RDW RBC AUTO: 51.3 FL (ref 37–54)
EGFRCR SERPLBLD CKD-EPI 2021: 105.3 ML/MIN/1.73
EOSINOPHIL # BLD AUTO: 0 10*3/MM3 (ref 0–0.4)
EOSINOPHIL NFR BLD AUTO: 0 % (ref 0.3–6.2)
ERYTHROCYTE [DISTWIDTH] IN BLOOD BY AUTOMATED COUNT: 15.3 % (ref 12.3–15.4)
GLUCOSE BLDC GLUCOMTR-MCNC: 195 MG/DL (ref 70–130)
GLUCOSE BLDC GLUCOMTR-MCNC: 208 MG/DL (ref 70–130)
GLUCOSE BLDC GLUCOMTR-MCNC: 213 MG/DL (ref 70–130)
GLUCOSE BLDC GLUCOMTR-MCNC: 230 MG/DL (ref 70–130)
GLUCOSE BLDC GLUCOMTR-MCNC: 252 MG/DL (ref 70–130)
GLUCOSE BLDC GLUCOMTR-MCNC: 272 MG/DL (ref 70–130)
GLUCOSE SERPL-MCNC: 256 MG/DL (ref 65–99)
HCT VFR BLD AUTO: 41.8 % (ref 34–46.6)
HGB BLD-MCNC: 12.1 G/DL (ref 12–15.9)
IMM GRANULOCYTES # BLD AUTO: 0.09 10*3/MM3 (ref 0–0.05)
IMM GRANULOCYTES NFR BLD AUTO: 0.8 % (ref 0–0.5)
LYMPHOCYTES # BLD AUTO: 0.64 10*3/MM3 (ref 0.7–3.1)
LYMPHOCYTES NFR BLD AUTO: 5.6 % (ref 19.6–45.3)
MCH RBC QN AUTO: 26.3 PG (ref 26.6–33)
MCHC RBC AUTO-ENTMCNC: 28.9 G/DL (ref 31.5–35.7)
MCV RBC AUTO: 90.9 FL (ref 79–97)
MONOCYTES # BLD AUTO: 0.16 10*3/MM3 (ref 0.1–0.9)
MONOCYTES NFR BLD AUTO: 1.4 % (ref 5–12)
NEUTROPHILS NFR BLD AUTO: 10.41 10*3/MM3 (ref 1.7–7)
NEUTROPHILS NFR BLD AUTO: 91.9 % (ref 42.7–76)
NRBC BLD AUTO-RTO: 0 /100 WBC (ref 0–0.2)
PLATELET # BLD AUTO: 269 10*3/MM3 (ref 140–450)
PMV BLD AUTO: 10.4 FL (ref 6–12)
POTASSIUM SERPL-SCNC: 5.1 MMOL/L (ref 3.5–5.2)
QT INTERVAL: 358 MS
QTC INTERVAL: 408 MS
RBC # BLD AUTO: 4.6 10*6/MM3 (ref 3.77–5.28)
SODIUM SERPL-SCNC: 137 MMOL/L (ref 136–145)
WBC NRBC COR # BLD: 11.33 10*3/MM3 (ref 3.4–10.8)

## 2022-11-25 PROCEDURE — 25010000002 METHYLPREDNISOLONE PER 125 MG: Performed by: HOSPITALIST

## 2022-11-25 PROCEDURE — 99232 SBSQ HOSP IP/OBS MODERATE 35: CPT | Performed by: INTERNAL MEDICINE

## 2022-11-25 PROCEDURE — 25010000002 HEPARIN (PORCINE) PER 1000 UNITS: Performed by: HOSPITALIST

## 2022-11-25 PROCEDURE — 80048 BASIC METABOLIC PNL TOTAL CA: CPT | Performed by: INTERNAL MEDICINE

## 2022-11-25 PROCEDURE — 94799 UNLISTED PULMONARY SVC/PX: CPT

## 2022-11-25 PROCEDURE — 29580 STRAPPING UNNA BOOT: CPT

## 2022-11-25 PROCEDURE — 63710000001 INSULIN LISPRO (HUMAN) PER 5 UNITS: Performed by: HOSPITALIST

## 2022-11-25 PROCEDURE — 97164 PT RE-EVAL EST PLAN CARE: CPT

## 2022-11-25 PROCEDURE — 82962 GLUCOSE BLOOD TEST: CPT

## 2022-11-25 PROCEDURE — 63710000001 INSULIN DETEMIR PER 5 UNITS: Performed by: INTERNAL MEDICINE

## 2022-11-25 PROCEDURE — 85025 COMPLETE CBC W/AUTO DIFF WBC: CPT | Performed by: INTERNAL MEDICINE

## 2022-11-25 RX ORDER — PREDNISONE 20 MG/1
40 TABLET ORAL
Status: DISCONTINUED | OUTPATIENT
Start: 2022-11-26 | End: 2022-11-26 | Stop reason: HOSPADM

## 2022-11-25 RX ADMIN — IPRATROPIUM BROMIDE AND ALBUTEROL SULFATE 3 ML: .5; 3 SOLUTION RESPIRATORY (INHALATION) at 18:42

## 2022-11-25 RX ADMIN — IPRATROPIUM BROMIDE AND ALBUTEROL SULFATE 3 ML: .5; 3 SOLUTION RESPIRATORY (INHALATION) at 08:33

## 2022-11-25 RX ADMIN — Medication 1 PATCH: at 08:56

## 2022-11-25 RX ADMIN — FENOFIBRATE 145 MG: 145 TABLET ORAL at 08:57

## 2022-11-25 RX ADMIN — HYDROCODONE BITARTRATE AND ACETAMINOPHEN 1 TABLET: 5; 325 TABLET ORAL at 08:57

## 2022-11-25 RX ADMIN — INSULIN LISPRO 3 UNITS: 100 INJECTION, SOLUTION INTRAVENOUS; SUBCUTANEOUS at 16:45

## 2022-11-25 RX ADMIN — INSULIN LISPRO 4 UNITS: 100 INJECTION, SOLUTION INTRAVENOUS; SUBCUTANEOUS at 14:28

## 2022-11-25 RX ADMIN — TRAZODONE HYDROCHLORIDE 50 MG: 50 TABLET ORAL at 22:24

## 2022-11-25 RX ADMIN — INSULIN LISPRO 4 UNITS: 100 INJECTION, SOLUTION INTRAVENOUS; SUBCUTANEOUS at 08:59

## 2022-11-25 RX ADMIN — METHYLPREDNISOLONE SODIUM SUCCINATE 60 MG: 125 INJECTION, POWDER, FOR SOLUTION INTRAMUSCULAR; INTRAVENOUS at 06:29

## 2022-11-25 RX ADMIN — INSULIN DETEMIR 10 UNITS: 100 INJECTION, SOLUTION SUBCUTANEOUS at 22:28

## 2022-11-25 RX ADMIN — Medication 5 MG: at 22:25

## 2022-11-25 RX ADMIN — DOXYCYCLINE 100 MG: 100 CAPSULE ORAL at 22:25

## 2022-11-25 RX ADMIN — HYDROCODONE BITARTRATE AND ACETAMINOPHEN 1 TABLET: 5; 325 TABLET ORAL at 22:24

## 2022-11-25 RX ADMIN — HYDROCODONE BITARTRATE AND ACETAMINOPHEN 1 TABLET: 5; 325 TABLET ORAL at 16:45

## 2022-11-25 RX ADMIN — HEPARIN SODIUM 5000 UNITS: 5000 INJECTION INTRAVENOUS; SUBCUTANEOUS at 14:28

## 2022-11-25 RX ADMIN — SENNOSIDES AND DOCUSATE SODIUM 2 TABLET: 50; 8.6 TABLET ORAL at 08:57

## 2022-11-25 RX ADMIN — SENNOSIDES AND DOCUSATE SODIUM 2 TABLET: 50; 8.6 TABLET ORAL at 22:24

## 2022-11-25 RX ADMIN — IPRATROPIUM BROMIDE AND ALBUTEROL SULFATE 3 ML: .5; 3 SOLUTION RESPIRATORY (INHALATION) at 13:11

## 2022-11-25 RX ADMIN — DOXYCYCLINE 100 MG: 100 CAPSULE ORAL at 08:57

## 2022-11-25 RX ADMIN — HEPARIN SODIUM 5000 UNITS: 5000 INJECTION INTRAVENOUS; SUBCUTANEOUS at 22:24

## 2022-11-25 RX ADMIN — HEPARIN SODIUM 5000 UNITS: 5000 INJECTION INTRAVENOUS; SUBCUTANEOUS at 06:26

## 2022-11-26 ENCOUNTER — READMISSION MANAGEMENT (OUTPATIENT)
Dept: CALL CENTER | Facility: HOSPITAL | Age: 57
End: 2022-11-26

## 2022-11-26 VITALS
SYSTOLIC BLOOD PRESSURE: 130 MMHG | TEMPERATURE: 97.6 F | WEIGHT: 275 LBS | RESPIRATION RATE: 22 BRPM | OXYGEN SATURATION: 93 % | DIASTOLIC BLOOD PRESSURE: 81 MMHG | BODY MASS INDEX: 41.68 KG/M2 | HEART RATE: 82 BPM | HEIGHT: 68 IN

## 2022-11-26 LAB — GLUCOSE BLDC GLUCOMTR-MCNC: 139 MG/DL (ref 70–130)

## 2022-11-26 PROCEDURE — 82962 GLUCOSE BLOOD TEST: CPT

## 2022-11-26 PROCEDURE — 94799 UNLISTED PULMONARY SVC/PX: CPT

## 2022-11-26 PROCEDURE — 99239 HOSP IP/OBS DSCHRG MGMT >30: CPT | Performed by: NURSE PRACTITIONER

## 2022-11-26 PROCEDURE — 63710000001 PREDNISONE PER 1 MG: Performed by: INTERNAL MEDICINE

## 2022-11-26 PROCEDURE — 25010000002 HEPARIN (PORCINE) PER 1000 UNITS: Performed by: HOSPITALIST

## 2022-11-26 RX ORDER — DOXYCYCLINE 100 MG/1
100 TABLET ORAL EVERY 12 HOURS SCHEDULED
Qty: 5 TABLET | Refills: 0 | Status: SHIPPED | OUTPATIENT
Start: 2022-11-26 | End: 2022-11-29

## 2022-11-26 RX ORDER — PREDNISONE 20 MG/1
40 TABLET ORAL DAILY
Qty: 6 TABLET | Refills: 0 | Status: SHIPPED | OUTPATIENT
Start: 2022-11-27 | End: 2022-11-26 | Stop reason: SDUPTHER

## 2022-11-26 RX ORDER — PREDNISONE 20 MG/1
40 TABLET ORAL DAILY
Qty: 6 TABLET | Refills: 0 | Status: SHIPPED | OUTPATIENT
Start: 2022-11-27

## 2022-11-26 RX ORDER — DOXYCYCLINE 100 MG/1
100 CAPSULE ORAL EVERY 12 HOURS SCHEDULED
Qty: 5 CAPSULE | Refills: 0 | Status: SHIPPED | OUTPATIENT
Start: 2022-11-26 | End: 2022-11-26 | Stop reason: SDUPTHER

## 2022-11-26 RX ADMIN — IPRATROPIUM BROMIDE AND ALBUTEROL SULFATE 3 ML: .5; 3 SOLUTION RESPIRATORY (INHALATION) at 07:22

## 2022-11-26 RX ADMIN — Medication 1 PATCH: at 09:25

## 2022-11-26 RX ADMIN — DOXYCYCLINE 100 MG: 100 CAPSULE ORAL at 09:24

## 2022-11-26 RX ADMIN — HEPARIN SODIUM 5000 UNITS: 5000 INJECTION INTRAVENOUS; SUBCUTANEOUS at 09:25

## 2022-11-26 RX ADMIN — FENOFIBRATE 145 MG: 145 TABLET ORAL at 09:33

## 2022-11-26 RX ADMIN — PREDNISONE 40 MG: 20 TABLET ORAL at 09:25

## 2022-11-27 NOTE — OUTREACH NOTE
Prep Survey    Flowsheet Row Responses   [facility] facility patient discharged from? [place]   Is LACE score < 7 ? No   Emergency Room discharge w/ pulse ox? No   Eligibility Readm Mgmt   Discharge diagnosis COPD with exac   Does the patient have one of the following disease processes/diagnoses(primary or secondary)? COPD   Does the patient have Home health ordered? No   Is there a DME ordered? No   Prep survey completed? Yes          OSVALDO ARANGO - Registered Nurse

## 2022-11-28 ENCOUNTER — OFFICE VISIT (OUTPATIENT)
Dept: PULMONOLOGY | Facility: CLINIC | Age: 57
End: 2022-11-28

## 2022-11-28 VITALS
TEMPERATURE: 98.5 F | WEIGHT: 271.2 LBS | DIASTOLIC BLOOD PRESSURE: 88 MMHG | SYSTOLIC BLOOD PRESSURE: 158 MMHG | HEIGHT: 68 IN | OXYGEN SATURATION: 92 % | BODY MASS INDEX: 41.1 KG/M2 | HEART RATE: 102 BPM

## 2022-11-28 DIAGNOSIS — J41.1 MUCOPURULENT CHRONIC BRONCHITIS: ICD-10-CM

## 2022-11-28 DIAGNOSIS — J96.11 CHRONIC RESPIRATORY FAILURE WITH HYPOXIA: ICD-10-CM

## 2022-11-28 DIAGNOSIS — J44.9 CHRONIC OBSTRUCTIVE PULMONARY DISEASE, UNSPECIFIED COPD TYPE: Primary | ICD-10-CM

## 2022-11-28 DIAGNOSIS — Z72.0 TOBACCO ABUSE: ICD-10-CM

## 2022-11-28 PROBLEM — J96.20 ACUTE-ON-CHRONIC RESPIRATORY FAILURE: Status: RESOLVED | Noted: 2022-11-23 | Resolved: 2022-11-28

## 2022-11-28 PROBLEM — J44.1 COPD WITH EXACERBATION (HCC): Status: RESOLVED | Noted: 2022-11-23 | Resolved: 2022-11-28

## 2022-11-28 PROCEDURE — 99214 OFFICE O/P EST MOD 30 MIN: CPT | Performed by: NURSE PRACTITIONER

## 2022-11-28 PROCEDURE — 94010 BREATHING CAPACITY TEST: CPT | Performed by: NURSE PRACTITIONER

## 2022-11-28 RX ORDER — NALOXONE HYDROCHLORIDE 4 MG/.1ML
SPRAY NASAL
COMMUNITY
Start: 2022-10-21 | End: 2022-11-28

## 2022-11-28 RX ORDER — LISINOPRIL 20 MG/1
TABLET ORAL
COMMUNITY
Start: 2022-11-21

## 2022-11-28 NOTE — PROGRESS NOTES
"[facility] Pulmonary Follow up      Chief Complaint  COPD    Subjective          Jovanna Ching presents to [facility] for posthospitalization follow-up.  She was in the hospital November 23 to the 26 with a COPD exacerbation and acute on chronic respiratory failure.  She is completing a course of doxycycline and prednisone taper, she received Solu-Medrol while in the hospital.  She was also hospitalized October 28 through October 31 again with a COPD exacerbation and acute on chronic respiratory failure.    I last saw her in March of this year for a follow-up CT scan.  She does have a history of COPD with chronic bronchitis and ongoing tobacco use.  She has chronic respiratory failure and started on oxygen after hospital stay in November of last year.      During her hospital stays she has had hypercarbic and hypoxic respiratory failure.  She does use her oxygen at home 3 L pulsed dose and 2 to have 3 L continuous flow.  She states she is having trouble sleeping.  She is waking up frequently throughout the night and having trouble going back to sleep.  And then during the day she is having episodes of hypersomnolence where she will fall asleep easily if she sits down on the couch, especially in the mornings.    She is using her Anoro daily.  She uses her albuterol nebulizers 4-5 times a day.  She denies changes in her chronic cough or sputum production currently.        Objective     Vital Signs:   /88 (BP Location: Left arm, Patient Position: Sitting, Cuff Size: Adult)   Pulse 102   Temp 98.5 °F (36.9 °C) (Infrared)   Ht 172.7 cm (68\")   Wt 123 kg (271 lb 3.2 oz)   SpO2 92% Comment: 3 liters at rest  BMI 41.24 kg/m²       Immunization History   Administered Date(s) Administered   • Flu Vaccine Quad PF >36MO 11/16/2020   • FluLaval/Fluzone >6mos 11/16/2020, 10/26/2021, 10/31/2022   • Pneumococcal Polysaccharide (PPSV23) 10/26/2021   • Tdap 10/26/2021 "       Physical Exam  Vitals reviewed.   Constitutional:       General: She is not in acute distress.     Appearance: She is well-developed. She is obese. She is not ill-appearing.   HENT:      Head: Normocephalic and atraumatic.   Eyes:      Pupils: Pupils are equal, round, and reactive to light.   Cardiovascular:      Rate and Rhythm: Normal rate and regular rhythm.      Heart sounds: No murmur heard.  Pulmonary:      Effort: Pulmonary effort is normal. No respiratory distress.      Breath sounds: Wheezing and rales present.   Abdominal:      General: Bowel sounds are normal. There is no distension.      Palpations: Abdomen is soft.   Musculoskeletal:         General: Normal range of motion.      Cervical back: Normal range of motion and neck supple.      Right lower leg: Edema present.      Left lower leg: Edema present.   Skin:     General: Skin is warm and dry.      Findings: No erythema.   Neurological:      Mental Status: She is alert and oriented to person, place, and time.   Psychiatric:         Behavior: Behavior normal.          Result Review :     CBC w/diff    CBC w/Diff 11/23/22 11/24/22 11/25/22   WBC 11.67 (A) 12.20 (A) 11.33 (A)   RBC 4.43 4.43 4.60   Hemoglobin 11.8 (A) 11.6 (A) 12.1   Hematocrit 41.1 40.7 41.8   MCV 92.8 91.9 90.9   MCH 26.6 26.2 (A) 26.3 (A)   MCHC 28.7 (A) 28.5 (A) 28.9 (A)   RDW 15.6 (A) 15.0 15.3   Platelets 306 298 269   Neutrophil Rel % 75.0  91.9 (A)   Immature Granulocyte Rel % 0.9 (A)  0.8 (A)   Lymphocyte Rel % 14.5 (A)  5.6 (A)   Monocyte Rel % 8.5  1.4 (A)   Eosinophil Rel % 0.8  0.0 (A)   Basophil Rel % 0.3  0.3   (A) Abnormal value            pH, Arterial   7.350 - 7.450 pH units 7.312 Low   7.312 Low  CM  7.403  7.317 Low  CM  7.321 Low  CM  7.321 Low   7.287 Low  CM    Comment: 84 Value below reference range   pCO2, Arterial   35.0 - 45.0 mm Hg 71.9 High Critical   71.3 High Critical  CM  65.0 High  CM  82.6 High Critical  CM  78.0 High Critical  CM  82.1 High  Critical   81.5 High Critical  CM    Comment: 86 Value above critical limit   pO2, Arterial   83.0 - 108.0 mm Hg 74.5 Low   62.5 Low  CM  65.0 Low  CM  64.5                 CTA 10/28/2022  FINDINGS:     The central tracheobronchial tree is clear. There is mild emphysema.  There is no focal consolidation. There is no pleural effusion.     The heart size appears normal. The great vessels are normal in caliber.  There is no evidence of pulmonary embolus. No abnormally enlarged lymph  nodes are identified. The right thyroid gland appears enlarged, which is  nonspecific.     Partial evaluation of the upper abdomen is unremarkable.     No aggressive osseous lesions are identified.     IMPRESSION:  1.  Negative for pulmonary embolus.  2.  No acute cardiopulmonary process.  3.  Mild emphysema.        CXR 11/24/2022  FINDINGS:   Diffuse interstitial changes are again seen bilaterally. There may be  mild atelectasis versus infiltrate in the left lung base. The cardiac  silhouette and mediastinum are stable.      IMPRESSION:  Diffuse interstitial changes are again seen throughout the lungs  bilaterally. There may be mild increase in atelectasis versus infiltrate  in the left lung base.     This report was finalized on 11/24/2022 9:13 AM by Usman Luciano MD.      CXR 11/23/2022  IMPRESSION:  Increased diffuse interstitial markings/interstitial opacities from  prior exam which may reflect atypical/viral infection or interstitial  pulmonary edema.      PFTs in the office showed an FEV1 of 1.03, 40% predicted she did have some difficulty with testing and hypoxemia.               Assessment and Plan    Problem List Items Addressed This Visit        Pulmonary and Pneumonias    Mucopurulent chronic bronchitis (HCC)    Chronic respiratory failure with hypoxia (on 3 L nasal cannula)    COPD (chronic obstructive pulmonary disease) (HCC) - Primary       Tobacco    Tobacco abuse       Currently she is on Anoro and takes a daily, she does  feel like it is beneficial but is having quite a bit of wheezing, chest tightness and congestion.  Like to give her a trial of Trelegy to see if it helps with her symptoms.    Continue on her nebulizers as needed.    Continue on her oxygen, I will reach out to her DME to see if we get that humidified since she is having some nasal dryness in the evenings.    She does have chronic respiratory failure leading to multiple hospitalizations and office visits.  She requires noninvasive ventilation for her respiratory failure, BiPAP/CPAP has been ruled out due to the need for adjustable pressure support provided by NIV.  Ordering Trilogy for nocturnal and daytime usage.     1.  FEV1 less than or equal to 50% of predicted - FEV1 1.09 40% today   2.  ABG PaCO2 > 52mm Hg    follow up in 4 weeks, hopefully with the addition of the trilogy we can keep her out of the hospital.      Follow Up     Return in about 4 weeks (around 12/26/2022).  Patient was given instructions and counseling regarding her condition or for health maintenance advice. Please see specific information pulled into the AVS if appropriate.     I spent 35 minutes caring for Jovanna on this date of service. This time includes time spent by me in the following activities:preparing for the visit, reviewing tests, obtaining and/or reviewing a separately obtained history, performing a medically appropriate examination and/or evaluation , counseling and educating the patient/family/caregiver, ordering medications, tests, or procedures, referring and communicating with other health care professionals , documenting information in the medical record and independently interpreting results and communicating that information with the patient/family/caregiver    GUILLERMO Coughlin, ACNP  [facility] Critical Care Medicine  Electronically signed

## 2022-11-29 ENCOUNTER — READMISSION MANAGEMENT (OUTPATIENT)
Dept: CALL CENTER | Facility: HOSPITAL | Age: 57
End: 2022-11-29

## 2022-11-29 NOTE — OUTREACH NOTE
"COPD/PN Week 1 Survey    Flowsheet Row Responses   [facility] patient discharged from? [place]   Does the patient have one of the following disease processes/diagnoses(primary or secondary)? COPD   Week 1 attempt successful? No   Unsuccessful attempts Attempt 1          PEDRITO GERONIMO - Licensed Nurse     Returned pt's call.  States she is doing \"ok\".  She is using CPAP and has oxygen.  She does not have pulse ox to check oxygen level.  She will see if her insurance company covers this.  She also had questions if her CPAP is working correctly and advised to contact the company regarding this.    "

## 2022-12-05 ENCOUNTER — READMISSION MANAGEMENT (OUTPATIENT)
Dept: CALL CENTER | Facility: HOSPITAL | Age: 57
End: 2022-12-05

## 2022-12-05 NOTE — OUTREACH NOTE
COPD/PN Week 1 Survey    Flowsheet Row Responses   [facility] facility patient discharged from? [place]   Does the patient have one of the following disease processes/diagnoses(primary or secondary)? COPD   Week 1 attempt successful? No   Unsuccessful attempts Attempt 2          RAOUL HYDE - Registered Nurse

## 2022-12-09 ENCOUNTER — READMISSION MANAGEMENT (OUTPATIENT)
Dept: CALL CENTER | Facility: HOSPITAL | Age: 57
End: 2022-12-09

## 2022-12-09 NOTE — OUTREACH NOTE
COPD/PN Week 1 Survey    Flowsheet Row Responses   [facility] patient discharged from? [place]   Does the patient have one of the following disease processes/diagnoses(primary or secondary)? COPD   Week 1 attempt successful? Yes   Call start time 1250   Call end time 1255   Is patient permission given to speak with other caregiver? Yes   Meds reviewed with patient/caregiver? Yes   Is the patient having any side effects they believe may be caused by any medication additions or changes? No   Does the patient have all medications ordered at discharge? Yes   Is the patient taking all medications as directed (includes completed medication regime)? Yes   Comments regarding appointments Follow up appointments have been scheduled.   Has the patient kept scheduled appointments due by today? N/A   Has home health visited the patient within 72 hours of discharge? Yes   Pulse Ox monitoring Intermittent   Psychosocial issues? No   Did the patient receive a copy of their discharge instructions? Yes   Nursing interventions Reviewed instructions with patient   What is the patient's perception of their health status since discharge? Improving   Nursing Interventions Nurse provided patient education   Patient reports what zone on this call? Green Zone   Green Zone Reports doing well, Breathing without shortness of breath   Green Zone interventions: Take daily medications, Use oxygen as prescribed   Week 1 call completed? Yes   Wrap up additional comments Pt reports improving. Pt uses 02 @ 2.5 L continuous. Pt has all appointments scheduled. HH continues to visit.          KASEY CASAS - Registered Nurse

## 2024-11-19 ENCOUNTER — OFFICE VISIT (OUTPATIENT)
Dept: PULMONOLOGY | Facility: CLINIC | Age: 59
End: 2024-11-19
Payer: MEDICARE

## 2024-11-19 VITALS
WEIGHT: 220 LBS | BODY MASS INDEX: 33.45 KG/M2 | HEART RATE: 75 BPM | OXYGEN SATURATION: 97 % | TEMPERATURE: 97.3 F | DIASTOLIC BLOOD PRESSURE: 90 MMHG | SYSTOLIC BLOOD PRESSURE: 140 MMHG

## 2024-11-19 DIAGNOSIS — J44.9 CHRONIC OBSTRUCTIVE PULMONARY DISEASE, UNSPECIFIED COPD TYPE: Primary | ICD-10-CM

## 2024-11-19 DIAGNOSIS — Z23 IMMUNIZATION DUE: ICD-10-CM

## 2024-11-19 DIAGNOSIS — J96.11 CHRONIC RESPIRATORY FAILURE WITH HYPOXIA: ICD-10-CM

## 2024-11-19 DIAGNOSIS — Z72.0 TOBACCO ABUSE: ICD-10-CM

## 2024-11-19 DIAGNOSIS — F17.218 NICOTINE DEPENDENCE, CIGARETTES, WITH OTHER NICOTINE-INDUCED DISORDERS: ICD-10-CM

## 2024-11-19 DIAGNOSIS — J41.1 MUCOPURULENT CHRONIC BRONCHITIS: ICD-10-CM

## 2024-11-19 PROCEDURE — 99214 OFFICE O/P EST MOD 30 MIN: CPT | Performed by: NURSE PRACTITIONER

## 2024-11-19 PROCEDURE — 94010 BREATHING CAPACITY TEST: CPT | Performed by: NURSE PRACTITIONER

## 2024-11-19 PROCEDURE — G0008 ADMIN INFLUENZA VIRUS VAC: HCPCS | Performed by: NURSE PRACTITIONER

## 2024-11-19 PROCEDURE — 1160F RVW MEDS BY RX/DR IN RCRD: CPT | Performed by: NURSE PRACTITIONER

## 2024-11-19 PROCEDURE — 94729 DIFFUSING CAPACITY: CPT | Performed by: NURSE PRACTITIONER

## 2024-11-19 PROCEDURE — 3077F SYST BP >= 140 MM HG: CPT | Performed by: NURSE PRACTITIONER

## 2024-11-19 PROCEDURE — 3080F DIAST BP >= 90 MM HG: CPT | Performed by: NURSE PRACTITIONER

## 2024-11-19 PROCEDURE — 94726 PLETHYSMOGRAPHY LUNG VOLUMES: CPT | Performed by: NURSE PRACTITIONER

## 2024-11-19 PROCEDURE — 1159F MED LIST DOCD IN RCRD: CPT | Performed by: NURSE PRACTITIONER

## 2024-11-19 PROCEDURE — 90656 IIV3 VACC NO PRSV 0.5 ML IM: CPT | Performed by: NURSE PRACTITIONER

## 2024-11-19 RX ORDER — BISOPROLOL FUMARATE 5 MG/1
1 TABLET, FILM COATED ORAL DAILY
COMMUNITY
Start: 2024-10-01

## 2024-11-19 RX ORDER — RIVAROXABAN 15 MG/1
1 TABLET, FILM COATED ORAL DAILY
COMMUNITY
Start: 2024-10-31

## 2024-11-19 RX ORDER — FLUTICASONE PROPIONATE 50 MCG
SPRAY, SUSPENSION (ML) NASAL
COMMUNITY
Start: 2024-11-07

## 2024-11-19 RX ORDER — BISOPROLOL FUMARATE 10 MG/1
1 TABLET, FILM COATED ORAL DAILY
COMMUNITY
Start: 2024-11-07

## 2024-11-19 NOTE — PROGRESS NOTES
[facility] Pulmonary Follow up      Chief Complaint  COPD (F/u)    Subjective          Jovanna Ching presents to [facility]IT[facility]IC[facility] for routine follow-up on her COPD with chronic respiratory failure.  I last saw her here in the office in November 2022 after a hospitalization for a COPD exacerbation and acute on chronic respiratory failure, with frequent hospitalizations that year.  During her visit we discussed her respiratory failure, she was on oxygen and I recommended starting NIV or BiPAP at night.    She did start on NIV therapy and has not been in the hospital since then.  She says she feels much better she is breathing much better.  She does continue on her Anoro daily.  She  also continues to use her albuterol nebulizer usually about once a day, she is moving it much less than she had in years past.    She is still smoking but not as much.  She is only smoking outside a few cigarettes a day and does not smoke the whole cigarette.    She does continue on her oxygen at 2 L.      Objective     Vital Signs:   /90   Pulse 75   Temp 97.3 °F (36.3 °C)   Wt 99.8 kg (220 lb)   SpO2 97% Comment: 2L o2  BMI 33.45 kg/m²       Immunization History   Administered Date(s) Administered    Flu Vaccine Quad PF >36MO 11/16/2020    Fluzone  >6mos 11/19/2024    Fluzone (or Fluarix & Flulaval for VFC) >6mos 11/16/2020, 10/26/2021, 10/31/2022    Pneumococcal Polysaccharide (PPSV23) 10/26/2021    Tdap 10/26/2021       Physical Exam  Vitals reviewed.   Constitutional:       General: She is not in acute distress.     Appearance: She is well-developed.   HENT:      Head: Normocephalic and atraumatic.   Eyes:      Pupils: Pupils are equal, round, and reactive to light.   Cardiovascular:      Rate and Rhythm: Normal rate and regular rhythm.      Heart sounds: No murmur heard.  Pulmonary:      Effort: Pulmonary effort is normal. No respiratory distress.      Breath sounds: No wheezing or  rales.      Comments: Decreased but clear  Abdominal:      General: Bowel sounds are normal. There is no distension.      Palpations: Abdomen is soft.   Musculoskeletal:         General: Normal range of motion.      Cervical back: Normal range of motion and neck supple.   Skin:     General: Skin is warm and dry.      Findings: No erythema.   Neurological:      Mental Status: She is alert and oriented to person, place, and time.   Psychiatric:         Behavior: Behavior normal.          Result Review :            PFTs done in the office today:  Moderate obstructive lung disease with an FEV1 of 1.71, 68% predicted with air trapping.  Significantly improved from 2022 at 1.03, 40% predicted.    PA and lateral chest x-ray done the office today reviewed by me  Awaiting final MD interpretation                 Assessment and Plan    Problem List Items Addressed This Visit          Pulmonary and Pneumonias    Mucopurulent chronic bronchitis    Relevant Medications    fluticasone (FLONASE) 50 MCG/ACT nasal spray    Chronic respiratory failure with hypoxia (on 3 L nasal cannula)    COPD (chronic obstructive pulmonary disease) - Primary    Relevant Medications    fluticasone (FLONASE) 50 MCG/ACT nasal spray    Other Relevant Orders    XR Chest PA & Lateral       Tobacco    Tobacco abuse    Relevant Orders    CT chest low dose wo     Other Visit Diagnoses       Immunization due        Relevant Orders    Fluzone >6mos ([id number]) (Completed)    Nicotine dependence, cigarettes, with other nicotine-induced disorders        Relevant Orders    CT chest low dose wo            Mrs. Ching does have COPD with chronic respiratory failure.  She has done extremely well on NIV therapy.  She has remained out of the hospital last couple years, when prior to that she was in the hospital every few months.    Her overall shortness of breath has also improved as well as her PFTs.  She continues on her 2 L of oxygen with activity as needed.    She  does need new supplies with her DME, [employer] in [place].  Go ahead and send orders over to renew her supplies as well as her oxygen if needed.    Continue on her Symbicort and Spiriva, her primary care has been calling those in for her and she does not need refills today.    We did discuss annual low-dose screening CT scans for lung cancer with her history of tobacco use.  She would be agreeable to get that done but it likely be after the first of the year to have a ride to get the testing completed.    We did discuss the importance of smoking cessation today in the office.    Follow Up     No follow-ups on file.  Patient was given instructions and counseling regarding her condition or for health maintenance advice. Please see specific information pulled into the AVS if appropriate.     I spent 36 minutes caring for Jovanna on this date of service. This time includes time spent by me in the following activities:preparing for the visit, reviewing tests, obtaining and/or reviewing a separately obtained history, performing a medically appropriate examination and/or evaluation , counseling and educating the patient/family/caregiver, ordering medications, tests, or procedures, referring and communicating with other health care professionals , documenting information in the medical record, and independently interpreting results and communicating that information with the patient/family/caregiver    Excluding time spent on other separate services such as performing procedures or test interpretation, if applicable    GUILLERMO Coughlin, ACNP  [facility] Pulmonary Critical Care Medicine  Electronically signed

## 2024-11-20 DIAGNOSIS — J96.11 CHRONIC RESPIRATORY FAILURE WITH HYPOXIA: Primary | ICD-10-CM
